# Patient Record
Sex: MALE | Race: WHITE | NOT HISPANIC OR LATINO | Employment: OTHER | URBAN - METROPOLITAN AREA
[De-identification: names, ages, dates, MRNs, and addresses within clinical notes are randomized per-mention and may not be internally consistent; named-entity substitution may affect disease eponyms.]

---

## 2022-03-10 ENCOUNTER — OFFICE VISIT (OUTPATIENT)
Dept: INTERNAL MEDICINE CLINIC | Facility: CLINIC | Age: 72
End: 2022-03-10
Payer: COMMERCIAL

## 2022-03-10 VITALS
DIASTOLIC BLOOD PRESSURE: 87 MMHG | OXYGEN SATURATION: 98 % | SYSTOLIC BLOOD PRESSURE: 170 MMHG | WEIGHT: 176 LBS | BODY MASS INDEX: 25.2 KG/M2 | HEIGHT: 70 IN | HEART RATE: 79 BPM | TEMPERATURE: 98.9 F

## 2022-03-10 DIAGNOSIS — I10 PRIMARY HYPERTENSION: ICD-10-CM

## 2022-03-10 DIAGNOSIS — Z11.59 NEED FOR HEPATITIS C SCREENING TEST: ICD-10-CM

## 2022-03-10 DIAGNOSIS — Z12.5 ENCOUNTER FOR PROSTATE CANCER SCREENING: ICD-10-CM

## 2022-03-10 DIAGNOSIS — Z72.0 TOBACCO ABUSE: ICD-10-CM

## 2022-03-10 DIAGNOSIS — I99.9 VASCULAR DISEASE: ICD-10-CM

## 2022-03-10 DIAGNOSIS — Z76.89 ESTABLISHING CARE WITH NEW DOCTOR, ENCOUNTER FOR: Primary | ICD-10-CM

## 2022-03-10 DIAGNOSIS — K42.9 UMBILICAL HERNIA WITHOUT OBSTRUCTION AND WITHOUT GANGRENE: ICD-10-CM

## 2022-03-10 PROCEDURE — 3008F BODY MASS INDEX DOCD: CPT | Performed by: INTERNAL MEDICINE

## 2022-03-10 PROCEDURE — 1160F RVW MEDS BY RX/DR IN RCRD: CPT | Performed by: INTERNAL MEDICINE

## 2022-03-10 PROCEDURE — 99406 BEHAV CHNG SMOKING 3-10 MIN: CPT | Performed by: INTERNAL MEDICINE

## 2022-03-10 PROCEDURE — 99204 OFFICE O/P NEW MOD 45 MIN: CPT | Performed by: INTERNAL MEDICINE

## 2022-03-10 RX ORDER — AMLODIPINE BESYLATE 10 MG/1
10 TABLET ORAL DAILY
Qty: 90 TABLET | Refills: 0 | Status: SHIPPED | OUTPATIENT
Start: 2022-03-10 | End: 2022-06-30 | Stop reason: SDUPTHER

## 2022-03-10 RX ORDER — AMLODIPINE BESYLATE 10 MG/1
10 TABLET ORAL DAILY
COMMUNITY
Start: 2021-12-29 | End: 2022-03-10 | Stop reason: SDUPTHER

## 2022-03-10 RX ORDER — LISINOPRIL 20 MG/1
20 TABLET ORAL DAILY
Qty: 90 TABLET | Refills: 0 | Status: SHIPPED | OUTPATIENT
Start: 2022-03-10 | End: 2022-06-30

## 2022-03-10 RX ORDER — LISINOPRIL 20 MG/1
20 TABLET ORAL DAILY
COMMUNITY
Start: 2021-12-19 | End: 2022-03-10 | Stop reason: SDUPTHER

## 2022-03-10 NOTE — PATIENT INSTRUCTIONS
Hypertension   WHAT YOU NEED TO KNOW:   Hypertension is high blood pressure  Your blood pressure is the force of your blood moving against the walls of your arteries  Hypertension causes your blood pressure to get so high that your heart has to work much harder than normal  This can damage your heart  The cause of hypertension may not be known  This is called essential or primary hypertension  Hypertension caused by another medical condition, such as kidney disease, is called secondary hypertension  DISCHARGE INSTRUCTIONS:   Call your local emergency number (911 in the [address] [facility]) or have someone call if:   · You have chest pain  · You have any of the following signs of a heart attack:      ? Squeezing, pressure, or pain in your chest    ? You may  also have any of the following:     § Discomfort or pain in your back, neck, jaw, stomach, or arm    § Shortness of breath    § Nausea or vomiting    § Lightheadedness or a sudden cold sweat    · You become confused or have trouble speaking  · You suddenly feel lightheaded or have trouble breathing  Return to the emergency department if:   · You have a severe headache or vision loss  · You have weakness in an arm or leg  Call your doctor or cardiologist if:   · You feel faint, dizzy, confused, or drowsy  · You have been taking your blood pressure medicine but your pressure is higher than your provider says it should be  · You have questions or concerns about your condition or care  Medicines: You may  need any of the following:  · Antihypertensives  may be used to help lower your blood pressure  Several kinds of medicines are available  Your healthcare provider will choose medicines based on the kind of hypertension you have  You may need more than one type of medicine  Take the medicine exactly as directed  · Diuretics  help decrease extra fluid that collects in your body  This will help lower your blood pressure   You may urinate more often while you take this medicine  · Cholesterol medicine  helps lower your cholesterol level  A low cholesterol level helps prevent heart disease and makes it easier to control your blood pressure  · Take your medicine as directed  Contact your healthcare provider if you think your medicine is not helping or if you have side effects  Tell him or her if you are allergic to any medicine  Keep a list of the medicines, vitamins, and herbs you take  Include the amounts, and when and why you take them  Bring the list or the pill bottles to follow-up visits  Carry your medicine list with you in case of an emergency  Follow up with your doctor or cardiologist as directed: You will need to return to have your blood pressure checked and to have other lab tests done  Write down your questions so you remember to ask them during your visits  Stages of hypertension:       · Normal blood pressure is 119/79 or lower   Your healthcare provider may only check your blood pressure each year if it stays at a normal level  · Elevated blood pressure is 120/79 to 129/79   This is sometimes called prehypertension  Your healthcare provider may suggest lifestyle changes to help lower your blood pressure to a normal level  He or she may then check it again in 3 to 6 months  · Stage 1 hypertension is 130/80  to 139/89   Your provider may recommend lifestyle changes, medication, and checks every 3 to 6 months until your blood pressure is controlled  · Stage 2 hypertension is 140/90 or higher   Your provider will recommend lifestyle changes and have you take 2 kinds of hypertension medicines  You will also need to have your blood pressure checked monthly until it is controlled  Manage hypertension:   · Check your blood pressure at home  Avoid smoking, caffeine, and exercise at least 30 minutes before checking your blood pressure  Sit and rest for 5 minutes before you take your blood pressure   Extend your arm and support it on a flat surface  Your arm should be at the same level as your heart  Follow the directions that came with your blood pressure monitor  Check your blood pressure 2 times, 1 minute apart, before you take your medicine in the morning  Also check your blood pressure before your evening meal  Keep a record of your readings and bring it to your follow-up visits  Ask your healthcare provider what your blood pressure should be  · Manage any other health conditions you have  Health conditions such as diabetes can increase your risk for hypertension  Follow your healthcare provider's instructions and take all your medicines as directed  · Ask about all medicines  Certain medicines can increase your blood pressure  Examples include oral birth control pills, decongestants, herbal supplements, and NSAIDs, such as ibuprofen  Your healthcare provider can tell you which medicines are safe for you to take  This includes prescription and over-the-counter medicines  Lifestyle changes you can make to manage hypertension:  Your healthcare provider may recommend you work with a team to manage hypertension  The team may include medical experts such as a dietitian, an exercise or physical therapist, and a behavior therapist  Your family members may be included in helping you create lifestyle changes  · Limit sodium (salt) as directed  Too much sodium can affect your fluid balance  Check labels to find low-sodium or no-salt-added foods  Some low-sodium foods use potassium salts for flavor  Too much potassium can also cause health problems  Your healthcare provider will tell you how much sodium and potassium are safe for you to have in a day  He or she may recommend that you limit sodium to 2,300 mg a day  · Follow the meal plan recommended by your healthcare provider  A dietitian or your provider can give you more information on low-sodium plans or the DASH (Dietary Approaches to Stop Hypertension) eating plan   The DASH plan is low in sodium, processed sugar, unhealthy fats, and total fat  It is high in potassium, calcium, and fiber  These can be found in vegetables, fruit, and whole-grain foods  · Be physically active throughout the day  Physical activity, such as exercise, can help control your blood pressure and your weight  Be physically active for at least 30 minutes per day, on most days of the week  Include aerobic activity, such as walking or riding a bicycle  Also include strength training at least 2 times each week  Your healthcare providers can help you create a physical activity plan  · Decrease stress  This may help lower your blood pressure  Learn ways to relax, such as deep breathing or listening to music  · Limit alcohol as directed  Alcohol can increase your blood pressure  A drink of alcohol is 12 ounces of beer, 5 ounces of wine, or 1½ ounces of liquor  · Do not smoke  Nicotine and other chemicals in cigarettes and cigars can increase your blood pressure and also cause lung damage  Ask your healthcare provider for information if you currently smoke and need help to quit  E-cigarettes or smokeless tobacco still contain nicotine  Talk to your healthcare provider before you use these products  © Copyright [employer] 2022 Information is for End User's use only and may not be sold, redistributed or otherwise used for commercial purposes  All illustrations and images included in CareNotes® are the copyrighted property of [employer]  or [facility] Hermelindo Messer   The above information is an  only  It is not intended as medical advice for individual conditions or treatments  Talk to your doctor, nurse or pharmacist before following any medical regimen to see if it is safe and effective for you  DASH Eating Plan   WHAT YOU NEED TO KNOW:   The DASH (Dietary Approaches to Stop Hypertension) Eating Plan is designed to help prevent or lower high blood pressure   It can also help to lower LDL (bad) cholesterol and decrease your risk for heart disease  The plan is low in sodium, sugar, unhealthy fats, and total fat  It is high in potassium, calcium, magnesium, and fiber  These nutrients are added when you eat more fruits, vegetables, and whole grains  With the DASH eating plan, you need to eat a certain number of servings from each food group  This will help you get enough of certain nutrients and limit others  The amount of servings you should eat depends on how many calories you need  Your dietitian can       DISCHARGE INSTRUCTIONS:   What you need to know about sodium:  Your dietitian will tell you how much sodium is safe for you to have each day  People with high blood pressure should have no more than 1,500 to 2,300 mg of sodium in a day  A teaspoon (tsp) of salt has 2,300 mg of sodium  This may seem like a difficult goal, but small changes to the foods you eat can make a big difference  Your healthcare provider or dietitian can help you create a meal plan that follows your sodium limit  · Read food labels  Food labels can help you choose foods that are low in sodium  The amount of sodium is listed in milligrams (mg)  The % Daily Value (DV) column tells you how much of your daily needs are met by 1 serving of the food for each nutrient listed  Choose foods that have less than 5% of the DV of sodium  These foods are considered low in sodium  Foods that have 20% or more of the DV of sodium are considered high in sodium  Avoid foods that have more than 300 mg of sodium in each serving  Choose foods that say low-sodium, reduced-sodium, or no salt added on the food label  · Limit added salt  Do not salt food at the table if you add salt when you cook  Use herbs and spices, such as onions, garlic, and salt-free seasonings to add flavor  Try lemon or lime juice or vinegar to add a tart flavor  Use hot peppers or a small amount of hot pepper sauce to add a spicy flavor  Limit foods high in added salt, such as the following:    ? Seasonings made with salt, such as garlic salt, celery salt, onion salt, seasoned salt, meat tenderizers, and monosodium glutamate (MSG)    ? Miso soup and canned or dried soup mixes    ? Regular soy sauce, barbecue sauce, teriyaki sauce, steak sauce, Worcestershire sauce, and most flavored vinegars    ? Snack foods, such as salted chips, popcorn, pretzels, pork rinds, salted crackers, and salted nuts    ? Frozen foods, such as dinners, entrees, vegetables with sauces, and breaded meats    · Ask about salt substitutes  Ask your healthcare provider if you may use salt substitutes  Some salt substitutes have ingredients that can be harmful if you have certain health conditions  · Choose foods carefully at restaurants  Meals from restaurants, especially fast food restaurants, are often high in sodium  Some restaurants have nutrition information that tells you the amount of sodium in their foods  Ask to have your food prepared with less, or no salt  What you need to know about fats:  Healthy fats include unsaturated fats and omega-3 fatty acids  Unhealthy fats include saturated fats and trans fats  · Include healthy fats, such as the following:      ? Cooking oils, such as soybean, canola, olive, or sunflower    ? Fatty fish, such as salmon, tuna, mackerel, or sardines    ? Flaxseed oil or ground flaxseed    ? ½ cup of cooked beans, such as black beans, kidney beans, or jackson beans    ? 1½ ounces of low-sodium nuts, such as almonds or walnuts    ? Low-sugar, low-sodium peanut butter    ? Seeds such as mariam seeds or sunflower seeds       · Limit or do not have unhealthy fats, such as the following:      ? Foods that contain fat from animals, such as fatty meats, whole milk, butter, and cream    ? Shortening, stick margarine, palm oil, and coconut oil    ? Full-fat or creamy salad dressing    ? Creamy soup    ?  Crackers, chips, and baked goods made with margarine or shortening    ? Foods that are fried in unhealthy fats    ? Gravy and sauces, such as Alex or cheese sauces    What you need to know about carbohydrates (carbs): All carbs break down into sugar  Complex carbs contain more fiber than simple carbs  This means complex carbs go into the bloodstream more slowly and cause less of a blood sugar spike  Try to include more complex carbs and fewer simple carbs  · Include complex carbs, such as the following:      ? 1 slice of whole-grain bread    ? 1 ounce of dry cereal that does not contain added sugar    ? ½ cup of cooked oatmeal    ? 2 ounces of cooked whole-grain pasta    ? ½ cup of cooked brown rice    · Limit or do not have simple carbs, such as the following:      ? Baked goods, such as doughnuts, pastries, and cookies    ? Mixes for cornbread and biscuits    ? White rice and pasta mixes, such as boxed macaroni and cheese    ? Instant and cold cereals that contain sugar    ? Jelly, jam, and ice cream that contain sugar    ? Condiments such as ketchup    ? Drinks high in sugar, such as soft drinks, lemonade, and fruit juice    What you need to know about vegetables and fruits:  Vegetables and fruits can be fresh, frozen, or canned  If possible, try to choose low-sodium canned options  · Include a variety of vegetables and fruits, such as the following:      ? 1 medium apple, pear, or peach (about ½ cup chopped)    ? ½ small banana    ? ½ cup berries, such as blueberries, strawberries, or blackberries    ? 1 cup of raw leafy greens, such as lettuce, spinach, kale, or art greens    ? ½ cup of frozen or canned (no added salt) vegetables, such as green beans    ? ½ cup of fresh, frozen, or canned fruit (canned in light syrup or fruit juice)    ? ½ cup of vegetable or fruit juice    · Limit or do not have vegetables and fruits made in the following ways:      ? Frozen fruit such as cherries that have added sugar    ?  Fruit in cream or butter sauce    ? Canned vegetables that are high in sodium    ? Sauerkraut, pickled vegetables, and other foods prepared in brine    ? Fried vegetables or vegetables in butter or high-fat sauces    What you need to know about protein foods:   · Include lean or low-fat protein foods, such as the following:      ? Poultry (chicken, turkey) with no skin    ? Fish (especially fatty fish, such as salmon, fresh tuna, or mackerel)    ? Lean beef and pork (loin, round, extra lean hamburger)    ? Egg whites and egg substitutes    ? 1 cup of nonfat (skim) or 1% milk    ? 1½ ounces of fat-free or low-fat cheese    ? 6 ounces of nonfat or low-fat yogurt    · Limit or do not have high-fat protein foods, such as the following:      ? Smoked or cured meat, such as corned beef, lazaro, ham, hot dogs, and sausage    ? Canned beans and canned meats or spreads, such as potted meats, sardines, anchovies, and imitation seafood    ? Deli or lunch meats, such as bologna, ham, turkey, and roast beef    ? High-fat meat (T-bone steak, regular hamburger, and ribs)    ? Whole eggs and egg yolks    ? Whole milk, 2% milk, and cream    ? Regular cheese and processed cheese    Other guidelines to follow:   · Maintain a healthy weight  Your risk for heart disease is higher if you are overweight  Your healthcare provider may suggest that you lose weight if you are overweight  You can lose weight by eating fewer calories and foods that have added sugars and fat  The DASH meal plan can help you do this  Decrease calories by eating smaller portions at each meal and fewer snacks  Ask your healthcare provider for more information about how to lose weight  · Exercise regularly  Regular exercise can help you reach or maintain a healthy weight  Regular exercise can also help decrease your blood pressure and improve your cholesterol levels  Get 30 minutes or more of moderate exercise each day of the week  To lose weight, get at least 60 minutes of exercise  Talk to your healthcare provider about the best exercise program for you  · Limit alcohol  Women should limit alcohol to 1 drink a day  Men should limit alcohol to 2 drinks a day  A drink of alcohol is 12 ounces of beer, 5 ounces of wine, or 1½ ounces of liquor  For more information:   · [facility] [address]  [address] [address]  [place] [address]  Phone: [phone]  Web Address: [facility] no    © [address] [facility] 2022 Information is for End User's use only and may not be sold, redistributed or otherwise used for commercial purposes  All illustrations and images included in CareNotes® are the copyrighted property of [employer]  or [facility] Hermelindo Messer   The above information is an  only  It is not intended as medical advice for individual conditions or treatments  Talk to your doctor, nurse or pharmacist before following any medical regimen to see if it is safe and effective for you

## 2022-03-10 NOTE — PROGRESS NOTES
Assessment/Plan:    Diagnoses and all orders for this visit:    Establishing care with new doctor, encounter for  Comments:  Moved from [facility] in 2019 and has not been able to establish care due to pandemic,reports that he gets refills from his physician son in [place]  Primary hypertension  -     CBC and differential; Future  -     Comprehensive metabolic panel; Future  -     Lipid panel; Future  -     amLODIPine (NORVASC) 10 mg tablet; Take 1 tablet (10 mg total) by mouth daily  -     lisinopril (ZESTRIL) 20 mg tablet; Take 1 tablet (20 mg total) by mouth daily    Need for hepatitis C screening test  -     Hepatitis C Antibody ([facility]); Future    Encounter for prostate cancer screening  -     PSA, Total Screen; Future    Tobacco abuse  Comments:  Current smoker, for 50 years, counseled for smoking cessation     Umbilical hernia without obstruction and without gangrene  Comments:  Ongoing for several years, denies any discomfort, declines surgical referral, advised to seek immediate medical opinion if he has abdominal pain with NVD    Vascular disease  Comments:  H/o R femoro popliteal bypass in 2017, was on plavix and aspirin which he discontinued due to frequent nose bleeds  Will recommend vascular surgery referral    Other orders  -     Discontinue: amLODIPine (NORVASC) 10 mg tablet; Take 10 mg by mouth daily  -     Discontinue: lisinopril (ZESTRIL) 20 mg tablet; Take 20 mg by mouth daily         BMI Counseling: Body mass index is 25 25 kg/m²  The BMI is above normal  Nutrition recommendations include decreasing portion sizes, encouraging healthy choices of fruits and vegetables, decreasing fast food intake, consuming healthier snacks, limiting drinks that contain sugar, moderation in carbohydrate intake, increasing intake of lean protein, reducing intake of saturated and trans fat and reducing intake of cholesterol  Exercise recommendations include moderate physical activity 150 minutes/week   No pharmacotherapy was ordered  Rationale for BMI follow-up plan is due to patient being overweight or obese  Patient Instructions     Hypertension   WHAT YOU NEED TO KNOW:   Hypertension is high blood pressure  Your blood pressure is the force of your blood moving against the walls of your arteries  Hypertension causes your blood pressure to get so high that your heart has to work much harder than normal  This can damage your heart  The cause of hypertension may not be known  This is called essential or primary hypertension  Hypertension caused by another medical condition, such as kidney disease, is called secondary hypertension  DISCHARGE INSTRUCTIONS:   Call your local emergency number (911 in the [address] [facility]) or have someone call if:   · You have chest pain  · You have any of the following signs of a heart attack:      ? Squeezing, pressure, or pain in your chest    ? You may  also have any of the following:     § Discomfort or pain in your back, neck, jaw, stomach, or arm    § Shortness of breath    § Nausea or vomiting    § Lightheadedness or a sudden cold sweat    · You become confused or have trouble speaking  · You suddenly feel lightheaded or have trouble breathing  Return to the emergency department if:   · You have a severe headache or vision loss  · You have weakness in an arm or leg  Call your doctor or cardiologist if:   · You feel faint, dizzy, confused, or drowsy  · You have been taking your blood pressure medicine but your pressure is higher than your provider says it should be  · You have questions or concerns about your condition or care  Medicines: You may  need any of the following:  · Antihypertensives  may be used to help lower your blood pressure  Several kinds of medicines are available  Your healthcare provider will choose medicines based on the kind of hypertension you have  You may need more than one type of medicine   Take the medicine exactly as directed  · Diuretics  help decrease extra fluid that collects in your body  This will help lower your blood pressure  You may urinate more often while you take this medicine  · Cholesterol medicine  helps lower your cholesterol level  A low cholesterol level helps prevent heart disease and makes it easier to control your blood pressure  · Take your medicine as directed  Contact your healthcare provider if you think your medicine is not helping or if you have side effects  Tell him or her if you are allergic to any medicine  Keep a list of the medicines, vitamins, and herbs you take  Include the amounts, and when and why you take them  Bring the list or the pill bottles to follow-up visits  Carry your medicine list with you in case of an emergency  Follow up with your doctor or cardiologist as directed: You will need to return to have your blood pressure checked and to have other lab tests done  Write down your questions so you remember to ask them during your visits  Stages of hypertension:       · Normal blood pressure is 119/79 or lower   Your healthcare provider may only check your blood pressure each year if it stays at a normal level  · Elevated blood pressure is 120/79 to 129/79   This is sometimes called prehypertension  Your healthcare provider may suggest lifestyle changes to help lower your blood pressure to a normal level  He or she may then check it again in 3 to 6 months  · Stage 1 hypertension is 130/80  to 139/89   Your provider may recommend lifestyle changes, medication, and checks every 3 to 6 months until your blood pressure is controlled  · Stage 2 hypertension is 140/90 or higher   Your provider will recommend lifestyle changes and have you take 2 kinds of hypertension medicines  You will also need to have your blood pressure checked monthly until it is controlled  Manage hypertension:   · Check your blood pressure at home    Avoid smoking, caffeine, and exercise at least 30 minutes before checking your blood pressure  Sit and rest for 5 minutes before you take your blood pressure  Extend your arm and support it on a flat surface  Your arm should be at the same level as your heart  Follow the directions that came with your blood pressure monitor  Check your blood pressure 2 times, 1 minute apart, before you take your medicine in the morning  Also check your blood pressure before your evening meal  Keep a record of your readings and bring it to your follow-up visits  Ask your healthcare provider what your blood pressure should be  · Manage any other health conditions you have  Health conditions such as diabetes can increase your risk for hypertension  Follow your healthcare provider's instructions and take all your medicines as directed  · Ask about all medicines  Certain medicines can increase your blood pressure  Examples include oral birth control pills, decongestants, herbal supplements, and NSAIDs, such as ibuprofen  Your healthcare provider can tell you which medicines are safe for you to take  This includes prescription and over-the-counter medicines  Lifestyle changes you can make to manage hypertension:  Your healthcare provider may recommend you work with a team to manage hypertension  The team may include medical experts such as a dietitian, an exercise or physical therapist, and a behavior therapist  Your family members may be included in helping you create lifestyle changes  · Limit sodium (salt) as directed  Too much sodium can affect your fluid balance  Check labels to find low-sodium or no-salt-added foods  Some low-sodium foods use potassium salts for flavor  Too much potassium can also cause health problems  Your healthcare provider will tell you how much sodium and potassium are safe for you to have in a day  He or she may recommend that you limit sodium to 2,300 mg a day           · Follow the meal plan recommended by your healthcare provider  A dietitian or your provider can give you more information on low-sodium plans or the DASH (Dietary Approaches to Stop Hypertension) eating plan  The DASH plan is low in sodium, processed sugar, unhealthy fats, and total fat  It is high in potassium, calcium, and fiber  These can be found in vegetables, fruit, and whole-grain foods  · Be physically active throughout the day  Physical activity, such as exercise, can help control your blood pressure and your weight  Be physically active for at least 30 minutes per day, on most days of the week  Include aerobic activity, such as walking or riding a bicycle  Also include strength training at least 2 times each week  Your healthcare providers can help you create a physical activity plan  · Decrease stress  This may help lower your blood pressure  Learn ways to relax, such as deep breathing or listening to music  · Limit alcohol as directed  Alcohol can increase your blood pressure  A drink of alcohol is 12 ounces of beer, 5 ounces of wine, or 1½ ounces of liquor  · Do not smoke  Nicotine and other chemicals in cigarettes and cigars can increase your blood pressure and also cause lung damage  Ask your healthcare provider for information if you currently smoke and need help to quit  E-cigarettes or smokeless tobacco still contain nicotine  Talk to your healthcare provider before you use these products  © Copyright [employer] 2022 Information is for End User's use only and may not be sold, redistributed or otherwise used for commercial purposes  All illustrations and images included in CareNotes® are the copyrighted property of [employer] M [employer] Inc  or [facility] [employer]   The above information is an  only  It is not intended as medical advice for individual conditions or treatments  Talk to your doctor, nurse or pharmacist before following any medical regimen to see if it is safe and effective for you    DASH Eating Plan   WHAT YOU NEED TO KNOW:   The DASH (Dietary Approaches to Stop Hypertension) Eating Plan is designed to help prevent or lower high blood pressure  It can also help to lower LDL (bad) cholesterol and decrease your risk for heart disease  The plan is low in sodium, sugar, unhealthy fats, and total fat  It is high in potassium, calcium, magnesium, and fiber  These nutrients are added when you eat more fruits, vegetables, and whole grains  With the DASH eating plan, you need to eat a certain number of servings from each food group  This will help you get enough of certain nutrients and limit others  The amount of servings you should eat depends on how many calories you need  Your dietitian can       DISCHARGE INSTRUCTIONS:   What you need to know about sodium:  Your dietitian will tell you how much sodium is safe for you to have each day  People with high blood pressure should have no more than 1,500 to 2,300 mg of sodium in a day  A teaspoon (tsp) of salt has 2,300 mg of sodium  This may seem like a difficult goal, but small changes to the foods you eat can make a big difference  Your healthcare provider or dietitian can help you create a meal plan that follows your sodium limit  · Read food labels  Food labels can help you choose foods that are low in sodium  The amount of sodium is listed in milligrams (mg)  The % Daily Value (DV) column tells you how much of your daily needs are met by 1 serving of the food for each nutrient listed  Choose foods that have less than 5% of the DV of sodium  These foods are considered low in sodium  Foods that have 20% or more of the DV of sodium are considered high in sodium  Avoid foods that have more than 300 mg of sodium in each serving  Choose foods that say low-sodium, reduced-sodium, or no salt added on the food label  · Limit added salt  Do not salt food at the table if you add salt when you cook   Use herbs and spices, such as onions, garlic, and salt-free seasonings to add flavor  Try lemon or lime juice or vinegar to add a tart flavor  Use hot peppers or a small amount of hot pepper sauce to add a spicy flavor  Limit foods high in added salt, such as the following:    ? Seasonings made with salt, such as garlic salt, celery salt, onion salt, seasoned salt, meat tenderizers, and monosodium glutamate (MSG)    ? Miso soup and canned or dried soup mixes    ? Regular soy sauce, barbecue sauce, teriyaki sauce, steak sauce, Worcestershire sauce, and most flavored vinegars    ? Snack foods, such as salted chips, popcorn, pretzels, pork rinds, salted crackers, and salted nuts    ? Frozen foods, such as dinners, entrees, vegetables with sauces, and breaded meats    · Ask about salt substitutes  Ask your healthcare provider if you may use salt substitutes  Some salt substitutes have ingredients that can be harmful if you have certain health conditions  · Choose foods carefully at restaurants  Meals from restaurants, especially fast food restaurants, are often high in sodium  Some restaurants have nutrition information that tells you the amount of sodium in their foods  Ask to have your food prepared with less, or no salt  What you need to know about fats:  Healthy fats include unsaturated fats and omega-3 fatty acids  Unhealthy fats include saturated fats and trans fats  · Include healthy fats, such as the following:      ? Cooking oils, such as soybean, canola, olive, or sunflower    ? Fatty fish, such as salmon, tuna, mackerel, or sardines    ? Flaxseed oil or ground flaxseed    ? ½ cup of cooked beans, such as black beans, kidney beans, or jackson beans    ? 1½ ounces of low-sodium nuts, such as almonds or walnuts    ? Low-sugar, low-sodium peanut butter    ? Seeds such as mariam seeds or sunflower seeds       · Limit or do not have unhealthy fats, such as the following:      ?  Foods that contain fat from animals, such as fatty meats, whole milk, butter, and cream    ? Shortening, stick margarine, palm oil, and coconut oil    ? Full-fat or creamy salad dressing    ? Creamy soup    ? Crackers, chips, and baked goods made with margarine or shortening    ? Foods that are fried in unhealthy fats    ? Gravy and sauces, such as Alex or cheese sauces    What you need to know about carbohydrates (carbs): All carbs break down into sugar  Complex carbs contain more fiber than simple carbs  This means complex carbs go into the bloodstream more slowly and cause less of a blood sugar spike  Try to include more complex carbs and fewer simple carbs  · Include complex carbs, such as the following:      ? 1 slice of whole-grain bread    ? 1 ounce of dry cereal that does not contain added sugar    ? ½ cup of cooked oatmeal    ? 2 ounces of cooked whole-grain pasta    ? ½ cup of cooked brown rice    · Limit or do not have simple carbs, such as the following:      ? Baked goods, such as doughnuts, pastries, and cookies    ? Mixes for cornbread and biscuits    ? White rice and pasta mixes, such as boxed macaroni and cheese    ? Instant and cold cereals that contain sugar    ? Jelly, jam, and ice cream that contain sugar    ? Condiments such as ketchup    ? Drinks high in sugar, such as soft drinks, lemonade, and fruit juice    What you need to know about vegetables and fruits:  Vegetables and fruits can be fresh, frozen, or canned  If possible, try to choose low-sodium canned options  · Include a variety of vegetables and fruits, such as the following:      ? 1 medium apple, pear, or peach (about ½ cup chopped)    ? ½ small banana    ? ½ cup berries, such as blueberries, strawberries, or blackberries    ? 1 cup of raw leafy greens, such as lettuce, spinach, kale, or art greens    ? ½ cup of frozen or canned (no added salt) vegetables, such as green beans    ? ½ cup of fresh, frozen, or canned fruit (canned in light syrup or fruit juice)    ?  ½ cup of vegetable or fruit juice    · Limit or do not have vegetables and fruits made in the following ways:      ? Frozen fruit such as cherries that have added sugar    ? Fruit in cream or butter sauce    ? Canned vegetables that are high in sodium    ? Sauerkraut, pickled vegetables, and other foods prepared in brine    ? Fried vegetables or vegetables in butter or high-fat sauces    What you need to know about protein foods:   · Include lean or low-fat protein foods, such as the following:      ? Poultry (chicken, turkey) with no skin    ? Fish (especially fatty fish, such as salmon, fresh tuna, or mackerel)    ? Lean beef and pork (loin, round, extra lean hamburger)    ? Egg whites and egg substitutes    ? 1 cup of nonfat (skim) or 1% milk    ? 1½ ounces of fat-free or low-fat cheese    ? 6 ounces of nonfat or low-fat yogurt    · Limit or do not have high-fat protein foods, such as the following:      ? Smoked or cured meat, such as corned beef, lazaro, ham, hot dogs, and sausage    ? Canned beans and canned meats or spreads, such as potted meats, sardines, anchovies, and imitation seafood    ? Deli or lunch meats, such as bologna, ham, turkey, and roast beef    ? High-fat meat (T-bone steak, regular hamburger, and ribs)    ? Whole eggs and egg yolks    ? Whole milk, 2% milk, and cream    ? Regular cheese and processed cheese    Other guidelines to follow:   · Maintain a healthy weight  Your risk for heart disease is higher if you are overweight  Your healthcare provider may suggest that you lose weight if you are overweight  You can lose weight by eating fewer calories and foods that have added sugars and fat  The DASH meal plan can help you do this  Decrease calories by eating smaller portions at each meal and fewer snacks  Ask your healthcare provider for more information about how to lose weight  · Exercise regularly  Regular exercise can help you reach or maintain a healthy weight   Regular exercise can also help decrease your blood pressure and improve your cholesterol levels  Get 30 minutes or more of moderate exercise each day of the week  To lose weight, get at least 60 minutes of exercise  Talk to your healthcare provider about the best exercise program for you  · Limit alcohol  Women should limit alcohol to 1 drink a day  Men should limit alcohol to 2 drinks a day  A drink of alcohol is 12 ounces of beer, 5 ounces of wine, or 1½ ounces of liquor  For more information:   · [facility] and [address]  [address] [address]  [place] [address]  Phone: 0- 154 - [phone]  Web Address: [facility] no    © [facility] 2022 Information is for End User's use only and may not be sold, redistributed or otherwise used for commercial purposes  All illustrations and images included in CareNotes® are the copyrighted property of [employer] A M [employer] Inc  or [employer]   The above information is an  only  It is not intended as medical advice for individual conditions or treatments  Talk to your doctor, nurse or pharmacist before following any medical regimen to see if it is safe and effective for you  Subjective:      Patient ID: Elizabeth De La Cruz is a 66 y o  male    Pt comes to establish care after moving from [place] in 2019 and was not able to establish care due to pandemic  Has has h/o hypertension and PVD and had several ER visits last year at [facility] for hypertensive urgency with right sided paraesthesia, blood work and [facility] and CTA neck, renal US  being unremarkable  Started on lisinopril 20 mg in addition to his amlodipine 10 mg  Denies CAD, DM  Denies CP, SOB, palpitations, currently           Current Outpatient Medications:     amLODIPine (NORVASC) 10 mg tablet, Take 1 tablet (10 mg total) by mouth daily, Disp: 90 tablet, Rfl: 0    lisinopril (ZESTRIL) 20 mg tablet, Take 1 tablet (20 mg total) by mouth daily, Disp: 90 tablet, Rfl: 0    No results found for this or any previous visit (from the past 1008 hour(s))  The following portions of the patient's history were reviewed and updated as appropriate: allergies, current medications, past family history, past medical history, past social history, past surgical history and problem list      Review of Systems   Constitutional: Negative for activity change, appetite change, chills, diaphoresis, fatigue, fever and unexpected weight change  HENT: Negative for congestion and sore throat  Respiratory: Negative for apnea, cough, choking, chest tightness, shortness of breath, wheezing and stridor  Cardiovascular: Negative for chest pain, palpitations and leg swelling  Gastrointestinal: Negative for abdominal distention, abdominal pain, blood in stool, constipation, nausea and rectal pain  Genitourinary: Negative for dysuria, flank pain, frequency and urgency  Musculoskeletal: Negative for arthralgias, back pain, gait problem, joint swelling and myalgias  Skin: Negative for color change, pallor and rash  Neurological: Negative for dizziness, tremors, syncope, weakness, light-headedness, numbness and headaches  Objective:      Vitals:    03/10/22 1622   BP: 170/87   Pulse: 79   Temp: 98 9 °F (37 2 °C)   SpO2: 98%          Physical Exam  Vitals reviewed  Constitutional:       General: He is not in acute distress  Appearance: Normal appearance  He is not ill-appearing, toxic-appearing or diaphoretic  HENT:      Mouth/Throat:      Mouth: Mucous membranes are moist    Cardiovascular:      Rate and Rhythm: Normal rate and regular rhythm  Pulses: Normal pulses  Heart sounds: Normal heart sounds  No murmur heard  No friction rub  No gallop  Pulmonary:      Effort: Pulmonary effort is normal  No respiratory distress  Breath sounds: Normal breath sounds  No stridor  No wheezing, rhonchi or rales  Chest:      Chest wall: No tenderness     Abdominal: General: There is no distension  Palpations: Abdomen is soft  There is no mass  Tenderness: There is no abdominal tenderness  There is no rebound  Musculoskeletal:         General: No swelling or tenderness  Right lower leg: Edema present  Left lower leg: No edema  Skin:     General: Skin is warm and dry  Findings: No lesion or rash  Neurological:      Mental Status: He is alert and oriented to person, place, and time

## 2022-03-11 PROBLEM — I16.9 HYPERTENSIVE CRISIS, UNSPECIFIED: Status: ACTIVE | Noted: 2021-03-24

## 2022-03-11 PROBLEM — I99.9 VASCULAR DISEASE: Status: ACTIVE | Noted: 2021-03-24

## 2022-03-11 PROBLEM — Z72.0 TOBACCO ABUSE: Status: ACTIVE | Noted: 2022-03-11

## 2022-03-11 PROBLEM — I10 PRIMARY HYPERTENSION: Status: ACTIVE | Noted: 2022-03-11

## 2022-03-11 PROBLEM — K42.9 UMBILICAL HERNIA WITHOUT OBSTRUCTION AND WITHOUT GANGRENE: Status: ACTIVE | Noted: 2022-03-11

## 2022-03-14 ENCOUNTER — LAB (OUTPATIENT)
Dept: LAB | Age: 72
End: 2022-03-14
Payer: COMMERCIAL

## 2022-03-14 DIAGNOSIS — Z11.59 NEED FOR HEPATITIS C SCREENING TEST: ICD-10-CM

## 2022-03-14 DIAGNOSIS — I10 PRIMARY HYPERTENSION: ICD-10-CM

## 2022-03-14 DIAGNOSIS — Z12.5 ENCOUNTER FOR PROSTATE CANCER SCREENING: ICD-10-CM

## 2022-03-14 LAB
ALBUMIN SERPL BCP-MCNC: 3.8 G/DL (ref 3.5–5)
ALP SERPL-CCNC: 101 U/L (ref 46–116)
ALT SERPL W P-5'-P-CCNC: 23 U/L (ref 12–78)
ANION GAP SERPL CALCULATED.3IONS-SCNC: 7 MMOL/L (ref 4–13)
AST SERPL W P-5'-P-CCNC: 20 U/L (ref 5–45)
BASOPHILS # BLD AUTO: 0.09 THOUSANDS/ΜL (ref 0–0.1)
BASOPHILS NFR BLD AUTO: 1 % (ref 0–1)
BILIRUB SERPL-MCNC: 0.42 MG/DL (ref 0.2–1)
BUN SERPL-MCNC: 22 MG/DL (ref 5–25)
CALCIUM SERPL-MCNC: 9.4 MG/DL (ref 8.3–10.1)
CHLORIDE SERPL-SCNC: 109 MMOL/L (ref 100–108)
CHOLEST SERPL-MCNC: 184 MG/DL
CO2 SERPL-SCNC: 28 MMOL/L (ref 21–32)
CREAT SERPL-MCNC: 0.98 MG/DL (ref 0.6–1.3)
EOSINOPHIL # BLD AUTO: 0.36 THOUSAND/ΜL (ref 0–0.61)
EOSINOPHIL NFR BLD AUTO: 4 % (ref 0–6)
ERYTHROCYTE [DISTWIDTH] IN BLOOD BY AUTOMATED COUNT: 15 % (ref 11.6–15.1)
GFR SERPL CREATININE-BSD FRML MDRD: 76 ML/MIN/1.73SQ M
GLUCOSE P FAST SERPL-MCNC: 101 MG/DL (ref 65–99)
HCT VFR BLD AUTO: 47.7 % (ref 36.5–49.3)
HCV AB SER QL: NORMAL
HDLC SERPL-MCNC: 41 MG/DL
HGB BLD-MCNC: 15.5 G/DL (ref 12–17)
IMM GRANULOCYTES # BLD AUTO: 0.03 THOUSAND/UL (ref 0–0.2)
IMM GRANULOCYTES NFR BLD AUTO: 0 % (ref 0–2)
LDLC SERPL CALC-MCNC: 117 MG/DL (ref 0–100)
LYMPHOCYTES # BLD AUTO: 2.71 THOUSANDS/ΜL (ref 0.6–4.47)
LYMPHOCYTES NFR BLD AUTO: 28 % (ref 14–44)
MCH RBC QN AUTO: 29.1 PG (ref 26.8–34.3)
MCHC RBC AUTO-ENTMCNC: 32.5 G/DL (ref 31.4–37.4)
MCV RBC AUTO: 90 FL (ref 82–98)
MONOCYTES # BLD AUTO: 1.14 THOUSAND/ΜL (ref 0.17–1.22)
MONOCYTES NFR BLD AUTO: 12 % (ref 4–12)
NEUTROPHILS # BLD AUTO: 5.3 THOUSANDS/ΜL (ref 1.85–7.62)
NEUTS SEG NFR BLD AUTO: 55 % (ref 43–75)
NONHDLC SERPL-MCNC: 143 MG/DL
NRBC BLD AUTO-RTO: 0 /100 WBCS
PLATELET # BLD AUTO: 234 THOUSANDS/UL (ref 149–390)
PMV BLD AUTO: 11.2 FL (ref 8.9–12.7)
POTASSIUM SERPL-SCNC: 3.9 MMOL/L (ref 3.5–5.3)
PROT SERPL-MCNC: 7.5 G/DL (ref 6.4–8.2)
PSA SERPL-MCNC: 3.1 NG/ML (ref 0–4)
RBC # BLD AUTO: 5.33 MILLION/UL (ref 3.88–5.62)
SODIUM SERPL-SCNC: 144 MMOL/L (ref 136–145)
TRIGL SERPL-MCNC: 132 MG/DL
WBC # BLD AUTO: 9.63 THOUSAND/UL (ref 4.31–10.16)

## 2022-03-14 PROCEDURE — 36415 COLL VENOUS BLD VENIPUNCTURE: CPT

## 2022-03-14 PROCEDURE — G0103 PSA SCREENING: HCPCS

## 2022-03-14 PROCEDURE — 80053 COMPREHEN METABOLIC PANEL: CPT

## 2022-03-14 PROCEDURE — 86803 HEPATITIS C AB TEST: CPT

## 2022-03-14 PROCEDURE — 80061 LIPID PANEL: CPT

## 2022-03-14 PROCEDURE — 85025 COMPLETE CBC W/AUTO DIFF WBC: CPT

## 2022-03-15 NOTE — RESULT ENCOUNTER NOTE
His cholesterol was elevated but otherwise blood work was good, he has a history of peripheral vascular disease and needs to be on a cholesterol-lowering medications  Will discuss with him in the next office visit about this in a month's time

## 2022-03-16 ENCOUNTER — TELEPHONE (OUTPATIENT)
Dept: INTERNAL MEDICINE CLINIC | Facility: CLINIC | Age: 72
End: 2022-03-16

## 2022-04-02 ENCOUNTER — HOSPITAL ENCOUNTER (EMERGENCY)
Facility: HOSPITAL | Age: 72
Discharge: HOME | End: 2022-04-03
Attending: EMERGENCY MEDICINE
Payer: COMMERCIAL

## 2022-04-02 ENCOUNTER — APPOINTMENT (EMERGENCY)
Dept: CT IMAGING | Facility: HOSPITAL | Age: 72
End: 2022-04-02
Payer: COMMERCIAL

## 2022-04-02 VITALS
HEIGHT: 70 IN | HEART RATE: 78 BPM | SYSTOLIC BLOOD PRESSURE: 188 MMHG | BODY MASS INDEX: 24.77 KG/M2 | DIASTOLIC BLOOD PRESSURE: 91 MMHG | RESPIRATION RATE: 18 BRPM | OXYGEN SATURATION: 96 % | WEIGHT: 173 LBS | TEMPERATURE: 97.8 F

## 2022-04-02 DIAGNOSIS — I16.0 HYPERTENSIVE URGENCY: Primary | ICD-10-CM

## 2022-04-02 LAB
2HR DELTA HS TROPONIN: -2 NG/L
ALBUMIN SERPL BCP-MCNC: 3.9 G/DL (ref 3.5–5)
ALP SERPL-CCNC: 108 U/L (ref 46–116)
ALT SERPL W P-5'-P-CCNC: 26 U/L (ref 12–78)
ANION GAP SERPL CALCULATED.3IONS-SCNC: 8 MMOL/L (ref 4–13)
AST SERPL W P-5'-P-CCNC: 17 U/L (ref 5–45)
BASOPHILS # BLD AUTO: 0.06 THOUSANDS/ΜL (ref 0–0.1)
BASOPHILS NFR BLD AUTO: 1 % (ref 0–1)
BILIRUB SERPL-MCNC: 0.25 MG/DL (ref 0.2–1)
BUN SERPL-MCNC: 21 MG/DL (ref 5–25)
CALCIUM SERPL-MCNC: 9 MG/DL (ref 8.3–10.1)
CARDIAC TROPONIN I PNL SERPL HS: 19 NG/L
CARDIAC TROPONIN I PNL SERPL HS: 21 NG/L
CHLORIDE SERPL-SCNC: 102 MMOL/L (ref 100–108)
CO2 SERPL-SCNC: 30 MMOL/L (ref 21–32)
CREAT SERPL-MCNC: 1.4 MG/DL (ref 0.6–1.3)
EOSINOPHIL # BLD AUTO: 0.29 THOUSAND/ΜL (ref 0–0.61)
EOSINOPHIL NFR BLD AUTO: 3 % (ref 0–6)
ERYTHROCYTE [DISTWIDTH] IN BLOOD BY AUTOMATED COUNT: 14.8 % (ref 11.6–15.1)
GFR SERPL CREATININE-BSD FRML MDRD: 49 ML/MIN/1.73SQ M
GLUCOSE SERPL-MCNC: 125 MG/DL (ref 65–140)
HCT VFR BLD AUTO: 51.1 % (ref 36.5–49.3)
HGB BLD-MCNC: 16.8 G/DL (ref 12–17)
IMM GRANULOCYTES # BLD AUTO: 0.04 THOUSAND/UL (ref 0–0.2)
IMM GRANULOCYTES NFR BLD AUTO: 0 % (ref 0–2)
LYMPHOCYTES # BLD AUTO: 2.21 THOUSANDS/ΜL (ref 0.6–4.47)
LYMPHOCYTES NFR BLD AUTO: 23 % (ref 14–44)
MCH RBC QN AUTO: 29.7 PG (ref 26.8–34.3)
MCHC RBC AUTO-ENTMCNC: 32.9 G/DL (ref 31.4–37.4)
MCV RBC AUTO: 90 FL (ref 82–98)
MONOCYTES # BLD AUTO: 0.93 THOUSAND/ΜL (ref 0.17–1.22)
MONOCYTES NFR BLD AUTO: 10 % (ref 4–12)
NEUTROPHILS # BLD AUTO: 6.27 THOUSANDS/ΜL (ref 1.85–7.62)
NEUTS SEG NFR BLD AUTO: 63 % (ref 43–75)
NRBC BLD AUTO-RTO: 0 /100 WBCS
PLATELET # BLD AUTO: 248 THOUSANDS/UL (ref 149–390)
PMV BLD AUTO: 10.7 FL (ref 8.9–12.7)
POTASSIUM SERPL-SCNC: 4.5 MMOL/L (ref 3.5–5.3)
PROT SERPL-MCNC: 8.2 G/DL (ref 6.4–8.2)
RBC # BLD AUTO: 5.65 MILLION/UL (ref 3.88–5.62)
SODIUM SERPL-SCNC: 140 MMOL/L (ref 136–145)
WBC # BLD AUTO: 9.8 THOUSAND/UL (ref 4.31–10.16)

## 2022-04-02 PROCEDURE — 36415 COLL VENOUS BLD VENIPUNCTURE: CPT | Performed by: EMERGENCY MEDICINE

## 2022-04-02 PROCEDURE — 84484 ASSAY OF TROPONIN QUANT: CPT | Performed by: EMERGENCY MEDICINE

## 2022-04-02 PROCEDURE — 99284 EMERGENCY DEPT VISIT MOD MDM: CPT

## 2022-04-02 PROCEDURE — 70450 CT HEAD/BRAIN W/O DYE: CPT

## 2022-04-02 PROCEDURE — 85025 COMPLETE CBC W/AUTO DIFF WBC: CPT | Performed by: EMERGENCY MEDICINE

## 2022-04-02 PROCEDURE — 93005 ELECTROCARDIOGRAM TRACING: CPT

## 2022-04-02 PROCEDURE — G1004 CDSM NDSC: HCPCS

## 2022-04-02 PROCEDURE — 96374 THER/PROPH/DIAG INJ IV PUSH: CPT

## 2022-04-02 PROCEDURE — 80053 COMPREHEN METABOLIC PANEL: CPT | Performed by: EMERGENCY MEDICINE

## 2022-04-02 PROCEDURE — 99284 EMERGENCY DEPT VISIT MOD MDM: CPT | Performed by: EMERGENCY MEDICINE

## 2022-04-02 RX ORDER — LABETALOL 20 MG/4 ML (5 MG/ML) INTRAVENOUS SYRINGE
10 ONCE
Status: COMPLETED | OUTPATIENT
Start: 2022-04-02 | End: 2022-04-02

## 2022-04-02 RX ADMIN — LABETALOL HYDROCHLORIDE 10 MG: 5 INJECTION, SOLUTION INTRAVENOUS at 21:10

## 2022-04-03 LAB
ATRIAL RATE: 75 BPM
ATRIAL RATE: 75 BPM
P AXIS: 68 DEGREES
P AXIS: 76 DEGREES
PR INTERVAL: 204 MS
PR INTERVAL: 208 MS
QRS AXIS: 52 DEGREES
QRS AXIS: 58 DEGREES
QRSD INTERVAL: 64 MS
QRSD INTERVAL: 76 MS
QT INTERVAL: 360 MS
QT INTERVAL: 362 MS
QTC INTERVAL: 402 MS
QTC INTERVAL: 404 MS
T WAVE AXIS: 92 DEGREES
T WAVE AXIS: 97 DEGREES
VENTRICULAR RATE: 75 BPM
VENTRICULAR RATE: 75 BPM

## 2022-04-03 PROCEDURE — 93010 ELECTROCARDIOGRAM REPORT: CPT | Performed by: INTERNAL MEDICINE

## 2022-04-03 NOTE — ED PROVIDER NOTES
History  Chief Complaint   Patient presents with    Hypertension     Pt c/o HTN x2 days, denies symptoms  Also c/o right leg numbness radiating to buttock, hx of same, hx right artery graft       History provided by:  Patient  Hypertension  Severity:  Severe  Onset quality:  Gradual  Duration:  1 week  Timing:  Constant  Progression:  Worsening  Chronicity:  Recurrent  Context comment:  Long history of high blood pressure  , has been increasing over the past week, now over 200 which concerned the patient so he came here for evaluation  Does say he has intermittent numbness of his left foot but this is been for the past week  Relieved by:  Nothing  Worsened by:  Nothing  Ineffective treatments: Patient is on lisinopril and amlodipine  Associated symptoms: no abdominal pain, no chest pain, no dizziness, no fever, no headaches, no nausea, no neck pain, no palpitations, no shortness of breath and not vomiting    Associated symptoms comment:  Numbness of the left foot, mainly over 3rd 4th and 5th toes      Prior to Admission Medications   Prescriptions Last Dose Informant Patient Reported? Taking? amLODIPine (NORVASC) 10 mg tablet 4/2/2022 at Unknown time  No Yes   Sig: Take 1 tablet (10 mg total) by mouth daily   lisinopril (ZESTRIL) 20 mg tablet 4/2/2022 at Unknown time  No Yes   Sig: Take 1 tablet (20 mg total) by mouth daily      Facility-Administered Medications: None       Past Medical History:   Diagnosis Date    Hypertension        Past Surgical History:   Procedure Laterality Date    ARTERY SURGERY  2018       History reviewed  No pertinent family history  I have reviewed and agree with the history as documented      E-Cigarette/Vaping     E-Cigarette/Vaping Substances     Social History     Tobacco Use    Smoking status: Heavy Tobacco Smoker     Packs/day: 1 00     Types: Cigarettes     Start date: 3/11/1975    Smokeless tobacco: Never Used   Substance Use Topics    Alcohol use: Yes     Comment: ocassionally    Drug use: Never       Review of Systems   Constitutional: Negative for activity change, chills, diaphoresis and fever  HENT: Negative for congestion, sinus pressure and sore throat  Eyes: Negative for pain and visual disturbance  Respiratory: Negative for cough, chest tightness, shortness of breath, wheezing and stridor  Cardiovascular: Negative for chest pain and palpitations  Gastrointestinal: Negative for abdominal distention, abdominal pain, constipation, diarrhea, nausea and vomiting  Genitourinary: Negative for dysuria and frequency  Musculoskeletal: Negative for neck pain and neck stiffness  Skin: Negative for rash  Neurological: Positive for numbness  Negative for dizziness, speech difficulty, light-headedness and headaches  Physical Exam  Physical Exam  Vitals reviewed  Constitutional:       General: He is not in acute distress  Appearance: He is well-developed  He is not diaphoretic  HENT:      Head: Normocephalic and atraumatic  Right Ear: External ear normal       Left Ear: External ear normal       Nose: Nose normal    Eyes:      General: No scleral icterus  Right eye: No discharge  Left eye: No discharge  Conjunctiva/sclera: Conjunctivae normal       Pupils: Pupils are equal, round, and reactive to light  Neck:      Trachea: No tracheal deviation  Cardiovascular:      Rate and Rhythm: Normal rate and regular rhythm  Heart sounds: Normal heart sounds  No murmur heard  Pulmonary:      Effort: Pulmonary effort is normal  No respiratory distress  Breath sounds: Normal breath sounds  No stridor  Abdominal:      General: There is no distension  Palpations: Abdomen is soft  Tenderness: There is no abdominal tenderness  There is no guarding or rebound  Musculoskeletal:         General: No deformity  Normal range of motion  Cervical back: Normal range of motion and neck supple     Skin:     General: Skin is warm and dry  Coloration: Skin is not pale  Findings: No erythema or rash  Neurological:      General: No focal deficit present  Mental Status: He is alert and oriented to person, place, and time  Motor: No abnormal muscle tone  Coordination: Coordination normal       Comments: Nonfocal neurological examination other than patient reports some subjective decreased sensation over 3rd and 4th tips of his toes  , not over proximal phalanx, just over the tip/pad of the foot    Otherwise GCS 15 alert oriented x3, full strength and sensation of all of her extremities         Vital Signs  ED Triage Vitals   Temperature Pulse Respirations Blood Pressure SpO2   04/02/22 2047 04/02/22 2047 04/02/22 2047 04/02/22 2049 04/02/22 2047   97 8 °F (36 6 °C) 76 18 (!) 202/91 98 %      Temp Source Heart Rate Source Patient Position - Orthostatic VS BP Location FiO2 (%)   04/02/22 2047 04/02/22 2047 04/02/22 2047 04/02/22 2047 --   Oral Monitor Sitting Left arm       Pain Score       --                  Vitals:    04/02/22 2230 04/02/22 2245 04/02/22 2315 04/02/22 2345   BP: 154/77 157/85 149/83 (!) 188/91   Pulse: 72 74 72 78   Patient Position - Orthostatic VS:             Visual Acuity  Visual Acuity      Most Recent Value   L Pupil Size (mm) 3   R Pupil Size (mm) 3          ED Medications  Medications   Labetalol HCl (NORMODYNE) injection 10 mg (10 mg Intravenous Given 4/2/22 2110)       Diagnostic Studies  Results Reviewed     Procedure Component Value Units Date/Time    HS Troponin I 2hr [[id number]]  (Normal) Collected: 04/02/22 2318    Lab Status: Final result Specimen: Blood from Arm, Right Updated: 04/02/22 2348     hs TnI 2hr 19 ng/L      Delta 2hr hsTnI -2 ng/L     HS Troponin 0hr (reflex protocol) [[id number]]  (Normal) Collected: 04/02/22 2113    Lab Status: Final result Specimen: Blood from Arm, Right Updated: 04/02/22 2151     hs TnI 0hr 21 ng/L     Comprehensive metabolic panel [[id number]] (Abnormal) Collected: 04/02/22 2113    Lab Status: Final result Specimen: Blood from Arm, Right Updated: 04/02/22 2146     Sodium 140 mmol/L      Potassium 4 5 mmol/L      Chloride 102 mmol/L      CO2 30 mmol/L      ANION GAP 8 mmol/L      BUN 21 mg/dL      Creatinine 1 40 mg/dL      Glucose 125 mg/dL      Calcium 9 0 mg/dL      AST 17 U/L      ALT 26 U/L      Alkaline Phosphatase 108 U/L      Total Protein 8 2 g/dL      Albumin 3 9 g/dL      Total Bilirubin 0 25 mg/dL      eGFR 49 ml/min/1 73sq m     Narrative:      [facility] guidelines for Chronic Kidney Disease (CKD):     Stage 1 with normal or high GFR (GFR > 90 mL/min/1 73 square meters)    Stage 2 Mild CKD (GFR = 60-89 mL/min/1 73 square meters)    Stage 3A Moderate CKD (GFR = 45-59 mL/min/1 73 square meters)    Stage 3B Moderate CKD (GFR = 30-44 mL/min/1 73 square meters)    Stage 4 Severe CKD (GFR = 15-29 mL/min/1 73 square meters)    Stage 5 End Stage CKD (GFR <15 mL/min/1 73 square meters)  Note: GFR calculation is accurate only with a steady state creatinine    CBC and differential [[id number]]  (Abnormal) Collected: 04/02/22 2113    Lab Status: Final result Specimen: Blood from Arm, Right Updated: 04/02/22 2134     WBC 9 80 Thousand/uL      RBC 5 65 Million/uL      Hemoglobin 16 8 g/dL      Hematocrit 51 1 %      MCV 90 fL      MCH 29 7 pg      MCHC 32 9 g/dL      RDW 14 8 %      MPV 10 7 fL      Platelets 754 Thousands/uL      nRBC 0 /100 WBCs      Neutrophils Relative 63 %      Immat GRANS % 0 %      Lymphocytes Relative 23 %      Monocytes Relative 10 %      Eosinophils Relative 3 %      Basophils Relative 1 %      Neutrophils Absolute 6 27 Thousands/µL      Immature Grans Absolute 0 04 Thousand/uL      Lymphocytes Absolute 2 21 Thousands/µL      Monocytes Absolute 0 93 Thousand/µL      Eosinophils Absolute 0 29 Thousand/µL      Basophils Absolute 0 06 Thousands/µL                  CT head without contrast   Final Result by Maren Ortiz MD (04/02 2208)      No acute intracranial abnormality  Workstation performed: [id number]                    Procedures  Procedures         ED Course                               SBIRT 20yo+      Most Recent Value   SBIRT (24 yo +)    In order to provide better care to our patients, we are screening all of our patients for alcohol and drug use  Would it be okay to ask you these screening questions? Yes Filed at: 04/02/2022 2102   Initial Alcohol Screen: US AUDIT-C     1  How often do you have a drink containing alcohol? 2 Filed at: 04/02/2022 2102   2  How many drinks containing alcohol do you have on a typical day you are drinking? 1 Filed at: 04/02/2022 2102   3a  Male UNDER 65: How often do you have five or more drinks on one occasion? 0 Filed at: 04/02/2022 2102   3b  FEMALE Any Age, or MALE 65+: How often do you have 4 or more drinks on one occassion? 0 Filed at: 04/02/2022 2102   Audit-C Score 3 Filed at: 04/02/2022 2102   [place]: How many times in the past year have you    Used an illegal drug or used a prescription medication for non-medical reasons? Never Filed at: 04/02/2022 2102                    MDM  Number of Diagnoses or Management Options  Hypertensive urgency: new and requires workup  Diagnosis management comments: 41-year-old male, hypertensive, will check for signs of end-organ damage will decreased blood pressure with labetalol    Delta troponins negative  , no signs of end-organ damage  , patient to be discharged will follow up PCP       Amount and/or Complexity of Data Reviewed  Clinical lab tests: ordered and reviewed  Review and summarize past medical records: yes  Independent visualization of images, tracings, or specimens: yes        Disposition  Final diagnoses:   Hypertensive urgency     Time reflects when diagnosis was documented in both MDM as applicable and the Disposition within this note     Time User Action Codes Description Comment    4/3/2022 12:22 AM Jennifer Zuñiga Add [I16 0] Hypertensive urgency       ED Disposition     ED Disposition Condition Date/Time Comment    Discharge Stable Sun Apr 3, 2022 12:22 AM Josh Olsen discharge to home/self care  Follow-up Information     Follow up With Specialties Details Why Contact Info    Julieta Ovalles MD Internal Medicine Call in 1 day To arrange for the next available appointment [address]   [address] [address]  [phone]            Patient's Medications   Discharge Prescriptions    No medications on file       No discharge procedures on file      PDMP Review     None          ED Provider  Electronically Signed by           Luis Fontanez DO  04/03/22 6779

## 2022-06-28 ENCOUNTER — APPOINTMENT (EMERGENCY)
Dept: RADIOLOGY | Facility: HOSPITAL | Age: 72
End: 2022-06-28
Payer: COMMERCIAL

## 2022-06-28 ENCOUNTER — APPOINTMENT (OUTPATIENT)
Dept: NON INVASIVE DIAGNOSTICS | Facility: HOSPITAL | Age: 72
End: 2022-06-28
Payer: COMMERCIAL

## 2022-06-28 ENCOUNTER — RA CDI HCC (OUTPATIENT)
Dept: OTHER | Facility: HOSPITAL | Age: 72
End: 2022-06-28

## 2022-06-28 ENCOUNTER — HOSPITAL ENCOUNTER (OUTPATIENT)
Facility: HOSPITAL | Age: 72
Setting detail: OBSERVATION
Discharge: HOME | End: 2022-06-29
Attending: EMERGENCY MEDICINE | Admitting: INTERNAL MEDICINE
Payer: COMMERCIAL

## 2022-06-28 DIAGNOSIS — R07.9 CHEST PAIN, UNSPECIFIED TYPE: Primary | ICD-10-CM

## 2022-06-28 DIAGNOSIS — Z95.5 S/P DRUG ELUTING CORONARY STENT PLACEMENT: ICD-10-CM

## 2022-06-28 DIAGNOSIS — I21.4 NSTEMI (NON-ST ELEVATED MYOCARDIAL INFARCTION) (HCC): ICD-10-CM

## 2022-06-28 DIAGNOSIS — I25.10 CAD (CORONARY ARTERY DISEASE): ICD-10-CM

## 2022-06-28 PROBLEM — I73.9 PAD (PERIPHERAL ARTERY DISEASE) (HCC): Status: ACTIVE | Noted: 2021-03-24

## 2022-06-28 LAB
2HR DELTA HS TROPONIN: 69 NG/L
4HR DELTA HS TROPONIN: 411 NG/L
ALBUMIN SERPL BCP-MCNC: 4 G/DL (ref 3.5–5)
ALP SERPL-CCNC: 87 U/L (ref 34–104)
ALT SERPL W P-5'-P-CCNC: 14 U/L (ref 7–52)
ANION GAP SERPL CALCULATED.3IONS-SCNC: 8 MMOL/L (ref 4–13)
AORTIC ROOT: 3.5 CM
APICAL FOUR CHAMBER EJECTION FRACTION: 57 %
APTT PPP: 32 SECONDS (ref 23–37)
APTT PPP: 95 SECONDS (ref 23–37)
ASCENDING AORTA: 3.9 CM
AST SERPL W P-5'-P-CCNC: 17 U/L (ref 13–39)
ATRIAL RATE: 92 BPM
AV PEAK GRADIENT: 14 MMHG
AV REGURGITATION PRESSURE HALF TIME: 457 MS
BASOPHILS # BLD AUTO: 0.08 THOUSANDS/ΜL (ref 0–0.1)
BASOPHILS NFR BLD AUTO: 1 % (ref 0–1)
BILIRUB SERPL-MCNC: 0.14 MG/DL (ref 0.2–1)
BUN SERPL-MCNC: 25 MG/DL (ref 5–25)
CALCIUM SERPL-MCNC: 9.2 MG/DL (ref 8.4–10.2)
CARDIAC TROPONIN I PNL SERPL HS: 110 NG/L
CARDIAC TROPONIN I PNL SERPL HS: 41 NG/L
CARDIAC TROPONIN I PNL SERPL HS: 452 NG/L
CHLORIDE SERPL-SCNC: 105 MMOL/L (ref 96–108)
CHOLEST SERPL-MCNC: 157 MG/DL
CO2 SERPL-SCNC: 25 MMOL/L (ref 21–32)
CREAT SERPL-MCNC: 1.2 MG/DL (ref 0.6–1.3)
E WAVE DECELERATION TIME: 257 MS
EOSINOPHIL # BLD AUTO: 0.4 THOUSAND/ΜL (ref 0–0.61)
EOSINOPHIL NFR BLD AUTO: 4 % (ref 0–6)
ERYTHROCYTE [DISTWIDTH] IN BLOOD BY AUTOMATED COUNT: 15.9 % (ref 11.6–15.1)
FRACTIONAL SHORTENING: 22 % (ref 28–44)
GFR SERPL CREATININE-BSD FRML MDRD: 59 ML/MIN/1.73SQ M
GLOBAL LONGITUIDAL STRAIN: -15 %
GLUCOSE SERPL-MCNC: 145 MG/DL (ref 65–140)
HCT VFR BLD AUTO: 46.7 % (ref 36.5–49.3)
HDLC SERPL-MCNC: 34 MG/DL
HGB BLD-MCNC: 15.3 G/DL (ref 12–17)
IMM GRANULOCYTES # BLD AUTO: 0.04 THOUSAND/UL (ref 0–0.2)
IMM GRANULOCYTES NFR BLD AUTO: 0 % (ref 0–2)
INR PPP: 0.94 (ref 0.84–1.19)
INTERVENTRICULAR SEPTUM IN DIASTOLE (PARASTERNAL SHORT AXIS VIEW): 1.3 CM
INTERVENTRICULAR SEPTUM: 1.3 CM (ref 0.6–1.1)
KCT BLD-ACNC: 269 SEC (ref 89–137)
KCT BLD-ACNC: 309 SEC (ref 89–137)
LAAS-AP2: 20.6 CM2
LAAS-AP4: 13.3 CM2
LDLC SERPL CALC-MCNC: 104 MG/DL (ref 0–100)
LEFT ATRIUM SIZE: 3.1 CM
LEFT INTERNAL DIMENSION IN SYSTOLE: 3.2 CM (ref 2.1–4)
LEFT VENTRICULAR INTERNAL DIMENSION IN DIASTOLE: 4.1 CM (ref 3.5–6)
LEFT VENTRICULAR POSTERIOR WALL IN END DIASTOLE: 1.3 CM
LEFT VENTRICULAR STROKE VOLUME: 31 ML
LVSV (TEICH): 31 ML
LYMPHOCYTES # BLD AUTO: 2.53 THOUSANDS/ΜL (ref 0.6–4.47)
LYMPHOCYTES NFR BLD AUTO: 24 % (ref 14–44)
MCH RBC QN AUTO: 29.4 PG (ref 26.8–34.3)
MCHC RBC AUTO-ENTMCNC: 32.8 G/DL (ref 31.4–37.4)
MCV RBC AUTO: 90 FL (ref 82–98)
MONOCYTES # BLD AUTO: 1.33 THOUSAND/ΜL (ref 0.17–1.22)
MONOCYTES NFR BLD AUTO: 13 % (ref 4–12)
MV E'TISSUE VEL-SEP: 6 CM/S
MV PEAK A VEL: 0.94 M/S
MV PEAK E VEL: 68 CM/S
MV STENOSIS PRESSURE HALF TIME: 74 MS
MV VALVE AREA P 1/2 METHOD: 2.97 CM2
NEUTROPHILS # BLD AUTO: 6.19 THOUSANDS/ΜL (ref 1.85–7.62)
NEUTS SEG NFR BLD AUTO: 58 % (ref 43–75)
NONHDLC SERPL-MCNC: 123 MG/DL
NRBC BLD AUTO-RTO: 0 /100 WBCS
P AXIS: 85 DEGREES
PA SYSTOLIC PRESSURE: 19 MMHG
PLATELET # BLD AUTO: 258 THOUSANDS/UL (ref 149–390)
PMV BLD AUTO: 11.2 FL (ref 8.9–12.7)
POTASSIUM SERPL-SCNC: 3.9 MMOL/L (ref 3.5–5.3)
PR INTERVAL: 186 MS
PROT SERPL-MCNC: 6.7 G/DL (ref 6.4–8.4)
PROTHROMBIN TIME: 12.6 SECONDS (ref 11.6–14.5)
QRS AXIS: 72 DEGREES
QRSD INTERVAL: 82 MS
QT INTERVAL: 330 MS
QTC INTERVAL: 400 MS
RBC # BLD AUTO: 5.2 MILLION/UL (ref 3.88–5.62)
RIGHT ATRIAL 2D VOLUME: 16 ML
RIGHT ATRIUM AREA SYSTOLE A4C: 9.9 CM2
RIGHT VENTRICLE ID DIMENSION: 2.9 CM
SL CV AV DECELERATION TIME RETROGRADE: 1576 MS
SL CV AV PEAK GRADIENT RETROGRADE: 90 MMHG
SL CV LEFT ATRIUM LENGTH A2C: 6 CM
SL CV LV EF: 60
SL CV PED ECHO LEFT VENTRICLE DIASTOLIC VOLUME (MOD BIPLANE) 2D: 73 ML
SL CV PED ECHO LEFT VENTRICLE SYSTOLIC VOLUME (MOD BIPLANE) 2D: 42 ML
SODIUM SERPL-SCNC: 138 MMOL/L (ref 135–147)
SPECIMEN SOURCE: ABNORMAL
SPECIMEN SOURCE: ABNORMAL
T WAVE AXIS: -20 DEGREES
TR MAX PG: 16 MMHG
TR PEAK VELOCITY: 2 M/S
TRICUSPID VALVE PEAK REGURGITATION VELOCITY: 1.98 M/S
TRIGL SERPL-MCNC: 94 MG/DL
VENTRICULAR RATE: 88 BPM
WBC # BLD AUTO: 10.57 THOUSAND/UL (ref 4.31–10.16)

## 2022-06-28 PROCEDURE — C9601 PERC DRUG-EL COR STENT BRAN: HCPCS | Performed by: INTERNAL MEDICINE

## 2022-06-28 PROCEDURE — 92979 ENDOLUMINL IVUS OCT C EA: CPT | Performed by: INTERNAL MEDICINE

## 2022-06-28 PROCEDURE — 85610 PROTHROMBIN TIME: CPT | Performed by: PHYSICIAN ASSISTANT

## 2022-06-28 PROCEDURE — C1874 STENT, COATED/COV W/DEL SYS: HCPCS | Performed by: INTERNAL MEDICINE

## 2022-06-28 PROCEDURE — 85730 THROMBOPLASTIN TIME PARTIAL: CPT | Performed by: PHYSICIAN ASSISTANT

## 2022-06-28 PROCEDURE — 93454 CORONARY ARTERY ANGIO S&I: CPT | Performed by: INTERNAL MEDICINE

## 2022-06-28 PROCEDURE — C9600 PERC DRUG-EL COR STENT SING: HCPCS | Performed by: INTERNAL MEDICINE

## 2022-06-28 PROCEDURE — 80061 LIPID PANEL: CPT | Performed by: PHYSICIAN ASSISTANT

## 2022-06-28 PROCEDURE — 99152 MOD SED SAME PHYS/QHP 5/>YRS: CPT | Performed by: INTERNAL MEDICINE

## 2022-06-28 PROCEDURE — 99205 OFFICE O/P NEW HI 60 MIN: CPT | Performed by: INTERNAL MEDICINE

## 2022-06-28 PROCEDURE — C1753 CATH, INTRAVAS ULTRASOUND: HCPCS | Performed by: INTERNAL MEDICINE

## 2022-06-28 PROCEDURE — NC001 PR NO CHARGE: Performed by: INTERNAL MEDICINE

## 2022-06-28 PROCEDURE — 85347 COAGULATION TIME ACTIVATED: CPT

## 2022-06-28 PROCEDURE — C1894 INTRO/SHEATH, NON-LASER: HCPCS | Performed by: INTERNAL MEDICINE

## 2022-06-28 PROCEDURE — C1887 CATHETER, GUIDING: HCPCS | Performed by: INTERNAL MEDICINE

## 2022-06-28 PROCEDURE — 71045 X-RAY EXAM CHEST 1 VIEW: CPT

## 2022-06-28 PROCEDURE — 99285 EMERGENCY DEPT VISIT HI MDM: CPT | Performed by: EMERGENCY MEDICINE

## 2022-06-28 PROCEDURE — 92978 ENDOLUMINL IVUS OCT C 1ST: CPT | Performed by: INTERNAL MEDICINE

## 2022-06-28 PROCEDURE — 92928 PRQ TCAT PLMT NTRAC ST 1 LES: CPT | Performed by: INTERNAL MEDICINE

## 2022-06-28 PROCEDURE — C1769 GUIDE WIRE: HCPCS | Performed by: INTERNAL MEDICINE

## 2022-06-28 PROCEDURE — C1725 CATH, TRANSLUMIN NON-LASER: HCPCS | Performed by: INTERNAL MEDICINE

## 2022-06-28 PROCEDURE — 93010 ELECTROCARDIOGRAM REPORT: CPT | Performed by: INTERNAL MEDICINE

## 2022-06-28 PROCEDURE — 93306 TTE W/DOPPLER COMPLETE: CPT

## 2022-06-28 PROCEDURE — 99153 MOD SED SAME PHYS/QHP EA: CPT | Performed by: INTERNAL MEDICINE

## 2022-06-28 PROCEDURE — 84484 ASSAY OF TROPONIN QUANT: CPT | Performed by: PHYSICIAN ASSISTANT

## 2022-06-28 PROCEDURE — 93571 IV DOP VEL&/PRESS C FLO 1ST: CPT | Performed by: INTERNAL MEDICINE

## 2022-06-28 PROCEDURE — 85025 COMPLETE CBC W/AUTO DIFF WBC: CPT

## 2022-06-28 PROCEDURE — 93005 ELECTROCARDIOGRAM TRACING: CPT

## 2022-06-28 PROCEDURE — 93306 TTE W/DOPPLER COMPLETE: CPT | Performed by: INTERNAL MEDICINE

## 2022-06-28 PROCEDURE — 80053 COMPREHEN METABOLIC PANEL: CPT

## 2022-06-28 PROCEDURE — 99285 EMERGENCY DEPT VISIT HI MDM: CPT

## 2022-06-28 PROCEDURE — 36415 COLL VENOUS BLD VENIPUNCTURE: CPT

## 2022-06-28 PROCEDURE — 84484 ASSAY OF TROPONIN QUANT: CPT

## 2022-06-28 PROCEDURE — 99220 PR INITIAL OBSERVATION CARE/DAY 70 MINUTES: CPT | Performed by: PHYSICIAN ASSISTANT

## 2022-06-28 DEVICE — XIENCE SKYPOINT™ EVEROLIMUS ELUTING CORONARY STENT SYSTEM 2.75 MM X 18 MM / RAPID-EXCHANGE
Type: IMPLANTABLE DEVICE | Site: CORONARY | Status: FUNCTIONAL
Brand: XIENCE SKYPOINT™

## 2022-06-28 DEVICE — XIENCE SKYPOINT™ EVEROLIMUS ELUTING CORONARY STENT SYSTEM 3.00 MM X 18 MM / RAPID-EXCHANGE
Type: IMPLANTABLE DEVICE | Site: CORONARY | Status: FUNCTIONAL
Brand: XIENCE SKYPOINT™

## 2022-06-28 DEVICE — XIENCE SKYPOINT™ EVEROLIMUS ELUTING CORONARY STENT SYSTEM 3.50 MM X 23 MM / RAPID-EXCHANGE
Type: IMPLANTABLE DEVICE | Site: CORONARY | Status: FUNCTIONAL
Brand: XIENCE SKYPOINT™

## 2022-06-28 RX ORDER — LISINOPRIL 20 MG/1
20 TABLET ORAL DAILY
Status: DISCONTINUED | OUTPATIENT
Start: 2022-06-28 | End: 2022-06-28

## 2022-06-28 RX ORDER — ENOXAPARIN SODIUM 100 MG/ML
40 INJECTION SUBCUTANEOUS DAILY
Status: DISCONTINUED | OUTPATIENT
Start: 2022-06-28 | End: 2022-06-28

## 2022-06-28 RX ORDER — SODIUM CHLORIDE 9 MG/ML
INJECTION, SOLUTION INTRAVENOUS
Status: COMPLETED | OUTPATIENT
Start: 2022-06-28 | End: 2022-06-28

## 2022-06-28 RX ORDER — ASPIRIN 81 MG/1
81 TABLET, CHEWABLE ORAL DAILY
Status: DISCONTINUED | OUTPATIENT
Start: 2022-06-28 | End: 2022-06-29 | Stop reason: HOSPADM

## 2022-06-28 RX ORDER — ASPIRIN 81 MG/1
81 TABLET, CHEWABLE ORAL DAILY
Status: DISCONTINUED | OUTPATIENT
Start: 2022-06-29 | End: 2022-06-28

## 2022-06-28 RX ORDER — HEPARIN SODIUM 1000 [USP'U]/ML
4000 INJECTION, SOLUTION INTRAVENOUS; SUBCUTANEOUS
Status: DISCONTINUED | OUTPATIENT
Start: 2022-06-28 | End: 2022-06-28

## 2022-06-28 RX ORDER — NITROGLYCERIN 20 MG/100ML
INJECTION INTRAVENOUS AS NEEDED
Status: DISCONTINUED | OUTPATIENT
Start: 2022-06-28 | End: 2022-06-28 | Stop reason: HOSPADM

## 2022-06-28 RX ORDER — AMLODIPINE BESYLATE 10 MG/1
10 TABLET ORAL DAILY
Status: DISCONTINUED | OUTPATIENT
Start: 2022-06-28 | End: 2022-06-29 | Stop reason: HOSPADM

## 2022-06-28 RX ORDER — HEPARIN SODIUM 1000 [USP'U]/ML
INJECTION, SOLUTION INTRAVENOUS; SUBCUTANEOUS AS NEEDED
Status: DISCONTINUED | OUTPATIENT
Start: 2022-06-28 | End: 2022-06-28 | Stop reason: HOSPADM

## 2022-06-28 RX ORDER — HEPARIN SODIUM 10000 [USP'U]/100ML
3-20 INJECTION, SOLUTION INTRAVENOUS
Status: DISCONTINUED | OUTPATIENT
Start: 2022-06-28 | End: 2022-06-28

## 2022-06-28 RX ORDER — ASPIRIN 81 MG/1
243 TABLET, CHEWABLE ORAL ONCE
Status: COMPLETED | OUTPATIENT
Start: 2022-06-28 | End: 2022-06-28

## 2022-06-28 RX ORDER — HEPARIN SODIUM 1000 [USP'U]/ML
2000 INJECTION, SOLUTION INTRAVENOUS; SUBCUTANEOUS
Status: DISCONTINUED | OUTPATIENT
Start: 2022-06-28 | End: 2022-06-28

## 2022-06-28 RX ORDER — LIDOCAINE HYDROCHLORIDE 10 MG/ML
INJECTION, SOLUTION EPIDURAL; INFILTRATION; INTRACAUDAL; PERINEURAL AS NEEDED
Status: DISCONTINUED | OUTPATIENT
Start: 2022-06-28 | End: 2022-06-28 | Stop reason: HOSPADM

## 2022-06-28 RX ORDER — FENTANYL CITRATE 50 UG/ML
INJECTION, SOLUTION INTRAMUSCULAR; INTRAVENOUS AS NEEDED
Status: DISCONTINUED | OUTPATIENT
Start: 2022-06-28 | End: 2022-06-28 | Stop reason: HOSPADM

## 2022-06-28 RX ORDER — ATORVASTATIN CALCIUM 40 MG/1
40 TABLET, FILM COATED ORAL
Status: DISCONTINUED | OUTPATIENT
Start: 2022-06-28 | End: 2022-06-29 | Stop reason: HOSPADM

## 2022-06-28 RX ORDER — ACETAMINOPHEN 325 MG/1
650 TABLET ORAL EVERY 6 HOURS PRN
Status: DISCONTINUED | OUTPATIENT
Start: 2022-06-28 | End: 2022-06-29 | Stop reason: HOSPADM

## 2022-06-28 RX ORDER — MIDAZOLAM HYDROCHLORIDE 2 MG/2ML
INJECTION, SOLUTION INTRAMUSCULAR; INTRAVENOUS AS NEEDED
Status: DISCONTINUED | OUTPATIENT
Start: 2022-06-28 | End: 2022-06-28 | Stop reason: HOSPADM

## 2022-06-28 RX ORDER — SODIUM CHLORIDE 9 MG/ML
100 INJECTION, SOLUTION INTRAVENOUS CONTINUOUS
Status: DISCONTINUED | OUTPATIENT
Start: 2022-06-28 | End: 2022-06-29

## 2022-06-28 RX ORDER — VERAPAMIL HCL 2.5 MG/ML
AMPUL (ML) INTRAVENOUS AS NEEDED
Status: DISCONTINUED | OUTPATIENT
Start: 2022-06-28 | End: 2022-06-28 | Stop reason: HOSPADM

## 2022-06-28 RX ORDER — SODIUM CHLORIDE 9 MG/ML
125 INJECTION, SOLUTION INTRAVENOUS CONTINUOUS
Status: DISCONTINUED | OUTPATIENT
Start: 2022-06-28 | End: 2022-06-28

## 2022-06-28 RX ORDER — ASPIRIN 81 MG/1
243 TABLET, CHEWABLE ORAL ONCE
Status: CANCELLED | OUTPATIENT
Start: 2022-06-28 | End: 2022-06-28

## 2022-06-28 RX ORDER — ASPIRIN 81 MG/1
324 TABLET, CHEWABLE ORAL ONCE
Status: DISCONTINUED | OUTPATIENT
Start: 2022-06-28 | End: 2022-06-28

## 2022-06-28 RX ORDER — HEPARIN SODIUM 1000 [USP'U]/ML
4000 INJECTION, SOLUTION INTRAVENOUS; SUBCUTANEOUS ONCE
Status: COMPLETED | OUTPATIENT
Start: 2022-06-28 | End: 2022-06-28

## 2022-06-28 RX ADMIN — Medication 12.5 MG: at 09:04

## 2022-06-28 RX ADMIN — LISINOPRIL 20 MG: 20 TABLET ORAL at 09:04

## 2022-06-28 RX ADMIN — ASPIRIN 81 MG CHEWABLE TABLET 243 MG: 81 TABLET CHEWABLE at 03:24

## 2022-06-28 RX ADMIN — ATORVASTATIN CALCIUM 40 MG: 40 TABLET, FILM COATED ORAL at 16:28

## 2022-06-28 RX ADMIN — HEPARIN SODIUM 12 UNITS/KG/HR: 10000 INJECTION, SOLUTION INTRAVENOUS at 07:51

## 2022-06-28 RX ADMIN — SODIUM CHLORIDE 100 ML/HR: 0.9 INJECTION, SOLUTION INTRAVENOUS at 16:45

## 2022-06-28 RX ADMIN — ASPIRIN 81 MG CHEWABLE TABLET 81 MG: 81 TABLET CHEWABLE at 10:41

## 2022-06-28 RX ADMIN — AMLODIPINE BESYLATE 10 MG: 10 TABLET ORAL at 09:04

## 2022-06-28 RX ADMIN — HEPARIN SODIUM 4000 UNITS: 1000 INJECTION INTRAVENOUS; SUBCUTANEOUS at 09:09

## 2022-06-28 RX ADMIN — METOPROLOL TARTRATE 25 MG: 25 TABLET, FILM COATED ORAL at 20:55

## 2022-06-28 NOTE — ASSESSMENT & PLAN NOTE
· Patient presented with transient chest pain and diaphoresis which resolved after about 1 hour  · EKG: NSR, T wave inversions III, aVR, aVL  · Serial troponins: 0h - nml (41), 2h - elevated (110), 4h - elevated (452)  · CXR: no acute findings noted   · Echo: Global longitudinal strain is reduced at -15%  Diastolic function is mildly abnormal, consistent with grade I (abnormal) relaxation  Hypokinesis in: basal inferior, basal inferolateral, mid inferior and mid inferolateral segments    · Cardiac catheterization performed yesterday which showed: 50% LAD stenosis, 70% LCx stenosis, 70% mid-RCA stenosis, 90% distal RCA stenosis; angioplasty/stent placement were performed on the LCx, mid-RCA, and distal-RCA which each showed CLAY flow 3 perfusion and 0% residual stenosis post-intervention  · Patient appears well post-procedure with stable vital signs and unremarkable labs    Plan:  · Discharge patient with: ASA 81 mg qd, Atorvastatin 40 mg qd, Brilinta 90 mg q12h, Metoprolol Tartrate 12 5 mg q12h  · Follow-up with cardiology on discharge  · Repeat BMP in 3 days  · Outpatient cardiac rehab

## 2022-06-28 NOTE — ASSESSMENT & PLAN NOTE
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

## 2022-06-28 NOTE — QUICK NOTE
Notified by nursing that patient's 2nd troponin is elevated to 110 from initial troponin of 41  Per nursing, patient without chest pain at this time  Will started IV heparin for ACS  Cardiology consulted  Start metoprolol 12 5 mg BID and atorvastatin 40 mg BID  Obtain echo  Keep NPO pending cardiology evaluation

## 2022-06-28 NOTE — PLAN OF CARE
Problem: Potential for Falls  Goal: Patient will remain free of falls  Description: INTERVENTIONS:  - Educate patient/family on patient safety including physical limitations  - Instruct patient to call for assistance with activity   - Consult OT/PT to assist with strengthening/mobility   - Keep Call bell within reach  - Keep bed low and locked with side rails adjusted as appropriate  - Keep care items and personal belongings within reach  - Initiate and maintain comfort rounds  - Make Fall Risk Sign visible to staff  - Obtain necessary fall risk management equipment  - Apply yellow socks and bracelet for high fall risk patients  - Consider moving patient to room near nurses station  Outcome: Progressing     Problem: PAIN - ADULT  Goal: Verbalizes/displays adequate comfort level or baseline comfort level  Description: Interventions:  - Encourage patient to monitor pain and request assistance  - Assess pain using appropriate pain scale  - Administer analgesics based on type and severity of pain and evaluate response  - Implement non-pharmacological measures as appropriate and evaluate response  - Consider cultural and social influences on pain and pain management  - Notify physician/advanced practitioner if interventions unsuccessful or patient reports new pain  Outcome: Progressing     Problem: INFECTION - ADULT  Goal: Absence or prevention of progression during hospitalization  Description: INTERVENTIONS:  - Assess and monitor for signs and symptoms of infection  - Monitor lab/diagnostic results  - Monitor all insertion sites, i e  indwelling lines, tubes, and drains  - Monitor endotracheal if appropriate and nasal secretions for changes in amount and color  - [place] appropriate cooling/warming therapies per order  - Administer medications as ordered  - Instruct and encourage patient and family to use good hand hygiene technique  - Identify and instruct in appropriate isolation precautions for identified infection/condition  Outcome: Progressing     Problem: SAFETY ADULT  Goal: Patient will remain free of falls  Description: INTERVENTIONS:  - Educate patient/family on patient safety including physical limitations  - Instruct patient to call for assistance with activity   - Consult OT/PT to assist with strengthening/mobility   - Keep Call bell within reach  - Keep bed low and locked with side rails adjusted as appropriate  - Keep care items and personal belongings within reach  - Initiate and maintain comfort rounds  - Make Fall Risk Sign visible to staff  - Obtain necessary fall risk management equipment  - Apply yellow socks and bracelet for high fall risk patients  - Consider moving patient to room near nurses station  Outcome: Progressing     Problem: DISCHARGE PLANNING  Goal: Discharge to home or other facility with appropriate resources  Description: INTERVENTIONS:  - Identify barriers to discharge w/patient and caregiver  - Arrange for needed discharge resources and transportation as appropriate  - Identify discharge learning needs (meds, wound care, etc )  - Arrange for interpretive services to assist at discharge as needed  - Refer to Case Management Department for coordinating discharge planning if the patient needs post-hospital services based on physician/advanced practitioner order or complex needs related to functional status, cognitive ability, or social support system  Outcome: Progressing     Problem: Knowledge Deficit  Goal: Patient/family/caregiver demonstrates understanding of disease process, treatment plan, medications, and discharge instructions  Description: Complete learning assessment and assess knowledge base    Interventions:  - Provide teaching at level of understanding  - Provide teaching via preferred learning methods  Outcome: Progressing     Problem: CARDIOVASCULAR - ADULT  Goal: Maintains optimal cardiac output and hemodynamic stability  Description: INTERVENTIONS:  - Monitor I/O, vital signs and rhythm  - Monitor for S/S and trends of decreased cardiac output  - Administer and titrate ordered vasoactive medications to optimize hemodynamic stability  - Assess quality of pulses, skin color and temperature  - Assess for signs of decreased coronary artery perfusion  - Instruct patient to report change in severity of symptoms  Outcome: Progressing  Goal: Absence of cardiac dysrhythmias or at baseline rhythm  Description: INTERVENTIONS:  - Continuous cardiac monitoring, vital signs, obtain 12 lead EKG if ordered  - Administer antiarrhythmic and heart rate control medications as ordered  - Monitor electrolytes and administer replacement therapy as ordered  Outcome: Progressing

## 2022-06-28 NOTE — PLAN OF CARE
Problem: Potential for Falls  Goal: Patient will remain free of falls  Description: INTERVENTIONS:  - Educate patient/family on patient safety including physical limitations  - Instruct patient to call for assistance with activity   - Consult OT/PT to assist with strengthening/mobility   - Keep Call bell within reach  - Keep bed low and locked with side rails adjusted as appropriate  - Keep care items and personal belongings within reach  - Initiate and maintain comfort rounds  - Make Fall Risk Sign visible to staff  - Offer Toileting every  Hours, in advance of need  - Initiate/Maintain alarm  - Obtain necessary fall risk management equipment:   - Apply yellow socks and bracelet for high fall risk patients  - Consider moving patient to room near nurses station  Outcome: Progressing     Problem: PAIN - ADULT  Goal: Verbalizes/displays adequate comfort level or baseline comfort level  Description: Interventions:  - Encourage patient to monitor pain and request assistance  - Assess pain using appropriate pain scale  - Administer analgesics based on type and severity of pain and evaluate response  - Implement non-pharmacological measures as appropriate and evaluate response  - Consider cultural and social influences on pain and pain management  - Notify physician/advanced practitioner if interventions unsuccessful or patient reports new pain  Outcome: Progressing     Problem: INFECTION - ADULT  Goal: Absence or prevention of progression during hospitalization  Description: INTERVENTIONS:  - Assess and monitor for signs and symptoms of infection  - Monitor lab/diagnostic results  - Monitor all insertion sites, i e  indwelling lines, tubes, and drains  - Monitor endotracheal if appropriate and nasal secretions for changes in amount and color  - [place] appropriate cooling/warming therapies per order  - Administer medications as ordered  - Instruct and encourage patient and family to use good hand hygiene technique  - Identify and instruct in appropriate isolation precautions for identified infection/condition  Outcome: Progressing     Problem: SAFETY ADULT  Goal: Patient will remain free of falls  Description: INTERVENTIONS:  - Educate patient/family on patient safety including physical limitations  - Instruct patient to call for assistance with activity   - Consult OT/PT to assist with strengthening/mobility   - Keep Call bell within reach  - Keep bed low and locked with side rails adjusted as appropriate  - Keep care items and personal belongings within reach  - Initiate and maintain comfort rounds  - Make Fall Risk Sign visible to staff  - Offer Toileting earl Hours, in advance of need  - Initiate/Maintain alarm  - Obtain necessary fall risk management equipment:   - Apply yellow socks and bracelet for high fall risk patients  - Consider moving patient to room near nurses station  Outcome: Progressing

## 2022-06-28 NOTE — CONSULTS
Cardiology   Sharee Jasmine 67 y o  male MRN: [id number]  Unit/Bed#: S -01 Encounter: [id number]      Reason for Consult / Principal Problem:  Chest pain/elevated troponin  Physician Requesting Consult:  Radha Boyer MD    Outpatient Cardiologist:  None    Assessment  1  CP, elevated troponin - probable NSTEMI  -Currently without complaints of CP   -HS troponin levels; 41 -- 110 -- 452  -ECG on admission 6/27 - NSR, ST T-wave abnormalities noted in the inferior/lateral leads   -On aspirin, statin, and BB   -On IV heparin GTT ACS low protocol  2  PAD s/p prior R femoral-popliteal bypass (11/2017)  -On aspirin, and high-intensity statin  3  Essential hypertension  -BP last recorded 149/86, HR 72   -Outpatient BP regimen; amlodipine 10 mg daily, and lisinopril 20 mg daily   -Inpatient BP regimen; amlodipine 10 mg daily, lisinopril 20 mg daily, and metoprolol tartrate 12 5 mg q 12 hours  4  Dyslipidemia  -Lipid profile 6/28/2022 - cholesterol 157, triglycerides 94, HDL 34, and   -Previously on no statin, started on atorvastatin 40 mg daily (this admission)  5  Nicotine dependence  -Smoking cessation  -Nicotine patch ordered  Plan  -Presentation concerning for ACS/probable NSTEMI  He does have risk factors for CAD  -Would recommend proceeding with [facility] procedure for definitive ischemic evaluation   -Maintain NPO status  IV fluids for hydration   -Follow-up TTE imaging results   -Continue IV heparin GTT ACS low protocol   -Continue low-dose aspirin, and high-intensity statin   -Increase metoprolol tartrate to 25 mg q 12 hours   -Hold ACEI  Continue amlodipine   -Monitor renal function and electrolytes closely  -Monitor on telemetry  HPI: Joycelyn Akers Ci 67y o  year old male with a medical history of PAD s/p prior R femoral-popliteal bypass (11/2017), essential hypertension, dyslipidemia, and longstanding nicotine dependence (1 pack per day for 50 years)    He has no significant family history for heart disease  He did not previously follow with a routine Cardiology provider as an outpatient  He is quite active at baseline  He does not report any prior symptoms of exertional chest pain or dyspnea with mild-to-moderate level activities  He states yesterday evening he was sitting down in his living room eating fruit and had a sudden onset of severe midsternal 'burning-type' chest pain with diffuse radiation across his chest and down both of his arms with associated diaphoresis  He states he got up and walked around outside for a while without any significant improvement in his symptoms  After about an hour to an hour and a half of persistent symptoms he took 1 low-dose aspirin and then drove himself into the ED for further evaluation  He states his symptoms were improved upon his arrival to the ED but still had subtle chest pain  Further workup in the ED  Hemodynamics on admission  -Temp 97 8° F, HR 85, RR 18, /81, sat 96% on RA  Laboratory data on admission  -NA +138, K +3 9, chloride 105, CO2 25, anion gap 8, BUN 25, creatinine 1 2, glucose 145, calcium 9 2, ALT 14, AST 17, alk-phos 87, albumin 4 0, and T bili 0 14  WBC 10 6, HGB 15 3, and platelet count 704  -HS troponin levels 41 -- 110 -- 552  Imaging  -Chest x-ray - no acute cardiopulmonary disease  Initial 12 lead ECG demonstrated NSR, with ST T-wave abnormalities noted in the inferior/lateral leads  Cardiology was consulted further treatment recommendations/management  Family History: History reviewed  No pertinent family history    Historical Information   Past Medical History:   Diagnosis Date    Hypertension      Past Surgical History:   Procedure Laterality Date    ARTERY SURGERY  2018     Social History   Social History     Substance and Sexual Activity   Alcohol Use Yes    Comment: ocassionally     Social History     Substance and Sexual Activity   Drug Use Never     Social History     Tobacco Use   Smoking Status Heavy Tobacco Smoker    Packs/day: 0 50    Types: Cigarettes    Start date: 3/11/1975   Smokeless Tobacco Never Used     Family History: History reviewed  No pertinent family history  Review of Systems:  Review of Systems   Constitutional: Negative for chills, fatigue and fever  Eyes: Negative for visual disturbance  Respiratory: Negative for cough, chest tightness and shortness of breath  Cardiovascular: Negative for chest pain, palpitations and leg swelling  Gastrointestinal: Negative for abdominal pain  Neurological: Negative for dizziness, light-headedness and headaches  All other systems reviewed and are negative            Scheduled Meds:  Current Facility-Administered Medications   Medication Dose Route Frequency Provider Last Rate    acetaminophen  650 mg Oral Q6H PRN Debbe Marinas, PA-C      amLODIPine  10 mg Oral Daily Brebe Yanet, PA-C      [START ON 6/29/2022] aspirin  81 mg Oral Daily Debbe Marinas, PA-C      atorvastatin  40 mg Oral Daily With [employer], PA-C      heparin (porcine)  3-20 Units/kg/hr (Order-Specific) Intravenous Titrated Debbe Marinas, PA-C 12 Units/kg/hr (06/28/22 0751)    heparin (porcine)  2,000 Units Intravenous Q1H PRN Debbe Marinas, PA-C      heparin (porcine)  4,000 Units Intravenous Once Debbe Marinas, PA-C      heparin (porcine)  4,000 Units Intravenous Q1H PRN Debbe Marinas, PA-C      lisinopril  20 mg Oral Daily Debbe Marinas, PA-C      metoprolol tartrate  12 5 mg Oral Q12H [facility] Joan June PA-C       Continuous Infusions:heparin (porcine), 3-20 Units/kg/hr (Order-Specific), Last Rate: 12 Units/kg/hr (06/28/22 0751)      PRN Meds:   acetaminophen    heparin (porcine)    heparin (porcine)  all current active meds have been reviewed and current meds:   Current Facility-Administered Medications   Medication Dose Route Frequency    acetaminophen (TYLENOL) tablet 650 mg  650 mg Oral Q6H PRN    amLODIPine (NORVASC) tablet 10 mg  10 mg Oral Daily    [START ON 6/29/2022] aspirin chewable tablet 81 mg  81 mg Oral Daily    atorvastatin (LIPITOR) tablet 40 mg  40 mg Oral Daily With Dinner    heparin (porcine) 25,000 units in 0 45% NaCl 250 mL infusion (premix)  3-20 Units/kg/hr (Order-Specific) Intravenous Titrated    heparin (porcine) injection 2,000 Units  2,000 Units Intravenous Q1H PRN    heparin (porcine) injection 4,000 Units  4,000 Units Intravenous Once    heparin (porcine) injection 4,000 Units  4,000 Units Intravenous Q1H PRN    lisinopril (ZESTRIL) tablet 20 mg  20 mg Oral Daily    metoprolol tartrate (LOPRESSOR) partial tablet 12 5 mg  12 5 mg Oral Q12H [facility]       No Known Allergies    Objective   Vitals: Blood pressure 149/86, pulse 72, temperature 97 7 °F (36 5 °C), temperature source Oral, resp  rate 18, height 5' 10" (1 778 m), weight 79 1 kg (174 lb 6 1 oz), SpO2 94 %  , Body mass index is 25 02 kg/m² ,   Orthostatic Blood Pressures    Flowsheet Row Most Recent Value   Blood Pressure 149/86 filed at 06/28/2022 0703   Patient Position - Orthostatic VS Lying filed at 06/28/2022 0703          No intake or output data in the 24 hours ending 06/28/22 0837    Invasive Devices  Report    Peripheral Intravenous Line  Duration           Peripheral IV 06/28/22 Distal;Left;Upper;Ventral (anterior) Arm <1 day              Physical Exam:  Physical Exam  Vitals and nursing note reviewed  Constitutional:       General: He is not in acute distress  Appearance: He is not diaphoretic  HENT:      Head: Normocephalic and atraumatic  Mouth/Throat:      Mouth: Mucous membranes are moist    Eyes:      General: No scleral icterus  Cardiovascular:      Rate and Rhythm: Normal rate and regular rhythm  Pulses: Normal pulses  Heart sounds: Normal heart sounds  No murmur heard  Pulmonary:      Effort: Pulmonary effort is normal       Breath sounds: Normal breath sounds  No wheezing or rales     Abdominal:      Palpations: Abdomen is soft  Musculoskeletal:      Cervical back: Neck supple  Right lower leg: No edema  Left lower leg: No edema  Skin:     General: Skin is warm and dry  Capillary Refill: Capillary refill takes less than 2 seconds  Neurological:      General: No focal deficit present  Mental Status: He is alert and oriented to person, place, and time     Psychiatric:         Mood and Affect: Mood normal          Lab Results:   Recent Results (from the past 24 hour(s))   CBC and differential    Collection Time: 06/28/22  2:08 AM   Result Value Ref Range    WBC 10 57 (H) 4 31 - 10 16 Thousand/uL    RBC 5 20 3 88 - 5 62 Million/uL    Hemoglobin 15 3 12 0 - 17 0 g/dL    Hematocrit 46 7 36 5 - 49 3 %    MCV 90 82 - 98 fL    MCH 29 4 26 8 - 34 3 pg    MCHC 32 8 31 4 - 37 4 g/dL    RDW 15 9 (H) 11 6 - 15 1 %    MPV 11 2 8 9 - 12 7 fL    Platelets 067 260 - 752 Thousands/uL    nRBC 0 /100 WBCs    Neutrophils Relative 58 43 - 75 %    Immat GRANS % 0 0 - 2 %    Lymphocytes Relative 24 14 - 44 %    Monocytes Relative 13 (H) 4 - 12 %    Eosinophils Relative 4 0 - 6 %    Basophils Relative 1 0 - 1 %    Neutrophils Absolute 6 19 1 85 - 7 62 Thousands/µL    Immature Grans Absolute 0 04 0 00 - 0 20 Thousand/uL    Lymphocytes Absolute 2 53 0 60 - 4 47 Thousands/µL    Monocytes Absolute 1 33 (H) 0 17 - 1 22 Thousand/µL    Eosinophils Absolute 0 40 0 00 - 0 61 Thousand/µL    Basophils Absolute 0 08 0 00 - 0 10 Thousands/µL   Comprehensive metabolic panel    Collection Time: 06/28/22  2:08 AM   Result Value Ref Range    Sodium 138 135 - 147 mmol/L    Potassium 3 9 3 5 - 5 3 mmol/L    Chloride 105 96 - 108 mmol/L    CO2 25 21 - 32 mmol/L    ANION GAP 8 4 - 13 mmol/L    BUN 25 5 - 25 mg/dL    Creatinine 1 20 0 60 - 1 30 mg/dL    Glucose 145 (H) 65 - 140 mg/dL    Calcium 9 2 8 4 - 10 2 mg/dL    AST 17 13 - 39 U/L    ALT 14 7 - 52 U/L    Alkaline Phosphatase 87 34 - 104 U/L    Total Protein 6 7 6 4 - 8 4 g/dL    Albumin 4 0 3 5 - 5 0 g/dL    Total Bilirubin 0 14 (L) 0 20 - 1 00 mg/dL    eGFR 59 ml/min/1 73sq m   HS Troponin 0hr (reflex protocol)    Collection Time: 06/28/22  2:08 AM   Result Value Ref Range    hs TnI 0hr 41 "Refer to ACS Flowchart"- see link ng/L   HS Troponin I 2hr    Collection Time: 06/28/22  4:04 AM   Result Value Ref Range    hs TnI 2hr 110 (H) "Refer to ACS Flowchart"- see link ng/L    Delta 2hr hsTnI 69 (H) <20 ng/L   Lipid panel    Collection Time: 06/28/22  6:25 AM   Result Value Ref Range    Cholesterol 157 See Comment mg/dL    Triglycerides 94 See Comment mg/dL    HDL, Direct 34 (L) >=40 mg/dL    LDL Calculated 104 (H) 0 - 100 mg/dL    Non-HDL-Chol (CHOL-HDL) 123 mg/dl   HS Troponin I 4hr    Collection Time: 06/28/22  6:55 AM   Result Value Ref Range    hs TnI 4hr 452 (H) "Refer to ACS Flowchart"- see link ng/L    Delta 4hr hsTnI 411 (H) <20 ng/L   APTT    Collection Time: 06/28/22  7:46 AM   Result Value Ref Range    PTT 32 23 - 37 seconds   Protime-INR    Collection Time: 06/28/22  7:46 AM   Result Value Ref Range    Protime 12 6 11 6 - 14 5 seconds    INR 0 94 0 84 - 1 19     Imaging: I have personally reviewed pertinent reports  and I have personally reviewed pertinent films in PACS  Code Status:  Level 1 full code  [facility] Everywhere [facility] reviewed:  Yes    * Please Note: Fluency DirectDictation voice to text software may have been used in the creation of this document   **

## 2022-06-28 NOTE — ED ATTENDING ATTESTATION
6/28/2022  I, Verba Goltz, MD, saw and evaluated the patient  I have discussed the patient with the resident/non-physician practitioner and agree with the resident's/non-physician practitioner's findings, Plan of Care, and MDM as documented in the resident's/non-physician practitioner's note, except where noted  All available labs and Radiology studies were reviewed  I was present for key portions of any procedure(s) performed by the resident/non-physician practitioner and I was immediately available to provide assistance  At this point I agree with the current assessment done in the Emergency Department  I have conducted an independent evaluation of this patient a history and physical is as follows:    ED Course         Critical Care Time  Procedures    Patient presents with severe chest pain and sweats  Started spontaneously  Former smoker with history of PAD  No fevers or chills  Doubt PE  Does not typically visit a doctor  No focal neuro deficits  Symptoms have since resolved  MDM concern for cardiac, given story will admit for obs

## 2022-06-28 NOTE — ASSESSMENT & PLAN NOTE
· Presented with chest pain, diaphoresis  Pain resolved after about 1 hour  · R/o ACS  · CLAY score: 2  · EKG: NSR, T wave inversions III, aVR, aVL  · Troponin: 41  · CXR with no acute findings noted  · Continue to trend troponin  · Monitor on telemetry  · Obtain lipid panel  · Start aspirin 81 mg daily     · Consider outpatient vs inpatient stress test

## 2022-06-28 NOTE — ED PROVIDER NOTES
History  Chief Complaint   Patient presents with    Chest Pain     Pt to ED with c/o 10/10 chest pain described as tightness, pt states unable to "get comfortable to sleep" pt denies SOB, n/v     Mr Lamonte Silvestre is a 67 yom presenting after episode of chest pain  States that he had sudden onset severe, burning pressure in the left chest with radiation down the left arm and also into the right chest, onset just over an hour prior to arrival, accompanied by diaphoresis  Took 81 mg ASA at onset of pain, which continued for an hour before resolving, not currently present  Has history of HTN, peripheral artery disease, is current pack/day smoker for decades, also notes remote history of silent MI, does not follow with doctor regularly  Denies fever, chills, weakness, lightheadedness, dizziness, stroke-like symptoms, shortness of breath, abdominal pain, nausea, vomiting, peripheral edema, or unilateral calf pain  Has chronic right leg swelling arterial bypass, at baseline  Denies history of DVT or PE  No recent travel, no recent sick contacts  Prior to Admission Medications   Prescriptions Last Dose Informant Patient Reported? Taking? amLODIPine (NORVASC) 10 mg tablet   No No   Sig: Take 1 tablet (10 mg total) by mouth daily   lisinopril (ZESTRIL) 20 mg tablet   No No   Sig: Take 1 tablet (20 mg total) by mouth daily      Facility-Administered Medications: None       Past Medical History:   Diagnosis Date    Hypertension        Past Surgical History:   Procedure Laterality Date    ARTERY SURGERY  2018       History reviewed  No pertinent family history  I have reviewed and agree with the history as documented      E-Cigarette/Vaping     E-Cigarette/Vaping Substances     Social History     Tobacco Use    Smoking status: Heavy Tobacco Smoker     Packs/day: 0 50     Types: Cigarettes     Start date: 3/11/1975    Smokeless tobacco: Never Used   Substance Use Topics    Alcohol use: Yes     Comment: ocassionally    Drug use: Never        Review of Systems   Constitutional: Positive for diaphoresis  Negative for activity change, appetite change, chills, fatigue, fever and unexpected weight change  HENT: Negative  Negative for congestion, postnasal drip, rhinorrhea, sore throat, trouble swallowing and voice change  Eyes: Negative  Negative for visual disturbance  Respiratory: Negative  Negative for cough and shortness of breath  Cardiovascular: Positive for chest pain  Negative for palpitations and leg swelling  Chronic RLE swelling, at baseline  Gastrointestinal: Negative  Negative for abdominal distention, abdominal pain, constipation, diarrhea, nausea and vomiting  Endocrine: Negative  Genitourinary: Negative  Negative for decreased urine volume, difficulty urinating, dysuria and hematuria  Musculoskeletal: Negative  Negative for arthralgias, back pain, gait problem, myalgias and neck pain  Skin: Negative  Negative for color change, pallor, rash and wound  Allergic/Immunologic: Negative  Neurological: Negative  Negative for dizziness, tremors, syncope, facial asymmetry, speech difficulty, weakness, light-headedness, numbness and headaches  Hematological: Negative  Does not bruise/bleed easily  Psychiatric/Behavioral: Negative  Negative for confusion  All other systems reviewed and are negative        Physical Exam  ED Triage Vitals   Temperature Pulse Respirations Blood Pressure SpO2   06/28/22 0144 06/28/22 0141 06/28/22 0141 06/28/22 0141 06/28/22 0141   97 8 °F (36 6 °C) 85 18 161/81 96 %      Temp Source Heart Rate Source Patient Position - Orthostatic VS BP Location FiO2 (%)   06/28/22 0455 06/28/22 0455 06/28/22 0455 06/28/22 0455 --   Oral Monitor Lying Right arm       Pain Score       06/28/22 0141       10 - Worst Possible Pain             Orthostatic Vital Signs  Vitals:    06/28/22 0141 06/28/22 0200 06/28/22 0300 06/28/22 0455   BP: 161/81 147/71 154/72 168/80   Pulse: 85 78 74 78   Patient Position - Orthostatic VS:    Lying       Physical Exam  Vitals and nursing note reviewed  Constitutional:       General: He is not in acute distress  Appearance: Normal appearance  He is not ill-appearing or diaphoretic  HENT:      Head: Normocephalic and atraumatic  Right Ear: External ear normal       Left Ear: External ear normal       Nose: Nose normal       Mouth/Throat:      Mouth: Mucous membranes are moist       Pharynx: Oropharynx is clear  No oropharyngeal exudate or posterior oropharyngeal erythema  Eyes:      General: No scleral icterus  Extraocular Movements: Extraocular movements intact  Conjunctiva/sclera: Conjunctivae normal       Pupils: Pupils are equal, round, and reactive to light  Cardiovascular:      Rate and Rhythm: Normal rate and regular rhythm  Pulses:           Radial pulses are 2+ on the right side and 1+ on the left side  Dorsalis pedis pulses are 1+ on the right side and 1+ on the left side  Heart sounds: Normal heart sounds  No murmur heard  No friction rub  No gallop  Pulmonary:      Effort: Pulmonary effort is normal  No respiratory distress  Breath sounds: Normal breath sounds  No decreased breath sounds, wheezing, rhonchi or rales  Abdominal:      General: Abdomen is flat  Bowel sounds are normal  There is no distension  Palpations: Abdomen is soft  Tenderness: There is no abdominal tenderness  There is no right CVA tenderness, left CVA tenderness, guarding or rebound  Musculoskeletal:         General: Swelling present  Normal range of motion  Cervical back: Normal range of motion and neck supple  No rigidity or tenderness  Right lower leg: No edema  Left lower leg: No edema  Comments: Right lower extremity swelling, however no erythema, warmth, or tenderness to palpation  Lymphadenopathy:      Cervical: No cervical adenopathy     Skin:     General: Skin is warm and dry  Capillary Refill: Capillary refill takes less than 2 seconds  Coloration: Skin is not jaundiced or pale  Findings: No bruising or rash  Neurological:      General: No focal deficit present  Mental Status: He is alert and oriented to person, place, and time  Motor: No weakness  Psychiatric:         Mood and Affect: Mood normal          Behavior: Behavior normal          ED Medications  Medications   amLODIPine (NORVASC) tablet 10 mg (has no administration in time range)   lisinopril (ZESTRIL) tablet 20 mg (has no administration in time range)   enoxaparin (LOVENOX) subcutaneous injection 40 mg (has no administration in time range)   acetaminophen (TYLENOL) tablet 650 mg (has no administration in time range)   aspirin chewable tablet 81 mg (has no administration in time range)   aspirin chewable tablet 243 mg (243 mg Oral Given 6/28/22 0324)       Diagnostic Studies  Results Reviewed     Procedure Component Value Units Date/Time    HS Troponin I 2hr [[id number]] Collected: 06/28/22 0404    Lab Status:  In process Specimen: Blood from Arm, Left Updated: 06/28/22 0413    HS Troponin I 4hr [[id number]]     Lab Status: No result Specimen: Blood     HS Troponin 0hr (reflex protocol) [[id number]]  (Normal) Collected: 06/28/22 0208    Lab Status: Final result Specimen: Blood from Arm, Left Updated: 06/28/22 0252     hs TnI 0hr 41 ng/L     Comprehensive metabolic panel [[id number]]  (Abnormal) Collected: 06/28/22 0208    Lab Status: Final result Specimen: Blood from Arm, Left Updated: 06/28/22 0239     Sodium 138 mmol/L      Potassium 3 9 mmol/L      Chloride 105 mmol/L      CO2 25 mmol/L      ANION GAP 8 mmol/L      BUN 25 mg/dL      Creatinine 1 20 mg/dL      Glucose 145 mg/dL      Calcium 9 2 mg/dL      AST 17 U/L      ALT 14 U/L      Alkaline Phosphatase 87 U/L      Total Protein 6 7 g/dL      Albumin 4 0 g/dL      Total Bilirubin 0 14 mg/dL      eGFR 59 ml/min/1 73sq m Narrative:      National Kidney Disease Foundation guidelines for Chronic Kidney Disease (CKD):     Stage 1 with normal or high GFR (GFR > 90 mL/min/1 73 square meters)    Stage 2 Mild CKD (GFR = 60-89 mL/min/1 73 square meters)    Stage 3A Moderate CKD (GFR = 45-59 mL/min/1 73 square meters)    Stage 3B Moderate CKD (GFR = 30-44 mL/min/1 73 square meters)    Stage 4 Severe CKD (GFR = 15-29 mL/min/1 73 square meters)    Stage 5 End Stage CKD (GFR <15 mL/min/1 73 square meters)  Note: GFR calculation is accurate only with a steady state creatinine    CBC and differential [[id number]]  (Abnormal) Collected: 06/28/22 0208    Lab Status: Final result Specimen: Blood from Arm, Left Updated: 06/28/22 0216     WBC 10 57 Thousand/uL      RBC 5 20 Million/uL      Hemoglobin 15 3 g/dL      Hematocrit 46 7 %      MCV 90 fL      MCH 29 4 pg      MCHC 32 8 g/dL      RDW 15 9 %      MPV 11 2 fL      Platelets 667 Thousands/uL      nRBC 0 /100 WBCs      Neutrophils Relative 58 %      Immat GRANS % 0 %      Lymphocytes Relative 24 %      Monocytes Relative 13 %      Eosinophils Relative 4 %      Basophils Relative 1 %      Neutrophils Absolute 6 19 Thousands/µL      Immature Grans Absolute 0 04 Thousand/uL      Lymphocytes Absolute 2 53 Thousands/µL      Monocytes Absolute 1 33 Thousand/µL      Eosinophils Absolute 0 40 Thousand/µL      Basophils Absolute 0 08 Thousands/µL                  XR chest 1 view portable    (Results Pending)         Procedures  ECG 12 Lead Documentation Only    Date/Time: 6/28/2022 1:42 AM  Performed by: Cari Boone DO  Authorized by: Cari Boone DO     Indications / Diagnosis:  Chest pain  ECG reviewed by me, the ED Provider: yes    Patient location:  ED  Previous ECG:     Previous ECG:  Compared to current    Comparison ECG info:  4/02/2022    Similarity:  Changes noted    Comparison to cardiac monitor: Yes    Interpretation:     Interpretation: abnormal    Rate:     ECG rate:  88    ECG rate assessment: normal    Rhythm:     Rhythm: sinus rhythm    Ectopy:     Ectopy: none    QRS:     QRS axis:  Normal    QRS intervals:  Normal  Conduction:     Conduction: normal    ST segments:     ST segments:  Normal  T waves:     T waves: inverted      Inverted:  III, aVL and aVR          ED Course             HEART Risk Score    Flowsheet Row Most Recent Value   Heart Score Risk Calculator    History 2 Filed at: 06/28/2022 0257   ECG 1 Filed at: 06/28/2022 0257   Age 2 Filed at: 06/28/2022 0257   Risk Factors 2 Filed at: 06/28/2022 0257   Troponin 2 Filed at: 06/28/2022 0257   HEART Score 9 Filed at: 06/28/2022 0257                        CLAY Risk Score    Flowsheet Row Most Recent Value   Age >= 72 1 Filed at: 06/28/2022 0450   Known CAD (stenosis >= 50%) 0 Filed at: 06/28/2022 0450   Recent (<=24 hrs) Service Angina 0 Filed at: 06/28/2022 0450   ST Deviation >= 0 5 mm 0 Filed at: 06/28/2022 0450   3+ CAD Risk Factors (FHx, HTN, HLP, DM, Smoker) 0 Filed at: 06/28/2022 0450   Aspirin Use Past 7 Days 1 Filed at: 06/28/2022 0450   Elevated Cardiac Markers 0 Filed at: 06/28/2022 0450   CLAY Risk Score (Calculated) 2 Filed at: 06/28/2022 0450              MDM  Number of Diagnoses or Management Options  Chest pain, unspecified type  Diagnosis management comments: Patient is a 60-year-old male presenting after 1 hour episode of chest pain accompanied by diaphoresis  Completely resolved at this time  Patient is well-appearing, conversational, in no apparent distress, vital signs stable with mild hypertension, 160s over 80s  Physical exam as above  No evidence of DVT on exam   CBC and CMP relatively unremarkable  Initial troponin 41, pending repeat  EKG sinus rhythm, rate 88, new T-wave inversions in III  No ST elevations or depressions noted  Heart score 9  Given additional 3 baby aspirin  Will admit to medicine for further cardiac workup  Stable, asymptomatic on admission         Amount and/or Complexity of Data Reviewed  Clinical lab tests: ordered and reviewed  Tests in the radiology section of CPT®: reviewed and ordered  Review and summarize past medical records: yes  Discuss the patient with other providers: yes  Independent visualization of images, tracings, or specimens: yes        Disposition  Final diagnoses:   Chest pain, unspecified type     Time reflects when diagnosis was documented in both MDM as applicable and the Disposition within this note     Time User Action Codes Description Comment    6/28/2022  3:50 AM Franklyn Roberts Add [R07 9] Chest pain, unspecified type       ED Disposition     ED Disposition   Admit    Condition   Stable    Date/Time   Tue Jun 28, 2022  3:42 AM    Comment   Case was discussed with ANGELINE and the patient's admission status was agreed to be Admission Status: observation status to the service of Dr David Morrison   Follow-up Information    None         Current Discharge Medication List      CONTINUE these medications which have NOT CHANGED    Details   amLODIPine (NORVASC) 10 mg tablet Take 1 tablet (10 mg total) by mouth daily  Qty: 90 tablet, Refills: 0    Associated Diagnoses: Primary hypertension      lisinopril (ZESTRIL) 20 mg tablet Take 1 tablet (20 mg total) by mouth daily  Qty: 90 tablet, Refills: 0    Associated Diagnoses: Primary hypertension           No discharge procedures on file  PDMP Review     None           ED Provider  Attending physically available and evaluated Elias Israel I managed the patient along with the ED Attending      Electronically Signed by         Krys Aguilar DO  06/28/22 7579

## 2022-06-28 NOTE — H&P
Tamera 13 1950, 67 y o  male MRN: [id number]  Unit/Bed#: S -01 Encounter: [id number]  Primary Care Provider: Ervin Sanders MD   Date and time admitted to hospital: 6/28/2022  1:37 AM    * Chest pain  Assessment & Plan  · Presented with chest pain, diaphoresis  Pain resolved after about 1 hour  · R/o ACS  · CLAY score: 2  · EKG: NSR, T wave inversions III, aVR, aVL  · Troponin: 41  · CXR with no acute findings noted  · Continue to trend troponin  · Monitor on telemetry  · Obtain lipid panel  · Start aspirin 81 mg daily  · Consider outpatient vs inpatient stress test        Tobacco abuse  Assessment & Plan  · Smoking cessation  · Offered nicotine patch, patient declined  Primary hypertension  Assessment & Plan  · BP elevated on presentation with most recent /80  · Continue amlodipine and lisinopril  PAD (peripheral artery disease) ([facility] [address] )  Assessment & Plan  · S/p R femoral-popliteal bypass 11/2017  · Not on daily aspirin or statin as an outpatient  VTE Pharmacologic Prophylaxis: VTE Score: 3 Moderate Risk (Score 3-4) - Pharmacological DVT Prophylaxis Ordered: enoxaparin (Lovenox)  Code Status: Full Code  Discussion with family: not at this time  Anticipated Length of Stay: Patient will be admitted on an observation basis with an anticipated length of stay of less than 2 midnights secondary to chest pain  Total Time for Visit, including Counseling / Coordination of Care: 70 minutes Greater than 50% of this total time spent on direct patient counseling and coordination of care  Chief Complaint: chest pain     History of Present Illness:  Sirisha Parr is a 67 y o  male with a PMH of PAD, HTN and tobacco abuse who presents with chest pain  Patient reports that around 11:30 pm last night he began having pain in his chest which radiated across both sides of his chest and into both his arms   He admits to diaphoresis with onset of the pain but denies SOB, n/v, lightheadedness or dizziness  He notes that prior to onset of his pain he had a snack which was melon that he notes was cold  He denies ever having had similar pain in the past  He reports taking 1 aspirin at home after onset of the pain and reports that his pain resolved after about 1 hour  Review of Systems:  Review of Systems   Constitutional: Positive for diaphoresis  Negative for chills and fever  Respiratory: Negative for shortness of breath  Cardiovascular: Positive for chest pain  Gastrointestinal: Negative for nausea and vomiting  Neurological: Negative for dizziness and light-headedness  All other systems reviewed and are negative  Past Medical and Surgical History:   Past Medical History:   Diagnosis Date    Hypertension        Past Surgical History:   Procedure Laterality Date    ARTERY SURGERY  2018       Meds/Allergies:  Prior to Admission medications    Medication Sig Start Date End Date Taking? Authorizing Provider   amLODIPine (NORVASC) 10 mg tablet Take 1 tablet (10 mg total) by mouth daily 3/10/22   Mitchell Agarwal MD   lisinopril (ZESTRIL) 20 mg tablet Take 1 tablet (20 mg total) by mouth daily 3/10/22   Joseluis Clark MD     I have reviewed home medications with patient personally  Allergies: No Known Allergies    Social History:  Marital Status: Single   Occupation:   Patient Pre-hospital Living Situation: Home  Patient Pre-hospital Level of Mobility: walks  Patient Pre-hospital Diet Restrictions:   Substance Use History:   Social History     Substance and Sexual Activity   Alcohol Use Yes    Comment: ocassionally     Social History     Tobacco Use   Smoking Status Heavy Tobacco Smoker    Packs/day: 0 50    Types: Cigarettes    Start date: 3/11/1975   Smokeless Tobacco Never Used     Social History     Substance and Sexual Activity   Drug Use Never       Family History:  History reviewed   No pertinent family history  Physical Exam:     Vitals:   Blood Pressure: 168/80 (06/28/22 0455)  Pulse: 78 (06/28/22 0455)  Temperature: 98 1 °F (36 7 °C) (06/28/22 0455)  Temp Source: Oral (06/28/22 0455)  Respirations: 18 (06/28/22 0455)  Height: 5' 10" (177 8 cm) (06/28/22 0455)  Weight - Scale: 79 1 kg (174 lb 6 1 oz) (06/28/22 0455)  SpO2: 96 % (06/28/22 0455)    Physical Exam  Vitals and nursing note reviewed  Constitutional:       General: He is not in acute distress  Appearance: He is not diaphoretic  HENT:      Head: Normocephalic and atraumatic  Eyes:      Conjunctiva/sclera: Conjunctivae normal    Cardiovascular:      Rate and Rhythm: Normal rate and regular rhythm  Pulmonary:      Effort: Pulmonary effort is normal  No respiratory distress  Breath sounds: Normal breath sounds  Abdominal:      General: Bowel sounds are normal       Palpations: Abdomen is soft  Tenderness: There is no abdominal tenderness  Musculoskeletal:      Right lower leg: Edema (mild; reports chronic since bypass) present  Left lower leg: No edema  Skin:     General: Skin is warm and dry  Coloration: Skin is not pale  Neurological:      Mental Status: He is alert and oriented to person, place, and time     Psychiatric:         Mood and Affect: Mood normal        Additional Data:     Lab Results:  Results from last 7 days   Lab Units 06/28/22  0208   WBC Thousand/uL 10 57*   HEMOGLOBIN g/dL 15 3   HEMATOCRIT % 46 7   PLATELETS Thousands/uL 258   NEUTROS PCT % 58   LYMPHS PCT % 24   MONOS PCT % 13*   EOS PCT % 4     Results from last 7 days   Lab Units 06/28/22  0208   SODIUM mmol/L 138   POTASSIUM mmol/L 3 9   CHLORIDE mmol/L 105   CO2 mmol/L 25   BUN mg/dL 25   CREATININE mg/dL 1 20   ANION GAP mmol/L 8   CALCIUM mg/dL 9 2   ALBUMIN g/dL 4 0   TOTAL BILIRUBIN mg/dL 0 14*   ALK PHOS U/L 87   ALT U/L 14   AST U/L 17   GLUCOSE RANDOM mg/dL 145*                       Imaging: Personally reviewed the following imaging: chest xray  XR chest 1 view portable    (Results Pending)       EKG and Other Studies Reviewed on Admission:   · EKG: NSR  HR 88  Inverted T waves  ** Please Note: This note has been constructed using a voice recognition system   **

## 2022-06-28 NOTE — ASSESSMENT & PLAN NOTE
· BP elevated on arrival in ED (161/81), now 133/78  · Discharge patient on: amlodipine 10 mg qd, lisinopril 20 mg qd, metoprolol tartrate 12 5 mg q12h

## 2022-06-28 NOTE — PROGRESS NOTES
[facility]  coding opportunities       Chart reviewed, no opportunity found:   [address]        Patients Insurance     Medicare Insurance: [employer] Ad[employer]age

## 2022-06-29 VITALS
BODY MASS INDEX: 24.91 KG/M2 | OXYGEN SATURATION: 94 % | HEART RATE: 69 BPM | RESPIRATION RATE: 16 BRPM | SYSTOLIC BLOOD PRESSURE: 133 MMHG | DIASTOLIC BLOOD PRESSURE: 78 MMHG | TEMPERATURE: 98.5 F | HEIGHT: 70 IN | WEIGHT: 174 LBS

## 2022-06-29 PROBLEM — I21.4 NSTEMI (NON-ST ELEVATED MYOCARDIAL INFARCTION) (HCC): Status: ACTIVE | Noted: 2022-06-28

## 2022-06-29 LAB
ANION GAP SERPL CALCULATED.3IONS-SCNC: 6 MMOL/L (ref 4–13)
BASOPHILS # BLD AUTO: 0.08 THOUSANDS/ΜL (ref 0–0.1)
BASOPHILS NFR BLD AUTO: 1 % (ref 0–1)
BUN SERPL-MCNC: 17 MG/DL (ref 5–25)
CALCIUM SERPL-MCNC: 8.5 MG/DL (ref 8.4–10.2)
CHLORIDE SERPL-SCNC: 107 MMOL/L (ref 96–108)
CO2 SERPL-SCNC: 25 MMOL/L (ref 21–32)
CREAT SERPL-MCNC: 0.96 MG/DL (ref 0.6–1.3)
EOSINOPHIL # BLD AUTO: 0.34 THOUSAND/ΜL (ref 0–0.61)
EOSINOPHIL NFR BLD AUTO: 3 % (ref 0–6)
ERYTHROCYTE [DISTWIDTH] IN BLOOD BY AUTOMATED COUNT: 15.7 % (ref 11.6–15.1)
GFR SERPL CREATININE-BSD FRML MDRD: 78 ML/MIN/1.73SQ M
GLUCOSE SERPL-MCNC: 96 MG/DL (ref 65–140)
HCT VFR BLD AUTO: 43.9 % (ref 36.5–49.3)
HGB BLD-MCNC: 14.3 G/DL (ref 12–17)
IMM GRANULOCYTES # BLD AUTO: 0.05 THOUSAND/UL (ref 0–0.2)
IMM GRANULOCYTES NFR BLD AUTO: 0 % (ref 0–2)
LYMPHOCYTES # BLD AUTO: 2.39 THOUSANDS/ΜL (ref 0.6–4.47)
LYMPHOCYTES NFR BLD AUTO: 21 % (ref 14–44)
MAGNESIUM SERPL-MCNC: 1.8 MG/DL (ref 1.9–2.7)
MCH RBC QN AUTO: 28.8 PG (ref 26.8–34.3)
MCHC RBC AUTO-ENTMCNC: 32.6 G/DL (ref 31.4–37.4)
MCV RBC AUTO: 89 FL (ref 82–98)
MONOCYTES # BLD AUTO: 1.42 THOUSAND/ΜL (ref 0.17–1.22)
MONOCYTES NFR BLD AUTO: 13 % (ref 4–12)
NEUTROPHILS # BLD AUTO: 6.99 THOUSANDS/ΜL (ref 1.85–7.62)
NEUTS SEG NFR BLD AUTO: 62 % (ref 43–75)
NRBC BLD AUTO-RTO: 0 /100 WBCS
PLATELET # BLD AUTO: 232 THOUSANDS/UL (ref 149–390)
PMV BLD AUTO: 11 FL (ref 8.9–12.7)
POTASSIUM SERPL-SCNC: 3.9 MMOL/L (ref 3.5–5.3)
RBC # BLD AUTO: 4.96 MILLION/UL (ref 3.88–5.62)
SODIUM SERPL-SCNC: 138 MMOL/L (ref 135–147)
WBC # BLD AUTO: 11.27 THOUSAND/UL (ref 4.31–10.16)

## 2022-06-29 PROCEDURE — 85025 COMPLETE CBC W/AUTO DIFF WBC: CPT | Performed by: INTERNAL MEDICINE

## 2022-06-29 PROCEDURE — 99214 OFFICE O/P EST MOD 30 MIN: CPT | Performed by: INTERNAL MEDICINE

## 2022-06-29 PROCEDURE — 80048 BASIC METABOLIC PNL TOTAL CA: CPT | Performed by: INTERNAL MEDICINE

## 2022-06-29 PROCEDURE — 99217 PR OBSERVATION CARE DISCHARGE MANAGEMENT: CPT | Performed by: HOSPITALIST

## 2022-06-29 PROCEDURE — 83735 ASSAY OF MAGNESIUM: CPT | Performed by: NURSE PRACTITIONER

## 2022-06-29 RX ORDER — ASPIRIN 81 MG/1
81 TABLET, CHEWABLE ORAL DAILY
Qty: 30 TABLET | Refills: 0 | Status: SHIPPED | OUTPATIENT
Start: 2022-06-30 | End: 2022-07-28

## 2022-06-29 RX ORDER — LISINOPRIL 20 MG/1
20 TABLET ORAL DAILY
Status: DISCONTINUED | OUTPATIENT
Start: 2022-06-29 | End: 2022-06-29 | Stop reason: HOSPADM

## 2022-06-29 RX ORDER — ATORVASTATIN CALCIUM 40 MG/1
40 TABLET, FILM COATED ORAL
Qty: 30 TABLET | Refills: 0 | Status: SHIPPED | OUTPATIENT
Start: 2022-06-29 | End: 2022-07-18 | Stop reason: SDUPTHER

## 2022-06-29 RX ADMIN — AMLODIPINE BESYLATE 10 MG: 10 TABLET ORAL at 08:14

## 2022-06-29 RX ADMIN — ASPIRIN 81 MG CHEWABLE TABLET 81 MG: 81 TABLET CHEWABLE at 08:14

## 2022-06-29 RX ADMIN — TICAGRELOR 90 MG: 90 TABLET ORAL at 04:54

## 2022-06-29 RX ADMIN — LISINOPRIL 20 MG: 20 TABLET ORAL at 09:51

## 2022-06-29 RX ADMIN — METOPROLOL TARTRATE 25 MG: 25 TABLET, FILM COATED ORAL at 08:14

## 2022-06-29 NOTE — DISCHARGE SUMMARY
[facility]  Discharge- Martin Hodges 1950, 67 y o  male MRN: [id number]  Unit/Bed#: S -01 Encounter: [id number]  Primary Care Provider: Julieta Ovalles MD   Date and time admitted to hospital: 6/28/2022  1:37 AM    * NSTEMI (non-ST elevated myocardial infarction) [facility])  Assessment & Plan  · Patient presented with transient chest pain and diaphoresis which resolved after about 1 hour  · EKG: NSR, T wave inversions III, aVR, aVL  · Serial troponins: 0h - nml (41), 2h - elevated (110), 4h - elevated (452)  · CXR: no acute findings noted   · Echo: Global longitudinal strain is reduced at -15%  Diastolic function is mildly abnormal, consistent with grade I (abnormal) relaxation  Hypokinesis in: basal inferior, basal inferolateral, mid inferior and mid inferolateral segments    · Cardiac catheterization performed yesterday which showed: 50% LAD stenosis, 70% LCx stenosis, 70% mid-RCA stenosis, 90% distal RCA stenosis; angioplasty/stent placement were performed on the LCx, mid-RCA, and distal-RCA which each showed CLAY flow 3 perfusion and 0% residual stenosis post-intervention  · Patient appears well post-procedure with stable vital signs and unremarkable labs    Plan:  · Discharge patient with: ASA 81 mg qd, Atorvastatin 40 mg qd, Brilinta 90 mg q12h, Metoprolol Tartrate 12 5 mg q12h  · Follow-up with cardiology on discharge  · Repeat BMP in 3 days  · Outpatient cardiac rehab    Primary hypertension  Assessment & Plan  · BP elevated on arrival in ED (161/81), now 133/78  · Discharge patient on: amlodipine 10 mg qd, lisinopril 20 mg qd, metoprolol tartrate 12 5 mg q12h    PAD (peripheral artery disease) ([facility])  Assessment & Plan  · S/p R femoral-popliteal bypass 11/2017  · Lipid profile 6/28/2022 - cholesterol 157, triglycerides 94, HDL 34, and   · Discharge on Atorvastatin 40 mg qd (patient was not previously on a statin)    Tobacco abuse  Assessment & Plan  · Patient has 25 pack year smoking history  · Nicotine patch ordered      Medical Problems             Resolved Problems  Date Reviewed: 6/28/2022   None               Discharging Resident: Joan Murry  Discharging Attending: Clay Zaidi MD  PCP: Umair Andrew MD  Admission Date:   Admission Orders (From admission, onward)     Ordered        06/28/22 0343  Place in Observation  Once                      Discharge Date: 06/29/22    Consultations During Hospital Stay:  · Cardiology    Procedures Performed:   · Cardiac catheterization    Significant Findings / Test Results:   · Elevated troponins (41, 110, 452)  · EKG: NSR with T-wave inversions on III, aVL, aVR  · Cardiac catheterization: 50% stenosis LAD, 70% stenosis LCx, 70% stenosis mid-RCA, 90% stenosis distal-RCA    Incidental Findings:   · N/A    Test Results Pending at Discharge (will require follow up):  · N/A     Outpatient Tests Requested:  · BMP in 3 days    Complications:  N/A    Reason for Admission: Chest pain    Hospital Course:   Prakash Steinberg is a 67 y o  male patient who originally presented to the hospital on 6/28/2022 due to transient chest pain and diaphoresis  In the ED, patient was found to have elevated troponins and EKG showed T wave inversions in III, aVL, and aVR  Cardiac catheterization was performed which showed 50% stenosis in the LAD, 70% stenosis in the LCx, 70% stenosis in the mid-RCA, and 90% stenosis in the distal-RCA  Stenting/angioplasty was performed in the LCx, mid-RCA, and distal-RCA  Post-intervention, each vessel showed CLAY flow 3 and 0% residual stenosis  Patient felt well post-operatively with no returned chest pain, so it was determined that he was safe for discharge  Please see above list of diagnoses and related plan for additional information  Condition at Discharge: good    Discharge Day Visit / Exam:   Subjective:  Patient states that he is feeling well today   He denies any returned chest pain since the one transient episode two nights ago  He endorsed some mild intermittent shortness of breath post-operatively, but he denies any SOB during my encounter with him  He additionally denies any nausea, vomiting, diarrhea, abdominal pain, or fevers  Vitals: Blood Pressure: 133/78 (06/29/22 0732)  Pulse: 69 (06/29/22 0732)  Temperature: 98 5 °F (36 9 °C) (06/29/22 0732)  Temp Source: Oral (06/28/22 2330)  Respirations: 16 (06/29/22 0732)  Height: 5' 10" (177 8 cm) (06/28/22 0900)  Weight - Scale: 78 9 kg (174 lb) (06/28/22 0900)  SpO2: 94 % (06/29/22 0732)  Exam:   Physical Exam  Vitals and nursing note reviewed  Constitutional:       Appearance: He is well-developed  HENT:      Head: Normocephalic and atraumatic  Cardiovascular:      Rate and Rhythm: Normal rate and regular rhythm  Heart sounds: No murmur heard  Pulmonary:      Effort: Pulmonary effort is normal  No respiratory distress  Breath sounds: Normal breath sounds  No wheezing or rales  Abdominal:      General: Abdomen is flat  Bowel sounds are normal       Palpations: Abdomen is soft  Tenderness: There is no abdominal tenderness  Skin:     General: Skin is warm and dry  Neurological:      Mental Status: He is alert  Discussion with Family: Patient declined call to   Discharge instructions/Information to patient and family:   See after visit summary for information provided to patient and family  Provisions for Follow-Up Care:  See after visit summary for information related to follow-up care and any pertinent home health orders  Disposition:   Home    Planned Readmission: N/A    Discharge Medications:  See after visit summary for reconciled discharge medications provided to patient and/or family        **Please Note: This note may have been constructed using a voice recognition system    Nutrition Assessment and Intervention:     Reviewed food recall journal      Physical Activity Assessment and Intervention:    Activity journal reviewed      Emotional and Mental Well-being, Sleep, Connectedness Assessment and Intervention:    Sleep/stress assessment performed      Tobacco and Toxic Substance Assessment and Intervention:     Tobacco use screening performed    Alcohol and drug use screening performed

## 2022-06-29 NOTE — PROGRESS NOTES
General Cardiology   Progress Note -  Team One   Martin Hodges 67 y o  male MRN: [id number]    Unit/Bed#: S -01 Encounter: [id number]    Assessment  1  NSTEMI   -Currently w/o c/o CP  -HS troponin levels; 41 -- 110 -- 452   -ECG on admission 6/27 - NSR, ST T-wave abnormalities noted in the inferior/lateral leads   -On aspirin, statin, and BB   2  CAD s/p PCI/ALLEN x 3 (mid LCX, mid RCA, and distal RCA)  -[facility] 6/28/2022 - pLAD 50% stenosis, not flow-limiting by iFR (iFR = 0 92), mid LCX 70% stenosis, mid RCA 70% stenosis, and distal RCA 90% stenosis  -On DAPT w/aspirin 81 mg daily, and Brilinta 90 mg q 12 hours   -On high-intensity statin, and BB  3  Preserved LV systolic function  -TTE 1/28/7563 - LVEF 60%, grade 1 DD, mild LVH, hypokinetic basal inferior, basal inferior lateral, mid inferior, and mid inferior lateral wall, LA mildly dilated, mild-to-moderate AI, mild MR   4  PAD s/p prior R femoral-popliteal bypass (11/2017)  -On aspirin, and high-intensity statin  5  Essential hypertension  -Average /77, last recorded 133/78, HR 69    -Outpatient BP regimen; amlodipine 10 mg daily, and lisinopril 20 mg daily  -Inpatient BP regimen; amlodipine 10 mg daily, and metoprolol tartrate 12 5 mg q 12 hours  6  Dyslipidemia  -Lipid profile 6/28/2022 - cholesterol 157, triglycerides 94, HDL 34, and   -Previously on no statin, started on atorvastatin 40 mg daily this admission  7  Nicotine dependence  -Smoking cessation  -Nicotine patch ordered      Plan  -Doing well post [facility] procedure  No specific cardiac complaints   -Continue DAPT w/ aspirin 81 mg daily and Brilinta 90 mg q 12 hours (uninterrupted x1 year) - price checked w/[facility] pharmacy after providing  coupon, 1st month will be free and going forward will be $100/month - pt agreeable to cost    -Continue high-intensity statin and BB at current doses    -BP mildly elevated this a m , continue amlodipine 10 mg daily, restart lisinopril 20 mg daily, and continue metoprolol tartrate 25 mg q 12 hours   -BMP reviewed from this a m , electrolytes and renal function are stable  -Will arrange follow-up with cardiology on discharge  BMP in 3 days, and a referral for cardiac rehab will be ordered  Subjective  Review of Systems   Constitutional: Negative for chills, fever and malaise/fatigue  Eyes: Negative for visual disturbance  Cardiovascular: Negative for chest pain, dyspnea on exertion, leg swelling, orthopnea and palpitations  Respiratory: Negative for cough and shortness of breath  Gastrointestinal: Negative for abdominal pain  Neurological: Negative for dizziness, headaches and light-headedness  Objective:   Physical Exam  Vitals and nursing note reviewed  Constitutional:       General: He is not in acute distress  Appearance: He is not diaphoretic  HENT:      Head: Normocephalic and atraumatic  Mouth/Throat:      Mouth: Mucous membranes are moist    Eyes:      General: No scleral icterus  Cardiovascular:      Rate and Rhythm: Normal rate and regular rhythm  Pulses: Normal pulses  Heart sounds: Normal heart sounds  No murmur heard  Pulmonary:      Effort: Pulmonary effort is normal       Breath sounds: Normal breath sounds  No wheezing or rales  Abdominal:      Palpations: Abdomen is soft  Musculoskeletal:      Cervical back: Neck supple  Right lower leg: No edema  Left lower leg: No edema  Skin:     General: Skin is warm and dry  Capillary Refill: Capillary refill takes less than 2 seconds  Comments: Right radial cardiac catheterization site, surrounding area with ecchymosis but soft, neurovascular exam of the right hand/wrist is within normal limits  Neurological:      General: No focal deficit present  Mental Status: He is alert and oriented to person, place, and time     Psychiatric:         Mood and Affect: Mood normal          Vitals: Blood pressure 133/78, pulse 69, temperature 98 5 °F (36 9 °C), resp  rate 16, height 5' 10" (1 778 m), weight 78 9 kg (174 lb), SpO2 94 %  ,     Body mass index is 24 97 kg/m²  ,   Systolic (63RMU), OGW:661 , Min:133 , RXK:633     Diastolic (38ANJ), CSX:27, Min:66, Max:87      Intake/Output Summary (Last 24 hours) at 6/29/2022 0905  Last data filed at 6/28/2022 2134  Gross per 24 hour   Intake 1000 ml   Output 0 ml   Net 1000 ml     Weight (last 2 days)     Date/Time Weight    06/28/22 0900 78 9 (174)    06/28/22 04:55:04 79 1 (174 38)    06/28/22 0141 78 5 (173)          LABORATORY RESULTS      CBC with diff:   Results from last 7 days   Lab Units 06/29/22  0455 06/28/22  0208   WBC Thousand/uL 11 27* 10 57*   HEMOGLOBIN g/dL 14 3 15 3   HEMATOCRIT % 43 9 46 7   MCV fL 89 90   PLATELETS Thousands/uL 232 258   MCH pg 28 8 29 4   MCHC g/dL 32 6 32 8   RDW % 15 7* 15 9*   MPV fL 11 0 11 2   NRBC AUTO /100 WBCs 0 0       CMP:  Results from last 7 days   Lab Units 06/29/22  0455 06/28/22  0208   POTASSIUM mmol/L 3 9 3 9   CHLORIDE mmol/L 107 105   CO2 mmol/L 25 25   BUN mg/dL 17 25   CREATININE mg/dL 0 96 1 20   CALCIUM mg/dL 8 5 9 2   AST U/L  --  17   ALT U/L  --  14   ALK PHOS U/L  --  87   EGFR ml/min/1 73sq m 78 59       BMP:  Results from last 7 days   Lab Units 06/29/22  0455 06/28/22  0208   POTASSIUM mmol/L 3 9 3 9   CHLORIDE mmol/L 107 105   CO2 mmol/L 25 25   BUN mg/dL 17 25   CREATININE mg/dL 0 96 1 20   CALCIUM mg/dL 8 5 9 2       No results found for: NTBNP     Results from last 7 days   Lab Units 06/29/22  0455   MAGNESIUM mg/dL 1 8*                   Results from last 7 days   Lab Units 06/28/22  0746   INR  0 94       Lipid Profile:   No results found for: CHOL  Lab Results   Component Value Date    HDL 34 (L) 06/28/2022    HDL 41 03/14/2022     Lab Results   Component Value Date    LDLCALC 104 (H) 06/28/2022    LDLCALC 117 (H) 03/14/2022     Lab Results   Component Value Date    TRIG 94 06/28/2022    TRIG 132 03/14/2022       Cardiac testing:   No results found for this or any previous visit  No results found for this or any previous visit  No results found for this or any previous visit  No valid procedures specified  No results found for this or any previous visit  Meds/Allergies   all current active meds have been reviewed and current meds:   Current Facility-Administered Medications   Medication Dose Route Frequency    acetaminophen (TYLENOL) tablet 650 mg  650 mg Oral Q6H PRN    amLODIPine (NORVASC) tablet 10 mg  10 mg Oral Daily    aspirin chewable tablet 81 mg  81 mg Oral Daily    atorvastatin (LIPITOR) tablet 40 mg  40 mg Oral Daily With Dinner    metoprolol tartrate (LOPRESSOR) tablet 25 mg  25 mg Oral Q12H [address]    ticagrelor (BRILINTA) tablet 90 mg  90 mg Oral Q12H [address]        EKG personally reviewed by KORTNEY Garcia    Assessment:  Principal Problem:    Chest pain  Active Problems:    PAD (peripheral artery disease) ([address] )    Primary hypertension    Tobacco abuse    Counseling / Coordination of Care  Total floor / unit time spent today 20 minutes  Greater than 50% of total time was spent with the patient and / or family counseling and / or coordination of care  ** Please Note: Dragon 360 Dictation voice to text software may have been used in the creation of this document   **

## 2022-06-29 NOTE — DISCHARGE INSTR - AVS FIRST PAGE
Dear Mark Gomez,     It was our pleasure to care for you here at [facility]  It is our hope that we were always able to exceed the expected standards for your care during your stay  You were hospitalized due to ***  You were cared for on the *** floor by Ian Cade MD under the service of Vasu Matamoros MD with the [address] [place] [address] [facility] who covers for your primary care physician (PCP), Tiny Drummond MD, while you were hospitalized  If you have any questions or concerns related to this hospitalization, you may contact us at [phone]  For follow up as well as any medication refills, we recommend that you follow up with your primary care physician  A registered nurse will reach out to you by phone within a few days after your discharge to answer any additional questions that you may have after going home  However, at this time we provide for you here, the most important instructions / recommendations at discharge:     Notable Medication Adjustments -   Brillinta tablets of 90 mg , 1 tablet ashleigh 12 hrs added to your home meds  Metoprolol 25 mg , 1 tablet every 12 hrs added to your home meds   Testing Required after Discharge -   Bmp ( basic metabolic panel) blood work , please make it effective in 3 days  Important follow up information -   Please follow as instructed below  Other Instructions -   Continue brillinta and metoprolol as stated above  Continue atorvastatin 40 mg daily and aspirin 81 mg daily  Continue the rest of your home meds  Order for cardiac rehab provided please make it effective  Continue follow up with cardiology  Continue follow up with pcp   Please review this entire after visit summary as additional general instructions including medication list, appointments, activity, diet, any pertinent wound care, and other additional recommendations from your care team that may be provided for you        Sincerely,     Ian Cade MD

## 2022-06-29 NOTE — UTILIZATION REVIEW
Initial Clinical Review    Admission: Date/Time/Statement:   Admission Orders (From admission, onward)     Ordered        06/28/22 0343  Place in Observation  Once                      Orders Placed This Encounter   Procedures    Place in Observation     Standing Status:   Standing     Number of Occurrences:   1     Order Specific Question:   Level of Care     Answer:   Med Surg [16]     ED Arrival Information     Expected   6/28/2022 01:28    Arrival   6/28/2022 01:29    Acuity   Emergent            Means of arrival   Walk-In    Escorted by   Self    Service   Hospitalist    Admission type   Emergency            Arrival complaint   chest pains           Chief Complaint   Patient presents with    Chest Pain     Pt to ED with c/o 10/10 chest pain described as tightness, pt states unable to "get comfortable to sleep" pt denies SOB, n/v       Initial Presentation: 67 y o  male with pmh of PAD, HTN and tobacco abuse presented to the ED from home with chest pain  Pt reports onset of CP at 11:30 last night  Chest pain radiated into both sides of the chest into both arms associated w/ diaphoresis  Took 1 ASA at home after the onset of cp and reports pain resolved after 1 hr  Reports he had a cold melon for midnight prior to cp  In the ED, CLAY score: 2  EKG: NSR, T wave inversions III, aVR, aVL  Troponin: 41  CXR with no acute findings noted  /80  Admit as observation level of care for  · Continue to trend troponin  · Monitor on telemetry  · Obtain lipid panel  · Start aspirin 81 mg daily     · Consider outpatient vs inpatient stress test    ·  Continue amlodipine and lisinopril          ED Triage Vitals   Temperature Pulse Respirations Blood Pressure SpO2   06/28/22 0144 06/28/22 0141 06/28/22 0141 06/28/22 0141 06/28/22 0141   97 8 °F (36 6 °C) 85 18 161/81 96 %      Temp Source Heart Rate Source Patient Position - Orthostatic VS BP Location FiO2 (%)   06/28/22 0455 06/28/22 0455 06/28/22 0455 06/28/22 9157 --   Oral Monitor Lying Right arm       Pain Score       06/28/22 0141       10 - Worst Possible Pain          Wt Readings from Last 1 Encounters:   06/28/22 78 9 kg (174 lb)     Additional Vital Signs:   Date/Time Temp Pulse Resp BP MAP (mmHg) SpO2 Calculated FIO2 (%) - Nasal Cannula Nasal Cannula O2 Flow Rate (L/min) O2 Device Patient Position - Orthostatic VS   06/29/22 07:32:52 98 5 °F (36 9 °C) 69 16 133/78 96 94 % -- -- -- --   06/28/22 2330 97 8 °F (36 6 °C) -- 16 150/80 -- -- -- -- -- Lying   06/28/22 21:34:18 98 4 °F (36 9 °C) 64 16 146/69 -- 96 % -- -- None (Room air) Lying   06/28/22 20:51:23 -- 60 -- 177/86 Abnormal  116 97 % -- -- -- --   06/28/22 2031 -- 60 -- 177/66 Abnormal  116 -- -- -- -- Lying   06/28/22 1931 97 6 °F (36 4 °C) 62 16 144/78 -- -- -- -- None (Room air) Lying   06/28/22 1831 97 8 °F (36 6 °C) -- 18 155/87 111 -- -- -- None (Room air) Lying   06/28/22 1731 97 8 °F (36 6 °C) -- 16 -- -- -- -- -- -- Lying   06/28/22 1715 97 8 °F (36 6 °C) -- 18 -- -- -- -- -- None (Room air) Lying   06/28/22 1645 97 5 °F (36 4 °C) -- 18 -- -- -- -- -- None (Room air) Lying   06/28/22 1615 -- -- -- -- -- -- -- -- None (Room air) --   06/28/22 16:07:31 -- 64 -- 148/80 103 97 % -- -- -- --   06/28/22 16:02:37 -- -- -- 138/75 96 -- -- -- -- --   06/28/22 1600 -- -- -- -- -- 99 % -- -- None (Room air) Lying   06/28/22 1545 -- -- -- 138/75 96 98 % -- -- None (Room air) Lying   06/28/22 1530 97 7 °F (36 5 °C) -- -- 145/73 98 98 % -- -- None (Room air) Lying   06/28/22 13:39:21 -- -- -- -- -- 99 % 28 2 L/min Nasal cannula --   06/28/22 0900 -- 72 -- 149/86 -- -- -- -- -- --   06/28/22 07:03:31 97 7 °F (36 5 °C) 72 18 149/86 107 94 % -- -- None (Room air) Lying   06/28/22 04:55:04 98 1 °F (36 7 °C) 78 18 168/80 109 96 % -- -- None (Room air) Lying   06/28/22 0439 -- -- -- -- -- -- -- -- None (Room air) --   06/28/22 0300 -- 74 18 154/72 104 92 % -- -- -- --   06/28/22 0200 -- 78 18 147/71 102 94 % -- -- -- --   06/28/22 0144 97 8 °F (36 6 °C) -- -- -- -- -- -- -- -- --       Pertinent Labs/Diagnostic Test Results:   XR chest 1 view portable   Final Result by Brad Hanson MD (06/28 0804)      No acute cardiopulmonary disease                    Workstation performed: [id number]               Results from last 7 days   Lab Units 06/29/22  0455 06/28/22  0208   WBC Thousand/uL 11 27* 10 57*   HEMOGLOBIN g/dL 14 3 15 3   HEMATOCRIT % 43 9 46 7   PLATELETS Thousands/uL 232 258   NEUTROS ABS Thousands/µL 6 99 6 19         Results from last 7 days   Lab Units 06/29/22  0455 06/28/22  0208   SODIUM mmol/L 138 138   POTASSIUM mmol/L 3 9 3 9   CHLORIDE mmol/L 107 105   CO2 mmol/L 25 25   ANION GAP mmol/L 6 8   BUN mg/dL 17 25   CREATININE mg/dL 0 96 1 20   EGFR ml/min/1 73sq m 78 59   CALCIUM mg/dL 8 5 9 2   MAGNESIUM mg/dL 1 8*  --      Results from last 7 days   Lab Units 06/28/22  0208   AST U/L 17   ALT U/L 14   ALK PHOS U/L 87   TOTAL PROTEIN g/dL 6 7   ALBUMIN g/dL 4 0   TOTAL BILIRUBIN mg/dL 0 14*         Results from last 7 days   Lab Units 06/29/22  0455 06/28/22  0208   GLUCOSE RANDOM mg/dL 96 145*             No results found for: BETA-HYDROXYBUTYRATE                   Results from last 7 days   Lab Units 06/28/22  0655 06/28/22  0404 06/28/22  0208   HS TNI 0HR ng/L  --   --  41   HS TNI 2HR ng/L  --  110*  --    HSTNI D2 ng/L  --  69*  --    HS TNI 4HR ng/L 452*  --   --    HSTNI D4 ng/L 411*  --   --          Results from last 7 days   Lab Units 06/28/22  1149 06/28/22  0746   PROTIME seconds  --  12 6   INR   --  0 94   PTT seconds 95* 32                                                                                                   ED Treatment:   Medication Administration from 06/28/2022 0128 to 06/28/2022 0423       Date/Time Order Dose Route Action Action by Comments     06/28/2022 0324 aspirin chewable tablet 324 mg 324 mg Oral Not Given Lizbeth [facility], [facility]      06/28/2022 6451 aspirin chewable tablet 243 mg 243 mg Oral Given Lizbeth Pena RN         Past Medical History:   Diagnosis Date    Hypertension      Present on Admission:   Primary hypertension   Tobacco abuse   [facility]peripheral artery disease) ([facility])      Admitting Diagnosis: Chest pain [R07 9]  Chest pain, unspecified type [R07 9]  Age/Sex: 67 y o  male  Admission Orders:  Scheduled Medications:  amLODIPine, 10 mg, Oral, Daily  aspirin, 81 mg, Oral, Daily  atorvastatin, 40 mg, Oral, Daily With Dinner  lisinopril, 20 mg, Oral, Daily  metoprolol tartrate, 25 mg, Oral, Q12H [facility]  ticagrelor, 90 mg, Oral, Q12H [facility]      Continuous IV Infusions:     PRN Meds:  acetaminophen, 650 mg, Oral, Q6H PRN        IP CONSULT TO CARDIOLOGY    Network Utilization Review Department  ATTENTION: Please call with any questions or concerns to [phone] and carefully listen to the prompts so that you are directed to the right person  All voicemails are confidential   Rosemarie Vergara all requests for admission clinical reviews, approved or denied determinations and any other requests to dedicated fax number below belonging to the campus where the patient is receiving treatment   List of dedicated fax numbers for the Facilities:  [address] DENIALS (Administrative/Medical Necessity) [phone]   [address] (Maternity/NICU/Pediatrics) [phone]   [address]  [phone]   [address] [facility] [place] [place] [address] [address] [place] [address] [address] [facility]  [address] [facility] [phone]

## 2022-06-29 NOTE — DISCHARGE INSTRUCTIONS
1  Please see the post angioplasty discharge instructions  No heavy lifting, greater than 10 lbs  or strenuous activity for 1 week  Follow angioplasty discharge instructions  2 Remove band aid tomorrow  Shower and wash area- wrist gently with soap and water- beginning tomorrow  Rinse and pat dry  Apply new water seal band aid  Repeat this process for 5 days  No powders, creams lotions or antibiotic ointments  for 5 days  No tub baths, hot tubs or swimming for 5 days  3  Call [facility] Office ([phone]) if you develop a fever, redness or drainage at your wrist access site  4  No driving for 2 days    5  Do not stop aspirin or Brilinta (Ticagrelor) any reason without a cardiologists consent, or the stent could block up and cause a heart attack  6  Stent card and book

## 2022-06-29 NOTE — NURSING NOTE
Pt cleared for discharge at this time  D/C instructions reviewed with and given to pt  Packet on stent information also reviewed and given to pt  All questions and concerns addressed at this time  R radial dressing changed, D/C/I  IV site and Masimo removed  Belongings gathered  Ly[employer] called by case management  Pt escorted to vehicle by RN

## 2022-06-30 ENCOUNTER — OFFICE VISIT (OUTPATIENT)
Dept: INTERNAL MEDICINE CLINIC | Facility: CLINIC | Age: 72
End: 2022-06-30
Payer: COMMERCIAL

## 2022-06-30 ENCOUNTER — TRANSITIONAL CARE MANAGEMENT (OUTPATIENT)
Dept: INTERNAL MEDICINE CLINIC | Facility: OTHER | Age: 72
End: 2022-06-30

## 2022-06-30 VITALS
HEIGHT: 70 IN | DIASTOLIC BLOOD PRESSURE: 78 MMHG | WEIGHT: 170 LBS | HEART RATE: 75 BPM | SYSTOLIC BLOOD PRESSURE: 140 MMHG | OXYGEN SATURATION: 98 % | BODY MASS INDEX: 24.34 KG/M2 | TEMPERATURE: 98.9 F

## 2022-06-30 DIAGNOSIS — Z76.89 ENCOUNTER FOR SUPPORT AND COORDINATION OF TRANSITION OF CARE: Primary | ICD-10-CM

## 2022-06-30 DIAGNOSIS — I10 PRIMARY HYPERTENSION: ICD-10-CM

## 2022-06-30 DIAGNOSIS — I21.4 NSTEMI (NON-ST ELEVATED MYOCARDIAL INFARCTION) (HCC): ICD-10-CM

## 2022-06-30 DIAGNOSIS — Z72.0 TOBACCO ABUSE: ICD-10-CM

## 2022-06-30 DIAGNOSIS — I73.9 PAD (PERIPHERAL ARTERY DISEASE) (HCC): ICD-10-CM

## 2022-06-30 DIAGNOSIS — Z12.11 SCREENING FOR COLON CANCER: ICD-10-CM

## 2022-06-30 PROCEDURE — 99496 TRANSJ CARE MGMT HIGH F2F 7D: CPT | Performed by: INTERNAL MEDICINE

## 2022-06-30 RX ORDER — LISINOPRIL 20 MG/1
TABLET ORAL
Qty: 90 TABLET | Refills: 0 | Status: SHIPPED | OUTPATIENT
Start: 2022-06-30 | End: 2022-07-28

## 2022-06-30 RX ORDER — AMLODIPINE BESYLATE 10 MG/1
10 TABLET ORAL DAILY
Qty: 90 TABLET | Refills: 0 | Status: SHIPPED | OUTPATIENT
Start: 2022-06-30

## 2022-06-30 NOTE — PROGRESS NOTES
Assessment/Plan:     No problem-specific Assessment & Plan notes found for this encounter  Diagnoses and all orders for this visit:    Encounter for support and coordination of transition of care    Screening for colon cancer  -     Ambulatory referral for colonoscopy; Future    Primary hypertension  Comments:  Stable continue current medications  Orders:  -     amLODIPine (NORVASC) 10 mg tablet; Take 1 tablet (10 mg total) by mouth daily    NSTEMI (non-ST elevated myocardial infarction) ([facility] [address] )  Comments:  Stated post PCI, with ALLEN to left circumflex, and RCA  Started on statin, aspirin and Brilinta, metoprolol, follow-up with the Cardiology in the next few weeks    PAD (peripheral artery disease) ([facility] [address] )    Tobacco abuse       Recent hospital stay for NSTEMI, status post a PCI with the LALEN x1 to left circumflex, ALLEN x2 to RCA  New medications-aspirin, Brilinta, metoprolol, atorvastatin  Patient has not filled his prescriptions for Brilinta and aspirin as yet  Called [facility] pharmacy and medications are ready for pickup  Patient is counseled for medication compliance  Discussed lifestyle modifications, dash diet and encouraged smoking cessation  Subjective:     Patient ID: Ahmet Swain is a 67 y o  male  HPI    Review of Systems   Constitutional: Negative for activity change, appetite change, chills, diaphoresis, fatigue, fever and unexpected weight change  HENT: Negative for congestion and sore throat  Respiratory: Negative for apnea, cough, choking, chest tightness, shortness of breath, wheezing and stridor  Cardiovascular: Negative for chest pain, palpitations and leg swelling  Gastrointestinal: Negative for abdominal distention, abdominal pain, blood in stool, constipation, nausea and rectal pain  Genitourinary: Negative for dysuria, flank pain, frequency and urgency  Musculoskeletal: Negative for arthralgias, back pain, gait problem, joint swelling and myalgias     Skin: Negative for color change, pallor and rash  Neurological: Negative for headaches  Objective:     Physical Exam  Vitals reviewed  Constitutional:       General: He is not in acute distress  Appearance: Normal appearance  He is not ill-appearing, toxic-appearing or diaphoretic  HENT:      Mouth/Throat:      Mouth: Mucous membranes are moist    Cardiovascular:      Rate and Rhythm: Normal rate and regular rhythm  Pulses: Normal pulses  Heart sounds: Normal heart sounds  No murmur heard  No friction rub  No gallop  Pulmonary:      Effort: Pulmonary effort is normal  No respiratory distress  Breath sounds: Normal breath sounds  No stridor  No wheezing, rhonchi or rales  Chest:      Chest wall: No tenderness  Musculoskeletal:         General: No swelling or tenderness  Right lower leg: No edema  Left lower leg: No edema  Skin:     General: Skin is warm and dry  Findings: No lesion or rash  Neurological:      Mental Status: He is alert and oriented to person, place, and time  Cranial Nerves: Cranial nerves are intact  Sensory: Sensation is intact  Motor: Motor function is intact  No weakness, tremor or atrophy  Deep Tendon Reflexes: Reflexes are normal and symmetric  Babinski sign absent on the right side  Babinski sign absent on the left side  Reflex Scores:       Patellar reflexes are 2+ on the right side and 2+ on the left side  Vitals:    06/30/22 1001   BP: 140/78   BP Location: Right arm   Patient Position: Sitting   Cuff Size: Standard   Pulse: 75   Temp: 98 9 °F (37 2 °C)   TempSrc: Temporal   SpO2: 98%   Weight: 77 1 kg (170 lb)   Height: 5' 10" (1 778 m)       Transitional Care Management Review:  Ludmila Hernandez is a 67 y o  male here for TCM follow up       During the TCM phone call patient stated:    TCM Call (since 5/30/2022)     Date and time call was made  6/30/2022  8:48 AM    Hospital care reviewed  Records reviewed    Patient [facility] hospitial[facility] at  [facility]    Date of Admission  06/28/22    Date of discharge  06/29/22    Diagnosis  NSTEMI    Disposition  Home    Current Symptoms  None      TCM Call (since 5/30/2022)     Post hospital issues  None    Scheduled for follow up?   Yes    Did you obtain your prescribed medications  Yes  will get today    Do you need help managing your prescriptions or medications  No    Is transportation to your appointment needed  No    I have advised the patient to call PCP with any new or worsening symptoms  Gamal Hightower MD

## 2022-06-30 NOTE — PATIENT INSTRUCTIONS
DASH Eating Plan   WHAT YOU NEED TO KNOW:   The DASH (Dietary Approaches to Stop Hypertension) Eating Plan is designed to help prevent or lower high blood pressure  It can also help to lower LDL (bad) cholesterol and decrease your risk for heart disease  The plan is low in sodium, sugar, unhealthy fats, and total fat  It is high in potassium, calcium, magnesium, and fiber  These nutrients are added when you eat more fruits, vegetables, and whole grains  With the DASH eating plan, you need to eat a certain number of servings from each food group  This will help you get enough of certain nutrients and limit others  The amount of servings you should eat depends on how many calories you need  Your dietitian can help you create meal plans with the right number of servings for each food group  DISCHARGE INSTRUCTIONS:   What you need to know about sodium:  Your dietitian will tell you how much sodium is safe for you to have each day  People with high blood pressure should have no more than 1,500 to 2,300 mg of sodium in a day  A teaspoon (tsp) of salt has 2,300 mg of sodium  This may seem like a difficult goal, but small changes to the foods you eat can make a big difference  Your healthcare provider or dietitian can help you create a meal plan that follows your sodium limit  Read food labels  Food labels can help you choose foods that are low in sodium  The amount of sodium is listed in milligrams (mg)  The % Daily Value (DV) column tells you how much of your daily needs are met by 1 serving of the food for each nutrient listed  Choose foods that have less than 5% of the DV of sodium  These foods are considered low in sodium  Foods that have 20% or more of the DV of sodium are considered high in sodium  Avoid foods that have more than 300 mg of sodium in each serving  Choose foods that say low-sodium, reduced-sodium, or no salt added on the food label  Limit added salt    Do not salt food at the table if you add salt when you cook  Use herbs and spices, such as onions, garlic, and salt-free seasonings to add flavor  Try lemon or lime juice or vinegar to add a tart flavor  Use hot peppers or a small amount of hot pepper sauce to add a spicy flavor  Limit foods high in added salt, such as the following:    Seasonings made with salt, such as garlic salt, celery salt, onion salt, seasoned salt, meat tenderizers, and monosodium glutamate (MSG)    Miso soup and canned or dried soup mixes    Regular soy sauce, barbecue sauce, teriyaki sauce, steak sauce, Worcestershire sauce, and most flavored vinegars    Snack foods, such as salted chips, popcorn, pretzels, pork rinds, salted crackers, and salted nuts    Frozen foods, such as dinners, entrees, vegetables with sauces, and breaded meats    Ask about salt substitutes  Ask your healthcare provider if you may use salt substitutes  Some salt substitutes have ingredients that can be harmful if you have certain health conditions  Choose foods carefully at restaurants  Meals from restaurants, especially fast food restaurants, are often high in sodium  Some restaurants have nutrition information that tells you the amount of sodium in their foods  Ask to have your food prepared with less, or no salt  What you need to know about fats:  Healthy fats include unsaturated fats and omega-3 fatty acids  Unhealthy fats include saturated fats and trans fats    Include healthy fats, such as the following:      Cooking oils, such as soybean, canola, olive, or sunflower    Fatty fish, such as salmon, tuna, mackerel, or sardines    Flaxseed oil or ground flaxseed    ½ cup of cooked beans, such as black beans, kidney beans, or jackson beans    1½ ounces of low-sodium nuts, such as almonds or walnuts    Low-sugar, low-sodium peanut butter    Seeds such as mariam seeds or sunflower seeds       Limit or do not have unhealthy fats, such as the following:      Foods that contain fat from animals, such as fatty meats, whole milk, butter, and cream    Shortening, stick margarine, palm oil, and coconut oil    Full-fat or creamy salad dressing    Creamy soup    Crackers, chips, and baked goods made with margarine or shortening    Foods that are fried in unhealthy fats    Gravy and sauces, such as Alex or cheese sauces    What you need to know about carbohydrates (carbs): All carbs break down into sugar  Complex carbs contain more fiber than simple carbs  This means complex carbs go into the bloodstream more slowly and cause less of a blood sugar spike  Try to include more complex carbs and fewer simple carbs  Include complex carbs, such as the followin slice of whole-grain bread    1 ounce of dry cereal that does not contain added sugar    ½ cup of cooked oatmeal    2 ounces of cooked whole-grain pasta    ½ cup of cooked brown rice    Limit or do not have simple carbs, such as the following:      [employer], such as doughnuts, pastries, and cookies    Mixes for cornbread and biscuits    White rice and pasta mixes, such as boxed macaroni and cheese    Instant and cold cereals that contain sugar    Jelly, jam, and ice cream that contain sugar    Condiments such as ketchup    Drinks high in sugar, such as soft drinks, lemonade, and fruit juice    What you need to know about vegetables and fruits:  Vegetables and fruits can be fresh, frozen, or canned  If possible, try to choose low-sodium canned options    Include a variety of vegetables and fruits, such as the followin medium apple, pear, or peach (about ½ cup chopped)    ½ small banana    ½ cup berries, such as blueberries, strawberries, or blackberries    1 cup of raw leafy greens, such as lettuce, spinach, kale, or art greens    ½ cup of frozen or canned (no added salt) vegetables, such as green beans    ½ cup of fresh, frozen, or canned fruit (canned in light syrup or fruit juice)    ½ cup of vegetable or fruit juice    Limit or do not have vegetables and fruits made in the following ways:      Frozen fruit such as cherries that have added sugar    Fruit in cream or butter sauce    Canned vegetables that are high in sodium    Sauerkraut, pickled vegetables, and other foods prepared in brine    Fried vegetables or vegetables in butter or high-fat sauces    What you need to know about protein foods: Include lean or low-fat protein foods, such as the following:      Poultry (chicken, turkey) with no skin    Fish (especially fatty fish, such as salmon, fresh tuna, or mackerel)    Lean beef and pork (loin, round, extra lean hamburger)    Egg whites and egg substitutes    1 cup of nonfat (skim) or 1% milk    1½ ounces of fat-free or low-fat cheese    6 ounces of nonfat or low-fat yogurt    Limit or do not have high-fat protein foods, such as the following:      Smoked or cured meat, such as corned beef, lazaro, ham, hot dogs, and sausage    Canned beans and canned meats or spreads, such as potted meats, sardines, anchovies, and imitation seafood    Deli or lunch meats, such as bologna, ham, turkey, and roast beef    High-fat meat (T-bone steak, regular hamburger, and ribs)    Whole eggs and egg yolks    Whole milk, 2% milk, and cream    Regular cheese and processed cheese    Other guidelines to follow:   Maintain a healthy weight  Your risk for heart disease is higher if you are overweight  Your healthcare provider may suggest that you lose weight if you are overweight  You can lose weight by eating fewer calories and foods that have added sugars and fat  The DASH meal plan can help you do this  Decrease calories by eating smaller portions at each meal and fewer snacks  Ask your healthcare provider for more information about how to lose weight  Exercise regularly  Regular exercise can help you reach or maintain a healthy weight  Regular exercise can also help decrease your blood pressure and improve your cholesterol levels   Get 30 minutes or more of moderate exercise each day of the week  To lose weight, get at least 60 minutes of exercise  Talk to your healthcare provider about the best exercise program for you  Limit alcohol  Women should limit alcohol to 1 drink a day  Men should limit alcohol to 2 drinks a day  A drink of alcohol is 12 ounces of beer, 5 ounces of wine, or 1½ ounces of liquor  For more information:   National [facility], [facility] and [address]  [address] [address]  [place] [address]  Phone: 1- 160 - 480-3020  Web Address: [facility] no    © [facility] 2022 Information is for End User's use only and may not be sold, redistributed or otherwise used for commercial purposes  All illustrations and images included in CareNotes® are the copyrighted property of A D A M , Inc  or [employer]   The above information is an  only  It is not intended as medical advice for individual conditions or treatments  Talk to your doctor, nurse or pharmacist before following any medical regimen to see if it is safe and effective for you  Coronary Artery Disease   Vena Prabhjot SHERIDAN, Zander Castellano, et al: 2021 AHA/ACC/ASE/CHEST/SAEM/SCCT/SCMR Guideline for the Evaluation and Diagnosis of Chest Pain: A Report of the [facility] on Clinical Practice Guidelines  Circulation 2021; 144(22):i279-q214  Marcela Chun, Myron, et al: 2021 ACC/AHA/SCAI guideline for coronary artery revascularization: executive summary: a report of the [facility] on Clinical Practice Guidelines  Circulation 2021; Epub:Epub  Jo Ann CAVANAUGH, Ronal Aldana, et al: Obesity and cardiovascular disease: a scientific statement from the American Heart Association  Circulation 2021; 143(21):c128-b7192    Sade HANLEY and Donaldo Hollingsworth BS: Coronary Artery Disease  [facility], [address] [place] [address]  Available from URL: [employer] no  As accessed 2021-06-15  Morgan M and Benjamín ZULUAGA: Coronary Artery Disease Prevention  [facility], [address]  Available from URL: Kulwinder huff  As accessed 2021-06-15  Ozzie MONTANO, Libby Montero, et al: 2019 ACC/AHA Guideline on the primary prevention of cardiovascular disease: executive summary: a report of the [employer] of Cardiology/American Heart Association Task Force on Clinical Practice Guidelines  J Am Allison Cardiol 2019; 74(10):6682-3106  Indy GOLDMAN 379 M: Coronary Artery Disease and Stable Angina  In: John LOZOYA, ed  The 5-Minute Clinical Consult 2019, 27th ed  [address], 2019  [facility] [place] for Chronic Disease Prevention and Health Promotion , Division for Heart Disease and Stroke Prevention: Coronary artery disease (CAD)  Centers for Disease Control and Prevention ([facility])  [place], [address]  Available from URL: http://kinza org/  As accessed 2021-06-15  Tessa SM : Cardiac Disorders  In: Taye Manual of Nursing Practice, 11th ed  [facility],[address] [place] [address], [place], 2019  Ferguson MM, SHADIA, & Luis CH: Stable coronary artery disease: treatment  Am [facility] Physician 1347; 76(1):561-734  Rosa Maria ALCALA Jr & Zechariah N: Coronary artery disease: diagnosis and management  Prim Care 2018; 45(1):45-61  Dior DM, Kolton PB, Spenser CM, et al: 2016 [facility] Expert Consensus Decision Pathway on the Role of Non-Statin Therapies for LDL-Cholesterol Lowering in the Management of Atherosclerotic Cardiovascular Disease Risk: A Report of the [employer] of Cardiology Task Force on Clinical Expert Consensus Documents  J Am Allison Cardiol 2016; 68(1):    Karan Santa ER, Theresa Ceja, et al: 2016 ACC/AHA guideline focused update on duration of dual antiplatelet therapy in patients with coronary artery disease: a report of the [employer] of Cardiology/American heart Association Task Force on Clinical Practice Guidelines  Circulation 2016; 134(10):j617-n866  US Preventive Services Task Force: Final recommendation statement: Aspirin use to prevent cardiovascular disease and colorectal cancer: Preventive medication  US Preventive Services Task Force  Elba Cuevas MD  2016  Available from URL: Jaycob veliz  As accessed 2016-05-12  © Copyright [employer] 2022 Information is for End User's use only and may not be sold, redistributed or otherwise used for commercial purposes  All illustrations and images included in CareNotes® are the copyrighted property of A D A M , Inc  or [address] [place], [place] [address]jerica   The above information is an  only  It is not intended as medical advice for individual conditions or treatments  Talk to your doctor, nurse or pharmacist before following any medical regimen to see if it is safe and effective for you

## 2022-07-13 PROBLEM — Z09 HOSPITAL DISCHARGE FOLLOW-UP: Status: ACTIVE | Noted: 2022-07-13

## 2022-07-13 PROBLEM — I25.10 CORONARY ARTERY DISEASE INVOLVING NATIVE CORONARY ARTERY OF NATIVE HEART WITHOUT ANGINA PECTORIS: Status: ACTIVE | Noted: 2022-07-13

## 2022-07-13 NOTE — PROGRESS NOTES
Cardiology  MI Follow Up   Office Visit Note  Jerome Rubio   67 y o    male   MRN: [id number]  [address]  [address]  [address] [place] [address]  EMILY Smith 54 [id number] [phone]    PCP: Princess Santana MD  Cardiologist:  Will be Dr Billie Thayer            Summary of recommendations  Heart healthy diet  Educational information provided  Smoking cessation imperative counseling provided x5 minutes  He has cut down but finds this quite hard  CBC, BMP, hemoglobin A1c, TSH-risk stratification  Fasting lipid profile 8 weeks  Cardiac rehab has been prescribed recommended scheduled for 8/2/22  Medical adherence to dual antiplatelet therapy reinforced  SL ntg with instruction for use  Follow up will be scheduled with Dr Billie Thayer 6-8 weeks        Assessment/plan  CAD,s/p NSTEMI; PCI circumflex and RCA, S/P  ALLEN -mCx,mid ,and 2 to his RCA in the mid and distal regions  Adm 6/28-6/290/22    On aspirin, Brilinta, statin, beta-blocker   Cardiac rehab has been prescribed and recommended   Adherence to dual antiplatelet therapy reinforced  Preserved LV function hypokinetic: basal inferior, basal inferolateral, mid inferior and mid inferolateral walls  Mild to moderate AI  Hypertension, essential   BP  162/91 by  Me  The pt obtained a similar reading at home this am    · on lisinopril 20 mg daily, metoprolol tartrate 25 mg q 12, amlodipine 10 mg daily  Today, increase metoprolol tartrate to 50 mg q12h  Hyperlipidemia, on atorvastatin 40 mg daily  6/20/22:    Non   Goal LDL less than 70, closer to 55 given he is very high risk:  recurrent CAD/PVD, tobacco abuse ( ESC Guidelines, FOURIER trial, ODYSSEY trial)  Peripheral arterial disease s/p prior R femoral-popliteal bypass (11/2017)  Tobacco abuse 1 PPD x 50 yrs= 50 pk yrs  Cessation imperative  He finds this difficult to quit  Cardiac testing   TTE  EF 60%  Wall thickness mildly increased  Mild LVH  Global longitudinal strain is reduced at -15%  Diastolic function is mildly abnormal, consistent with grade I (abnormal) relaxation  The following segments are hypokinetic: basal inferior, basal inferolateral, mid inferior and mid inferolateral All other segments are normal   Mild LAE  Mild-to-moderate AI  Mild MR  Ascending aorta mildly dilated 3 9 cm   Left heart catheterization 6/28/22  · Prox LAD lesion is 50% stenosed  · Mid Cx lesion is 70% stenosed  · Dist RCA lesion is 90% stenosed  · Mid RCA lesion is 70% stenosed  Significant CAD involving the mid circumflex, mid RCA and distal RCA  A 50% proximal LAD stenosis was not flow-limiting by iFR (iFR=0 92)  Successful IVUS-guided PCI of the mid circumflex (3 0x18mm, distal RCA (2 30y36na) and mid RCA (3 5x23) (non-overlapping)  Plan: DAPT, statin, smoking cessation, cardiac rehabilitation  HPI  Halie Jaimes is a 68 yo male with essential hypertension, mixed dyslipidemia, tobacco use, paroxysmal atrial tachycardia  He does not have any known history of CAD, heart failure, nor arrhythmia  Adm 6/28-6/290/22   Chief complaint:  Midsternal chest burning, while sitting in the living room  Discomfort radiated down both his arms  Became diaphoretic  He drove himself to the emergency room   His EKG demonstrated NSR, with ST T-wave abnormalities noted in the inferior/lateral leads  HsTn HS levels 41 -- 110 -- 552  He was followed by Cardiology  He was diagnosed with a non-STEMI   Left heart catheterization: significant lesions of his circumflex and RCA, receiving a drug-eluting stent to his mid left circumflex, and 2 to his RCA in the mid and distal regions  Echocardiogram: Preserved EF, hypokinesis of basal inferior, basal inferolateral, mid inferior and mid inferolateral   Placed on GDMT with aspirin 81, Brilinta  Metoprolol tartrate was added  Smoking cessation imperative   Encourage cardiac rehab    7/18/22  Hospital follow-up   Overall, he is doing okay    He is adjusting to the many medications which are new to him  He denies chest pain, shortness of breath, lightheadedness or dizziness  Unfortunately, he is finding it difficult to quit smoking  He is compliant with his medications   He has scheduled cardiac rehab  Together, we reviewed his cardiac angiogram diagram   He is aware of the findings   We reviewed his echocardiogram   He is aware of the wall motion abnormalities, and his valvular aortic insufficiency  We discussed the importance of adherence to dual antiplatelet therapy, adherence to a heart healthy diet including smoking cessation  His son-in-law is an oncologist who works in [place] is was also encouraging him to quit smoking  The patient finds this quite difficult as this is a habit he has had for most of his life    Medications were refilled; sublingual nitroglycerin with instructions for use  He will return to see his cardiologist- 6-8 weeks            I have spent 40 minutes with Patient today in which greater than 50% of this time was spent in counseling/coordination of care regarding Intructions for management, Patient and family education, Importance of tx compliance and Risk factor reductions  Assessment  Diagnoses and all orders for this visit:    Hospital discharge follow-up    Primary hypertension  -     metoprolol tartrate (LOPRESSOR) 50 mg tablet; Take 1 tablet (50 mg total) by mouth every 12 (twelve) hours    NSTEMI (non-ST elevated myocardial infarction) (HCC)  -     atorvastatin (LIPITOR) 40 mg tablet; Take 1 tablet (40 mg total) by mouth daily with dinner  -     ticagrelor (BRILINTA) 90 MG; Take 1 tablet (90 mg total) by mouth every 12 (twelve) hours    CAD (coronary artery disease)  -     ticagrelor (BRILINTA) 90 MG; Take 1 tablet (90 mg total) by mouth every 12 (twelve) hours  -     Basic metabolic panel;  Future    Hx of right coronary artery stent placement    Presence of drug-eluting stent in left circumflex coronary artery    S/P drug eluting coronary stent placement  -     ticagrelor (BRILINTA) 90 MG; Take 1 tablet (90 mg total) by mouth every 12 (twelve) hours  -     nitroglycerin (NITROSTAT) 0 4 mg SL tablet; Place 1 tablet (0 4 mg total) under the tongue every 5 (five) minutes as needed for chest pain  -     CBC and Platelet; Future    Tobacco abuse    PAD (peripheral artery disease) (HCC)    Pure hypercholesterolemia  -     Lipid panel; Future  -     TSH, 3rd generation with Free T4 reflex; Future  -     Hemoglobin A1C; Future    Other orders  -     Cancel: TSH, 3rd generation with Free T4 reflex; Future  -     Cancel: HEMOGLOBIN A1C W/ EAG ESTIMATION; Future          Past Medical History:   Diagnosis Date    Hypertension        Review of Systems   Constitutional: Negative for chills  Cardiovascular: Negative for chest pain, claudication, cyanosis, dyspnea on exertion, irregular heartbeat, leg swelling, near-syncope, orthopnea, palpitations, paroxysmal nocturnal dyspnea and syncope  Respiratory: Negative for cough and shortness of breath  Gastrointestinal: Negative for heartburn and nausea  Neurological: Negative for dizziness, focal weakness, headaches, light-headedness and weakness  All other systems reviewed and are negative  No Known Allergies        Current Outpatient Medications:     amLODIPine (NORVASC) 10 mg tablet, Take 1 tablet (10 mg total) by mouth daily, Disp: 90 tablet, Rfl: 0    aspirin 81 mg chewable tablet, Chew 1 tablet (81 mg total) daily, Disp: 30 tablet, Rfl: 0    atorvastatin (LIPITOR) 40 mg tablet, Take 1 tablet (40 mg total) by mouth daily with dinner, Disp: 90 tablet, Rfl: 3    lisinopril (ZESTRIL) 20 mg tablet, TAKE 1 TABLET BY MOUTH EVERY DAY, Disp: 90 tablet, Rfl: 0    metoprolol tartrate (LOPRESSOR) 50 mg tablet, Take 1 tablet (50 mg total) by mouth every 12 (twelve) hours, Disp: 180 tablet, Rfl: 3    nitroglycerin (NITROSTAT) 0 4 mg SL tablet, Place 1 tablet (0 4 mg total) under the tongue every 5 (five) minutes as needed for chest pain, Disp: 24 tablet, Rfl: 1    ticagrelor (BRILINTA) 90 MG, Take 1 tablet (90 mg total) by mouth every 12 (twelve) hours, Disp: 180 tablet, Rfl: 3        Social History     Socioeconomic History    Marital status: Single     Spouse name: Not on file    Number of children: Not on file    Years of education: Not on file    Highest education level: Not on file   Occupational History    Not on file   Tobacco Use    Smoking status: Heavy Tobacco Smoker     Packs/day: 0 50     Types: Cigarettes     Start date: 3/11/1975    Smokeless tobacco: Never Used   Vaping Use    Vaping Use: Never used   Substance and Sexual Activity    Alcohol use: Yes     Comment: ocassionally    Drug use: Never    Sexual activity: Not on file   Other Topics Concern    Not on file   Social History Narrative    Not on file     Social Determinants of Health     Financial Resource Strain: Not on file   Food Insecurity: Not on file   Transportation Needs: Not on file   Physical Activity: Not on file   Stress: Not on file   Social Connections: Not on file   Intimate Partner Violence: Not on file   Housing Stability: Not on file       Family History   Problem Relation Age of Onset    Stomach cancer Mother     No Known Problems Father        Physical Exam  Vitals and nursing note reviewed  Constitutional:       General: He is not in acute distress  HENT:      Head: Normocephalic and atraumatic  Eyes:      Conjunctiva/sclera: Conjunctivae normal    Cardiovascular:      Rate and Rhythm: Normal rate and regular rhythm  Pulses: Intact distal pulses  Heart sounds: Normal heart sounds  Pulmonary:      Effort: Pulmonary effort is normal       Breath sounds: Normal breath sounds  Abdominal:      General: Bowel sounds are normal       Palpations: Abdomen is soft  Musculoskeletal:         General: Normal range of motion        Cervical back: Normal range of motion and neck supple  Skin:     General: Skin is warm and dry  Neurological:      Mental Status: He is alert and oriented to person, place, and time  Vitals: Blood pressure 162/91, pulse 73, height 5' 10" (1 778 m), weight 77 3 kg (170 lb 6 4 oz), SpO2 96 %  Wt Readings from Last 3 Encounters:   07/18/22 77 3 kg (170 lb 6 4 oz)   06/30/22 77 1 kg (170 lb)   06/28/22 78 9 kg (174 lb)         Labs & Results:  Lab Results   Component Value Date    WBC 11 27 (H) 06/29/2022    HGB 14 3 06/29/2022    HCT 43 9 06/29/2022    MCV 89 06/29/2022     06/29/2022     No results found for: BNP  No components found for: CHEM  No results found for: CKTOTAL, TROPONINI, TROPONINT, CKMBINDEX  No results found for this or any previous visit  No results found for this or any previous visit  This note was completed in part utilizing [employer] fluency direct voice recognition software  Grammatical errors, random word insertion, spelling mistakes, and incomplete sentences may be an occasional consequence of the system secondary to software limitations, ambient noise and hardware issues  At the time of dictation, efforts were made to edit, clarify and /or correct errors  Please read the chart carefully and recognize, using context, where substitutions have occurred    If you have any questions or concerns about the context, text or information contained within the body of this dictation, please contact myself, the provider, for further clarification

## 2022-07-18 ENCOUNTER — OFFICE VISIT (OUTPATIENT)
Dept: CARDIOLOGY CLINIC | Facility: CLINIC | Age: 72
End: 2022-07-18
Payer: COMMERCIAL

## 2022-07-18 VITALS
WEIGHT: 170.4 LBS | HEIGHT: 70 IN | HEART RATE: 73 BPM | SYSTOLIC BLOOD PRESSURE: 162 MMHG | DIASTOLIC BLOOD PRESSURE: 91 MMHG | OXYGEN SATURATION: 96 % | BODY MASS INDEX: 24.39 KG/M2

## 2022-07-18 DIAGNOSIS — Z95.5 HX OF RIGHT CORONARY ARTERY STENT PLACEMENT: ICD-10-CM

## 2022-07-18 DIAGNOSIS — E78.00 PURE HYPERCHOLESTEROLEMIA: ICD-10-CM

## 2022-07-18 DIAGNOSIS — Z72.0 TOBACCO ABUSE: ICD-10-CM

## 2022-07-18 DIAGNOSIS — I25.10 CAD (CORONARY ARTERY DISEASE): ICD-10-CM

## 2022-07-18 DIAGNOSIS — Z95.5 S/P DRUG ELUTING CORONARY STENT PLACEMENT: ICD-10-CM

## 2022-07-18 DIAGNOSIS — Z95.5 PRESENCE OF DRUG-ELUTING STENT IN LEFT CIRCUMFLEX CORONARY ARTERY: ICD-10-CM

## 2022-07-18 DIAGNOSIS — I10 PRIMARY HYPERTENSION: ICD-10-CM

## 2022-07-18 DIAGNOSIS — I21.4 NSTEMI (NON-ST ELEVATED MYOCARDIAL INFARCTION) (HCC): ICD-10-CM

## 2022-07-18 DIAGNOSIS — Z09 HOSPITAL DISCHARGE FOLLOW-UP: Primary | ICD-10-CM

## 2022-07-18 DIAGNOSIS — I73.9 PAD (PERIPHERAL ARTERY DISEASE) (HCC): ICD-10-CM

## 2022-07-18 PROCEDURE — 1160F RVW MEDS BY RX/DR IN RCRD: CPT | Performed by: NURSE PRACTITIONER

## 2022-07-18 PROCEDURE — 1111F DSCHRG MED/CURRENT MED MERGE: CPT | Performed by: NURSE PRACTITIONER

## 2022-07-18 PROCEDURE — 3077F SYST BP >= 140 MM HG: CPT | Performed by: NURSE PRACTITIONER

## 2022-07-18 PROCEDURE — 99215 OFFICE O/P EST HI 40 MIN: CPT | Performed by: NURSE PRACTITIONER

## 2022-07-18 PROCEDURE — 3080F DIAST BP >= 90 MM HG: CPT | Performed by: NURSE PRACTITIONER

## 2022-07-18 RX ORDER — ATORVASTATIN CALCIUM 40 MG/1
40 TABLET, FILM COATED ORAL
Qty: 90 TABLET | Refills: 3 | Status: SHIPPED | OUTPATIENT
Start: 2022-07-18 | End: 2023-07-13

## 2022-07-18 RX ORDER — NITROGLYCERIN 0.4 MG/1
0.4 TABLET SUBLINGUAL
Qty: 24 TABLET | Refills: 1 | Status: SHIPPED | OUTPATIENT
Start: 2022-07-18

## 2022-07-18 RX ORDER — METOPROLOL TARTRATE 50 MG/1
50 TABLET, FILM COATED ORAL EVERY 12 HOURS SCHEDULED
Qty: 180 TABLET | Refills: 3 | Status: SHIPPED | OUTPATIENT
Start: 2022-07-18 | End: 2022-07-28

## 2022-07-18 NOTE — PATIENT INSTRUCTIONS
Cigarette Smoking and Your Health   AMBULATORY CARE:   Risks to your health if you smoke:  Nicotine and other chemicals found in tobacco and e-cigarettes can damage every cell in your body  Even if you are a light smoker, you have an increased risk for cancer, heart disease, and lung disease  If you are pregnant or have diabetes, smoking increases your risk for complications  Nicotine can affect an adolescent's developing brain  This can lead to trouble thinking, learning, or paying attention  Benefits to your health if you stop smoking: You decrease respiratory symptoms such as coughing, wheezing, and shortness of breath  You reduce your risk for cancers of the lung, mouth, throat, kidney, bladder, pancreas, stomach, and cervix  If you already have cancer, you increase the benefits of chemotherapy  You also reduce your risk for cancer returning or a second cancer from developing  You reduce your risk for heart disease, blood clots, heart attack, and stroke  You reduce your risk for lung infections, and diseases such as pneumonia, asthma, chronic bronchitis, and emphysema  Your circulation improves  More oxygen can be delivered to your body  If you have diabetes, you lower your risk for complications, such as kidney, artery, and eye diseases  You also lower your risk for nerve damage  Nerve damage can lead to amputations, poor vision, and blindness  You improve your body's ability to heal and to fight infections  An adolescent can help his or her brain and body develop in a healthy way  Talk to your adolescent about all the health risks of nicotine  If you can, start talking about nicotine when your child is younger than 12 years  This may make it easier for him or her not to start using nicotine as a teenager or adult  Explain to him or her that it is best never to start  It can be hard to try to quit later      Benefits to the health of others if you stop smoking:  Tobacco is harmful to nonsmokers who breathe in your secondhand smoke  The following are ways the health of others around you may improve when you stop smoking: You lower the risks for lung cancer and heart disease in nonsmoking adults  If you are pregnant, you lower the risk for miscarriage, early delivery, low birth weight, and stillbirth  You also lower your baby's risk for SIDS, obesity, developmental delay, and neurobehavioral problems, such as ADHD  If you have children, you lower their risk for ear infections, colds, pneumonia, bronchitis, and asthma  Follow up with your doctor as directed:  Write down your questions so you remember to ask them during your visits  For support and more information:   [employer]  [address] [place] [address],[address]  [address]  Phone: [facility] [address]  Phone: 2- [phone] - [phone]  Web Address: [employer]    Smokefree  gov  Phone: 0- 158 - 962-3512  Web Address: [employer] smokefree  [facility] 21 2022 Information is for End User's use only and may not be sold, redistributed or otherwise used for commercial purposes  All illustrations and images included in CareNotes® are the copyrighted property of A D A M , Inc  or [address] [place], [place] [address]   The above information is an  only  It is not intended as medical advice for individual conditions or treatments  Talk to your doctor, nurse or pharmacist before following any medical regimen to see if it is safe and effective for you

## 2022-07-18 NOTE — LETTER
July 18, 2022     Gabe Mi MD  [address]   [address]  [address] [facility] [address]    Patient: Cong Pete   YOB: 1950   Date of Visit: 7/18/2022       Dear Dr Rogerio Martinez: Thank you for referring Cong Pete to me for evaluation  Below are my notes for this consultation  If you have questions, please do not hesitate to call me  I look forward to following your patient along with you  Sincerely,        KORTNEY Crump        CC: MD Renata Sousa, [address]  7/18/2022  3:12 PM  Sign when Signing Visit  Cardiology  MI Follow Up   Office Visit Note  Cong Pete   67 y o    male   MRN: [id number]  [address]  [address] [address] [phone]    PCP: Gabe Mi MD  Cardiologist:  Will be Dr Nga Burnett            Summary of recommendations  Heart healthy diet  Educational information provided  Smoking cessation imperative counseling provided x5 minutes  He has cut down but finds this quite hard  CBC, BMP, hemoglobin A1c, TSH-risk stratification  Fasting lipid profile 8 weeks  Cardiac rehab has been prescribed recommended scheduled for 8/2/22  Medical adherence to dual antiplatelet therapy reinforced  SL ntg with instruction for use  Follow up will be scheduled with Dr Nga Burnett 6-8 weeks        Assessment/plan  CAD,s/p NSTEMI; PCI circumflex and RCA, S/P  ALLEN -mCx,mid ,and 2 to his RCA in the mid and distal regions  Adm 6/28-6/290/22    On aspirin, Brilinta, statin, beta-blocker   Cardiac rehab has been prescribed and recommended   Adherence to dual antiplatelet therapy reinforced  Preserved LV function hypokinetic: basal inferior, basal inferolateral, mid inferior and mid inferolateral walls  Mild to moderate AI  Hypertension, essential   BP  162/91 by  Me    The pt obtained a similar reading at home this am    · on lisinopril 20 mg daily, metoprolol tartrate 25 mg q 12, amlodipine 10 mg daily  Today, increase metoprolol tartrate to 50 mg q12h  Hyperlipidemia, on atorvastatin 40 mg daily  6/20/22:    Non   Goal LDL less than 70, closer to 55 given he is very high risk:  recurrent CAD/PVD, tobacco abuse ( ESC Guidelines, FOURIER trial, ODYSSEY trial)  Peripheral arterial disease s/p prior R femoral-popliteal bypass (11/2017)  Tobacco abuse 1 PPD x 50 yrs= 50 pk yrs  Cessation imperative  He finds this difficult to quit  Cardiac testing   TTE  EF 60%  Wall thickness mildly increased  Mild LVH  Global longitudinal strain is reduced at -15%  Diastolic function is mildly abnormal, consistent with grade I (abnormal) relaxation  The following segments are hypokinetic: basal inferior, basal inferolateral, mid inferior and mid inferolateral All other segments are normal   Mild LAE  Mild-to-moderate AI  Mild MR  Ascending aorta mildly dilated 3 9 cm   Left heart catheterization 6/28/22  · Prox LAD lesion is 50% stenosed  · Mid Cx lesion is 70% stenosed  · Dist RCA lesion is 90% stenosed  · Mid RCA lesion is 70% stenosed  Significant CAD involving the mid circumflex, mid RCA and distal RCA  A 50% proximal LAD stenosis was not flow-limiting by iFR (iFR=0 92)  Successful IVUS-guided PCI of the mid circumflex (3 0x18mm, distal RCA (2 61b78io) and mid RCA (3 5x23) (non-overlapping)  Plan: DAPT, statin, smoking cessation, cardiac rehabilitation  HPI  Debi Laurent is a 66 yo male with essential hypertension, mixed dyslipidemia, tobacco use, paroxysmal atrial tachycardia  He does not have any known history of CAD, heart failure, nor arrhythmia  Adm 6/28-6/290/22   Chief complaint:  Midsternal chest burning, while sitting in the living room  Discomfort radiated down both his arms  Became diaphoretic  He drove himself to the emergency room   His EKG demonstrated NSR, with ST T-wave abnormalities noted in the inferior/lateral leads   HsTn HS levels 41 -- 110 -- 552  He was followed by Cardiology  He was diagnosed with a non-STEMI   Left heart catheterization: significant lesions of his circumflex and RCA, receiving a drug-eluting stent to his mid left circumflex, and 2 to his RCA in the mid and distal regions  Echocardiogram: Preserved EF, hypokinesis of basal inferior, basal inferolateral, mid inferior and mid inferolateral   Placed on GDMT with aspirin 81, Brilinta  Metoprolol tartrate was added  Smoking cessation imperative   Encourage cardiac rehab    7/18/22  Hospital follow-up   Overall, he is doing okay  He is adjusting to the many medications which are new to him  He denies chest pain, shortness of breath, lightheadedness or dizziness  Unfortunately, he is finding it difficult to quit smoking  He is compliant with his medications   He has scheduled cardiac rehab  Together, we reviewed his cardiac angiogram diagram   He is aware of the findings   We reviewed his echocardiogram   He is aware of the wall motion abnormalities, and his valvular aortic insufficiency  We discussed the importance of adherence to dual antiplatelet therapy, adherence to a heart healthy diet including smoking cessation  His son-in-law is an oncologist who works in [place] is was also encouraging him to quit smoking  The patient finds this quite difficult as this is a habit he has had for most of his life    Medications were refilled; sublingual nitroglycerin with instructions for use  He will return to see his cardiologist- 6-8 weeks            I have spent 40 minutes with Patient today in which greater than 50% of this time was spent in counseling/coordination of care regarding Intructions for management, Patient and family education, Importance of tx compliance and Risk factor reductions  Assessment  Diagnoses and all orders for this visit:    Hospital discharge follow-up    Primary hypertension  -     metoprolol tartrate (LOPRESSOR) 50 mg tablet;  Take 1 tablet (50 mg total) by mouth every 12 (twelve) hours    NSTEMI (non-ST elevated myocardial infarction) ([facility])  -     atorvastatin (LIPITOR) 40 mg tablet; Take 1 tablet (40 mg total) by mouth daily with dinner  -     ticagrelor (BRILINTA) 90 MG; Take 1 tablet (90 mg total) by mouth every 12 (twelve) hours    CAD (coronary artery disease)  -     ticagrelor (BRILINTA) 90 MG; Take 1 tablet (90 mg total) by mouth every 12 (twelve) hours  -     Basic metabolic panel; Future    Hx of right coronary artery stent placement    Presence of drug-eluting stent in left circumflex coronary artery    S/P drug eluting coronary stent placement  -     ticagrelor (BRILINTA) 90 MG; Take 1 tablet (90 mg total) by mouth every 12 (twelve) hours  -     nitroglycerin (NITROSTAT) 0 4 mg SL tablet; Place 1 tablet (0 4 mg total) under the tongue every 5 (five) minutes as needed for chest pain  -     CBC and Platelet; Future    Tobacco abuse    PAD (peripheral artery disease) ([facility])    Pure hypercholesterolemia  -     Lipid panel; Future  -     TSH, 3rd generation with Free T4 reflex; Future  -     Hemoglobin A1C; Future    Other orders  -     Cancel: TSH, 3rd generation with Free T4 reflex; Future  -     Cancel: HEMOGLOBIN A1C W/ EAG ESTIMATION; Future          Past Medical History:   Diagnosis Date    Hypertension        Review of Systems   Constitutional: Negative for chills  Cardiovascular: Negative for chest pain, claudication, cyanosis, dyspnea on exertion, irregular heartbeat, leg swelling, near-syncope, orthopnea, palpitations, paroxysmal nocturnal dyspnea and syncope  Respiratory: Negative for cough and shortness of breath  Gastrointestinal: Negative for heartburn and nausea  Neurological: Negative for dizziness, focal weakness, headaches, light-headedness and weakness  All other systems reviewed and are negative  No Known Allergies        Current Outpatient Medications:     amLODIPine (NORVASC) 10 mg tablet, Take 1 tablet (10 mg total) by mouth daily, Disp: 90 tablet, Rfl: 0    aspirin 81 mg chewable tablet, Chew 1 tablet (81 mg total) daily, Disp: 30 tablet, Rfl: 0    atorvastatin (LIPITOR) 40 mg tablet, Take 1 tablet (40 mg total) by mouth daily with dinner, Disp: 90 tablet, Rfl: 3    lisinopril (ZESTRIL) 20 mg tablet, TAKE 1 TABLET BY MOUTH EVERY DAY, Disp: 90 tablet, Rfl: 0    metoprolol tartrate (LOPRESSOR) 50 mg tablet, Take 1 tablet (50 mg total) by mouth every 12 (twelve) hours, Disp: 180 tablet, Rfl: 3    nitroglycerin (NITROSTAT) 0 4 mg SL tablet, Place 1 tablet (0 4 mg total) under the tongue every 5 (five) minutes as needed for chest pain, Disp: 24 tablet, Rfl: 1    ticagrelor (BRILINTA) 90 MG, Take 1 tablet (90 mg total) by mouth every 12 (twelve) hours, Disp: 180 tablet, Rfl: 3        Social History     Socioeconomic History    Marital status: Single     Spouse name: Not on file    Number of children: Not on file    Years of education: Not on file    Highest education level: Not on file   Occupational History    Not on file   Tobacco Use    Smoking status: Heavy Tobacco Smoker     Packs/day: 0 50     Types: Cigarettes     Start date: 3/11/1975    Smokeless tobacco: Never Used   Vaping Use    Vaping Use: Never used   Substance and Sexual Activity    Alcohol use: Yes     Comment: ocassionally    Drug use: Never    Sexual activity: Not on file   Other Topics Concern    Not on file   Social History Narrative    Not on file     Social Determinants of Health     Financial Resource Strain: Not on file   Food Insecurity: Not on file   Transportation Needs: Not on file   Physical Activity: Not on file   Stress: Not on file   Social Connections: Not on file   Intimate Partner Violence: Not on file   Housing Stability: Not on file       Family History   Problem Relation Age of Onset    Stomach cancer Mother     No Known Problems Father        Physical Exam  Vitals and nursing note reviewed     Constitutional: General: He is not in acute distress  HENT:      Head: Normocephalic and atraumatic  Eyes:      Conjunctiva/sclera: Conjunctivae normal    Cardiovascular:      Rate and Rhythm: Normal rate and regular rhythm  Pulses: Intact distal pulses  Heart sounds: Normal heart sounds  Pulmonary:      Effort: Pulmonary effort is normal       Breath sounds: Normal breath sounds  Abdominal:      General: Bowel sounds are normal       Palpations: Abdomen is soft  Musculoskeletal:         General: Normal range of motion  Cervical back: Normal range of motion and neck supple  Skin:     General: Skin is warm and dry  Neurological:      Mental Status: He is alert and oriented to person, place, and time  Vitals: Blood pressure 162/91, pulse 73, height 5' 10" (1 778 m), weight 77 3 kg (170 lb 6 4 oz), SpO2 96 %  Wt Readings from Last 3 Encounters:   07/18/22 77 3 kg (170 lb 6 4 oz)   06/30/22 77 1 kg (170 lb)   06/28/22 78 9 kg (174 lb)         Labs & Results:  Lab Results   Component Value Date    WBC 11 27 (H) 06/29/2022    HGB 14 3 06/29/2022    HCT 43 9 06/29/2022    MCV 89 06/29/2022     06/29/2022     No results found for: BNP  No components found for: CHEM  No results found for: CKTOTAL, TROPONINI, TROPONINT, CKMBINDEX  No results found for this or any previous visit  No results found for this or any previous visit  This note was completed in part utilizing [employer] direct voice recognition software  Grammatical errors, random word insertion, spelling mistakes, and incomplete sentences may be an occasional consequence of the system secondary to software limitations, ambient noise and hardware issues  At the time of dictation, efforts were made to edit, clarify and /or correct errors  Please read the chart carefully and recognize, using context, where substitutions have occurred    If you have any questions or concerns about the context, text or information contained within the body of this dictation, please contact myself, the provider, for further clarification

## 2022-07-26 ENCOUNTER — HOSPITAL ENCOUNTER (INPATIENT)
Facility: HOSPITAL | Age: 72
LOS: 2 days | Discharge: HOME | DRG: 280 | End: 2022-07-28
Attending: EMERGENCY MEDICINE | Admitting: INTERNAL MEDICINE
Payer: COMMERCIAL

## 2022-07-26 ENCOUNTER — APPOINTMENT (EMERGENCY)
Dept: RADIOLOGY | Facility: HOSPITAL | Age: 72
DRG: 280 | End: 2022-07-26
Payer: COMMERCIAL

## 2022-07-26 DIAGNOSIS — I43 CARDIOMYOPATHY DUE TO COVID-19 VIRUS (HCC): Primary | ICD-10-CM

## 2022-07-26 DIAGNOSIS — R42 DIZZINESS: ICD-10-CM

## 2022-07-26 DIAGNOSIS — U07.1 CARDIOMYOPATHY DUE TO COVID-19 VIRUS (HCC): Primary | ICD-10-CM

## 2022-07-26 DIAGNOSIS — R77.8 ELEVATED TROPONIN LEVEL NOT DUE MYOCARDIAL INFARCTION: ICD-10-CM

## 2022-07-26 LAB
2HR DELTA HS TROPONIN: 12 NG/L
ALBUMIN SERPL BCP-MCNC: 3.8 G/DL (ref 3.5–5)
ALP SERPL-CCNC: 81 U/L (ref 34–104)
ALT SERPL W P-5'-P-CCNC: 21 U/L (ref 7–52)
ANION GAP SERPL CALCULATED.3IONS-SCNC: 6 MMOL/L (ref 4–13)
AST SERPL W P-5'-P-CCNC: 21 U/L (ref 13–39)
BACTERIA UR QL AUTO: NORMAL /HPF
BASOPHILS # BLD AUTO: 0.04 THOUSANDS/ΜL (ref 0–0.1)
BASOPHILS NFR BLD AUTO: 1 % (ref 0–1)
BILIRUB SERPL-MCNC: 0.48 MG/DL (ref 0.2–1)
BILIRUB UR QL STRIP: NEGATIVE
BNP SERPL-MCNC: 139 PG/ML (ref 0–100)
BUN SERPL-MCNC: 22 MG/DL (ref 5–25)
CALCIUM SERPL-MCNC: 8.6 MG/DL (ref 8.4–10.2)
CARDIAC TROPONIN I PNL SERPL HS: 77 NG/L
CARDIAC TROPONIN I PNL SERPL HS: 89 NG/L
CHLORIDE SERPL-SCNC: 112 MMOL/L (ref 96–108)
CLARITY UR: CLEAR
CO2 SERPL-SCNC: 24 MMOL/L (ref 21–32)
COLOR UR: ABNORMAL
CREAT SERPL-MCNC: 1.09 MG/DL (ref 0.6–1.3)
CRP SERPL QL: 3.6 MG/L
EOSINOPHIL # BLD AUTO: 0.24 THOUSAND/ΜL (ref 0–0.61)
EOSINOPHIL NFR BLD AUTO: 3 % (ref 0–6)
ERYTHROCYTE [DISTWIDTH] IN BLOOD BY AUTOMATED COUNT: 15.8 % (ref 11.6–15.1)
FLUAV RNA RESP QL NAA+PROBE: NEGATIVE
FLUBV RNA RESP QL NAA+PROBE: NEGATIVE
GFR SERPL CREATININE-BSD FRML MDRD: 66 ML/MIN/1.73SQ M
GLUCOSE SERPL-MCNC: 108 MG/DL (ref 65–140)
GLUCOSE UR STRIP-MCNC: NEGATIVE MG/DL
HCT VFR BLD AUTO: 45.4 % (ref 36.5–49.3)
HGB BLD-MCNC: 14.8 G/DL (ref 12–17)
HGB UR QL STRIP.AUTO: NEGATIVE
IMM GRANULOCYTES # BLD AUTO: 0.02 THOUSAND/UL (ref 0–0.2)
IMM GRANULOCYTES NFR BLD AUTO: 0 % (ref 0–2)
KETONES UR STRIP-MCNC: NEGATIVE MG/DL
LEUKOCYTE ESTERASE UR QL STRIP: NEGATIVE
LYMPHOCYTES # BLD AUTO: 2.45 THOUSANDS/ΜL (ref 0.6–4.47)
LYMPHOCYTES NFR BLD AUTO: 33 % (ref 14–44)
MCH RBC QN AUTO: 29.2 PG (ref 26.8–34.3)
MCHC RBC AUTO-ENTMCNC: 32.6 G/DL (ref 31.4–37.4)
MCV RBC AUTO: 90 FL (ref 82–98)
MONOCYTES # BLD AUTO: 1.02 THOUSAND/ΜL (ref 0.17–1.22)
MONOCYTES NFR BLD AUTO: 14 % (ref 4–12)
NEUTROPHILS # BLD AUTO: 3.77 THOUSANDS/ΜL (ref 1.85–7.62)
NEUTS SEG NFR BLD AUTO: 49 % (ref 43–75)
NITRITE UR QL STRIP: NEGATIVE
NON-SQ EPI CELLS URNS QL MICRO: NORMAL /HPF
NRBC BLD AUTO-RTO: 0 /100 WBCS
PH UR STRIP.AUTO: 6 [PH]
PLATELET # BLD AUTO: 146 THOUSANDS/UL (ref 149–390)
PMV BLD AUTO: 11.7 FL (ref 8.9–12.7)
POTASSIUM SERPL-SCNC: 3.8 MMOL/L (ref 3.5–5.3)
PROT SERPL-MCNC: 6.6 G/DL (ref 6.4–8.4)
PROT UR STRIP-MCNC: ABNORMAL MG/DL
RBC # BLD AUTO: 5.07 MILLION/UL (ref 3.88–5.62)
RBC #/AREA URNS AUTO: NORMAL /HPF
RSV RNA RESP QL NAA+PROBE: NEGATIVE
SARS-COV-2 RNA RESP QL NAA+PROBE: POSITIVE
SODIUM SERPL-SCNC: 142 MMOL/L (ref 135–147)
SP GR UR STRIP.AUTO: 1.02 (ref 1–1.03)
UROBILINOGEN UR STRIP-ACNC: <2 MG/DL
WBC # BLD AUTO: 7.54 THOUSAND/UL (ref 4.31–10.16)
WBC #/AREA URNS AUTO: NORMAL /HPF

## 2022-07-26 PROCEDURE — 80053 COMPREHEN METABOLIC PANEL: CPT

## 2022-07-26 PROCEDURE — 84443 ASSAY THYROID STIM HORMONE: CPT | Performed by: CHIROPRACTOR

## 2022-07-26 PROCEDURE — 83735 ASSAY OF MAGNESIUM: CPT | Performed by: CHIROPRACTOR

## 2022-07-26 PROCEDURE — 85025 COMPLETE CBC W/AUTO DIFF WBC: CPT

## 2022-07-26 PROCEDURE — 85379 FIBRIN DEGRADATION QUANT: CPT | Performed by: CHIROPRACTOR

## 2022-07-26 PROCEDURE — 82728 ASSAY OF FERRITIN: CPT | Performed by: CHIROPRACTOR

## 2022-07-26 PROCEDURE — 99285 EMERGENCY DEPT VISIT HI MDM: CPT | Performed by: EMERGENCY MEDICINE

## 2022-07-26 PROCEDURE — 93005 ELECTROCARDIOGRAM TRACING: CPT

## 2022-07-26 PROCEDURE — 84484 ASSAY OF TROPONIN QUANT: CPT

## 2022-07-26 PROCEDURE — 36415 COLL VENOUS BLD VENIPUNCTURE: CPT

## 2022-07-26 PROCEDURE — 81001 URINALYSIS AUTO W/SCOPE: CPT

## 2022-07-26 PROCEDURE — 71045 X-RAY EXAM CHEST 1 VIEW: CPT

## 2022-07-26 PROCEDURE — 84100 ASSAY OF PHOSPHORUS: CPT | Performed by: CHIROPRACTOR

## 2022-07-26 PROCEDURE — 0241U HB NFCT DS VIR RESP RNA 4 TRGT: CPT

## 2022-07-26 PROCEDURE — 83880 ASSAY OF NATRIURETIC PEPTIDE: CPT

## 2022-07-26 PROCEDURE — 82550 ASSAY OF CK (CPK): CPT | Performed by: CHIROPRACTOR

## 2022-07-26 PROCEDURE — 99223 1ST HOSP IP/OBS HIGH 75: CPT | Performed by: INTERNAL MEDICINE

## 2022-07-26 PROCEDURE — 86140 C-REACTIVE PROTEIN: CPT | Performed by: CHIROPRACTOR

## 2022-07-26 PROCEDURE — 99285 EMERGENCY DEPT VISIT HI MDM: CPT

## 2022-07-26 RX ORDER — AMLODIPINE BESYLATE 10 MG/1
10 TABLET ORAL DAILY
Status: DISCONTINUED | OUTPATIENT
Start: 2022-07-27 | End: 2022-07-28 | Stop reason: HOSPADM

## 2022-07-26 RX ORDER — LISINOPRIL 20 MG/1
20 TABLET ORAL DAILY
Status: DISCONTINUED | OUTPATIENT
Start: 2022-07-27 | End: 2022-07-27

## 2022-07-26 RX ORDER — ASPIRIN 81 MG/1
81 TABLET, CHEWABLE ORAL DAILY
Status: DISCONTINUED | OUTPATIENT
Start: 2022-07-27 | End: 2022-07-28 | Stop reason: HOSPADM

## 2022-07-26 RX ORDER — ASPIRIN 81 MG/1
324 TABLET, CHEWABLE ORAL ONCE
Status: COMPLETED | OUTPATIENT
Start: 2022-07-26 | End: 2022-07-26

## 2022-07-26 RX ORDER — ATORVASTATIN CALCIUM 40 MG/1
40 TABLET, FILM COATED ORAL
Status: DISCONTINUED | OUTPATIENT
Start: 2022-07-27 | End: 2022-07-28 | Stop reason: HOSPADM

## 2022-07-26 RX ORDER — METOPROLOL TARTRATE 50 MG/1
50 TABLET, FILM COATED ORAL EVERY 12 HOURS SCHEDULED
Status: DISCONTINUED | OUTPATIENT
Start: 2022-07-26 | End: 2022-07-27

## 2022-07-26 RX ADMIN — TICAGRELOR 90 MG: 90 TABLET ORAL at 22:56

## 2022-07-26 RX ADMIN — ASPIRIN 81 MG CHEWABLE TABLET 324 MG: 81 TABLET CHEWABLE at 20:37

## 2022-07-26 NOTE — ED PROVIDER NOTES
History  Chief Complaint   Patient presents with    Dizziness     Pt s/p stent placement 2 weeks ago  States today he has having dizziness and abdominal discomfort / nausea  Denies CP or SOB   66 yo male with PMH of recent heart catheterization last month, HT, dyslipidemia, and current everyday smoker presents with 5 days of dizziness  He states the symptoms started when his cardiologist changed his metoprolol from 25mg BID to 50mg BID  He states his symptoms start about 30-60 minutes after taking these medications and last for several hours  His dizziness is also associated with SOB, nausea, headaches, weakness, abdominal discomfort, and diarrhea  He denies CP, fever/chills, numbness/tingling  History provided by:  Patient   used: No    Dizziness  Quality:  Lightheadedness  Severity:  Moderate  Onset quality:  Sudden  Duration:  5 days  Timing:  Intermittent  Progression:  Unchanged  Chronicity:  New  Context: medication    Relieved by:  Nothing  Worsened by: Movement  Ineffective treatments:  None tried  Associated symptoms: diarrhea, headaches, nausea, shortness of breath and weakness    Associated symptoms: no chest pain, no palpitations, no syncope, no tinnitus, no vision changes and no vomiting    Diarrhea:     Severity:  Mild    Timing:  Sporadic  Headaches:     Severity:  Mild    Timing:  Intermittent    Progression:  Unchanged  Nausea:     Severity:  Mild    Timing:  Intermittent  Shortness of breath:     Severity:  Moderate    Progression:  Worsening  Weakness:     Severity:  Mild    Timing:  Intermittent  Risk factors: new medications    Risk factors comment:  Chnaged dose of metoprolol from 25mg BID to 50mg BID      Prior to Admission Medications   Prescriptions Last Dose Informant Patient Reported? Taking?    amLODIPine (NORVASC) 10 mg tablet  Self No No   Sig: Take 1 tablet (10 mg total) by mouth daily   aspirin 81 mg chewable tablet  Self No No   Sig: Chew 1 tablet (81 mg total) daily   atorvastatin (LIPITOR) 40 mg tablet   No No   Sig: Take 1 tablet (40 mg total) by mouth daily with dinner   lisinopril (ZESTRIL) 20 mg tablet  Self No No   Sig: TAKE 1 TABLET BY MOUTH EVERY DAY   metoprolol tartrate (LOPRESSOR) 50 mg tablet   No No   Sig: Take 1 tablet (50 mg total) by mouth every 12 (twelve) hours   nitroglycerin (NITROSTAT) 0 4 mg SL tablet   No No   Sig: Place 1 tablet (0 4 mg total) under the tongue every 5 (five) minutes as needed for chest pain   ticagrelor (BRILINTA) 90 MG   No No   Sig: Take 1 tablet (90 mg total) by mouth every 12 (twelve) hours      Facility-Administered Medications: None       Past Medical History:   Diagnosis Date    Hypertension        Past Surgical History:   Procedure Laterality Date    ARTERY SURGERY  2018    CARDIAC CATHETERIZATION N/A 6/28/2022    Procedure: CARDIAC CATHETERIZATION;  Surgeon: Loretta Guzman MD;  [facility]; Service: Cardiology    CARDIAC CATHETERIZATION N/A 6/28/2022    Procedure: Cardiac pci;  Surgeon: Loretta Guzman MD;  [facility]; Service: Cardiology       Family History   Problem Relation Age of Onset    Stomach cancer Mother     No Known Problems Father      I have reviewed and agree with the history as documented  E-Cigarette/Vaping    E-Cigarette Use Never User      E-Cigarette/Vaping Substances    Nicotine No     THC No     CBD No     Flavoring No     Other No     Unknown No      Social History     Tobacco Use    Smoking status: Heavy Tobacco Smoker     Packs/day: 0 50     Types: Cigarettes     Start date: 3/11/1975    Smokeless tobacco: Never Used   Vaping Use    Vaping Use: Never used   Substance Use Topics    Alcohol use: Yes     Comment: ocassionally    Drug use: Never        Review of Systems   Constitutional: Negative for chills and fever  HENT: Negative for congestion, rhinorrhea and tinnitus  Eyes: Negative for visual disturbance     Respiratory: Positive for shortness of breath  Negative for cough  Cardiovascular: Negative for chest pain, palpitations and syncope  Gastrointestinal: Positive for diarrhea and nausea  Negative for abdominal pain, constipation and vomiting  Genitourinary: Negative for dysuria and hematuria  Musculoskeletal: Negative for arthralgias  Skin: Negative for color change and rash  Neurological: Positive for dizziness, weakness and headaches  Negative for numbness  All other systems reviewed and are negative  Physical Exam  ED Triage Vitals   Temperature Pulse Respirations Blood Pressure SpO2   07/26/22 1750 07/26/22 1750 07/26/22 1750 07/26/22 1751 07/26/22 1750   98 2 °F (36 8 °C) 60 18 (!) 197/93 98 %      Temp Source Heart Rate Source Patient Position - Orthostatic VS BP Location FiO2 (%)   07/26/22 1750 07/26/22 1750 07/26/22 1750 07/26/22 1750 --   Oral Monitor Lying Right arm       Pain Score       07/26/22 1905       No Pain             Orthostatic Vital Signs  Vitals:    07/26/22 1930 07/26/22 2100 07/26/22 2200 07/26/22 2251   BP: (!) 188/84 (!) 184/87 (!) 191/80 169/80   Pulse: 57 71  (!) 51   Patient Position - Orthostatic VS: Sitting Sitting  Sitting       Physical Exam  Vitals and nursing note reviewed  Constitutional:       Appearance: He is well-developed  HENT:      Head: Normocephalic and atraumatic  Right Ear: External ear normal       Left Ear: External ear normal       Nose: Nose normal       Mouth/Throat:      Mouth: Mucous membranes are moist    Eyes:      Conjunctiva/sclera: Conjunctivae normal    Cardiovascular:      Rate and Rhythm: Normal rate and regular rhythm  Pulses: Normal pulses  Heart sounds: Normal heart sounds  No murmur heard  Pulmonary:      Effort: Pulmonary effort is normal  No respiratory distress  Breath sounds: Normal breath sounds  Abdominal:      Palpations: Abdomen is soft  Tenderness: There is no abdominal tenderness     Musculoskeletal: Cervical back: Neck supple  Skin:     General: Skin is warm and dry  Capillary Refill: Capillary refill takes less than 2 seconds  Neurological:      Mental Status: He is alert and oriented to person, place, and time  Psychiatric:         Mood and Affect: Mood normal          ED Medications  Medications   amLODIPine (NORVASC) tablet 10 mg (has no administration in time range)   aspirin chewable tablet 81 mg (has no administration in time range)   atorvastatin (LIPITOR) tablet 40 mg (has no administration in time range)   lisinopril (ZESTRIL) tablet 20 mg (has no administration in time range)   metoprolol tartrate (LOPRESSOR) tablet 50 mg (50 mg Oral Not Given 7/26/22 2251)   ticagrelor (BRILINTA) tablet 90 mg (has no administration in time range)   aspirin chewable tablet 324 mg (324 mg Oral Given 7/26/22 2037)       Diagnostic Studies  Results Reviewed     Procedure Component Value Units Date/Time    Ferritin [[id number]] Collected: 07/26/22 1911    Lab Status: In process Specimen: Blood from Arm, Right Updated: 07/26/22 2254    C-reactive protein [[id number]] Collected: 07/26/22 1911    Lab Status: In process Specimen: Blood from Arm, Right Updated: 07/26/22 2254    HS Troponin I 2hr [[id number]]  (Abnormal) Collected: 07/26/22 2108    Lab Status: Final result Specimen: Blood from Arm, Right Updated: 07/26/22 2140     hs TnI 2hr 89 ng/L      Delta 2hr hsTnI 12 ng/L     FLU/RSV/COVID - if FLU/RSV clinically relevant [[id number]]  (Abnormal) Collected: 07/26/22 1913    Lab Status: Final result Specimen: Nares from Nose Updated: 07/26/22 2125     SARS-CoV-2 Positive     INFLUENZA A PCR Negative     INFLUENZA B PCR Negative     RSV PCR Negative    Narrative:      FOR PEDIATRIC PATIENTS - copy/paste COVID Guidelines URL to browser: [email or web address]    SARS-CoV-2 assay is a Nucleic Acid Amplification assay intended for the  qualitative detection of nucleic acid from SARS-CoV-2 in nasopharyngeal  swabs  Results are for the presumptive identification of SARS-CoV-2 RNA  Positive results are indicative of infection with SARS-CoV-2, the virus  causing COVID-19, but do not rule out bacterial infection or co-infection  with other viruses  Laboratories within the [place] and its  territories are required to report all positive results to the appropriate  public health authorities  Negative results do not preclude SARS-CoV-2  infection and should not be used as the sole basis for treatment or other  patient management decisions  Negative results must be combined with  clinical observations, patient history, and epidemiological information  This test has not been FDA cleared or approved  This test has been authorized by FDA under an Emergency Use Authorization  (EUA)  This test is only authorized for the duration of time the  declaration that circumstances exist justifying the authorization of the  emergency use of an in vitro diagnostic tests for detection of SARS-CoV-2  virus and/or diagnosis of COVID-19 infection under section 564(b)(1) of  the Act, 21 U  S C  754MJG-8(X)(1), unless the authorization is terminated  or revoked sooner  The test has been validated but independent review by FDA  and CLIA is pending  Test performed using [employer] Gene[employer]pert: This RT-PCR assay targets N2,  a region unique to SARS-CoV-2  A conserved region in the E-gene was chosen  for pan-Sarbecovirus detection which includes SARS-CoV-2      HS Troponin I 4hr [[id number]]     Lab Status: No result Specimen: Blood     B-Type Natriuretic Peptide(BNP) [facility], [place], [place] [facility] Only [[id number]]  (Abnormal) Collected: 07/26/22 1911    Lab Status: Final result Specimen: Blood from Arm, Right Updated: 07/26/22 1952      pg/mL     HS Troponin 0hr (reflex protocol) [[id number]]  (Abnormal) Collected: 07/26/22 1911    Lab Status: Final result Specimen: Blood from Arm, Right Updated: 07/26/22 1952     hs TnI 0hr 77 ng/L     Comprehensive metabolic panel [[id number]]  (Abnormal) Collected: 07/26/22 1911    Lab Status: Final result Specimen: Blood from Arm, Right Updated: 07/26/22 1951     Sodium 142 mmol/L      Potassium 3 8 mmol/L      Chloride 112 mmol/L      CO2 24 mmol/L      ANION GAP 6 mmol/L      BUN 22 mg/dL      Creatinine 1 09 mg/dL      Glucose 108 mg/dL      Calcium 8 6 mg/dL      AST 21 U/L      ALT 21 U/L      Alkaline Phosphatase 81 U/L      Total Protein 6 6 g/dL      Albumin 3 8 g/dL      Total Bilirubin 0 48 mg/dL      eGFR 66 ml/min/1 73sq m     Narrative:      [place] guidelines for Chronic Kidney Disease (CKD):     Stage 1 with normal or high GFR (GFR > 90 mL/min/1 73 square meters)    Stage 2 Mild CKD (GFR = 60-89 mL/min/1 73 square meters)    Stage 3A Moderate CKD (GFR = 45-59 mL/min/1 73 square meters)    Stage 3B Moderate CKD (GFR = 30-44 mL/min/1 73 square meters)    Stage 4 Severe CKD (GFR = 15-29 mL/min/1 73 square meters)    Stage 5 End Stage CKD (GFR <15 mL/min/1 73 square meters)  Note: GFR calculation is accurate only with a steady state creatinine    Urine Microscopic [[id number]]  (Normal) Collected: 07/26/22 1913    Lab Status: Final result Specimen: Urine, Clean Catch Updated: 07/26/22 1934     RBC, UA None Seen /hpf      WBC, UA None Seen /hpf      Epithelial Cells None Seen /hpf      Bacteria, UA None Seen /hpf     UA w Reflex to Microscopic w Reflex to Culture [[id number]]  (Abnormal) Collected: 07/26/22 1913    Lab Status: Final result Specimen: Urine, Clean Catch Updated: 07/26/22 1932     Color, UA Light Yellow     Clarity, UA Clear     Specific Gravity, UA 1 017     pH, UA 6 0     Leukocytes, UA Negative     Nitrite, UA Negative     Protein, UA Trace mg/dl      Glucose, UA Negative mg/dl      Ketones, UA Negative mg/dl      Urobilinogen, UA <2 0 mg/dl      Bilirubin, UA Negative     Occult Blood, UA Negative    CBC and differential [[id number]]  (Abnormal) Collected: 07/26/22 1911    Lab Status: Final result Specimen: Blood from Arm, Right Updated: 07/26/22 1928     WBC 7 54 Thousand/uL      RBC 5 07 Million/uL      Hemoglobin 14 8 g/dL      Hematocrit 45 4 %      MCV 90 fL      MCH 29 2 pg      MCHC 32 6 g/dL      RDW 15 8 %      MPV 11 7 fL      Platelets 968 Thousands/uL      nRBC 0 /100 WBCs      Neutrophils Relative 49 %      Immat GRANS % 0 %      Lymphocytes Relative 33 %      Monocytes Relative 14 %      Eosinophils Relative 3 %      Basophils Relative 1 %      Neutrophils Absolute 3 77 Thousands/µL      Immature Grans Absolute 0 02 Thousand/uL      Lymphocytes Absolute 2 45 Thousands/µL      Monocytes Absolute 1 02 Thousand/µL      Eosinophils Absolute 0 24 Thousand/µL      Basophils Absolute 0 04 Thousands/µL                  XR chest 1 view portable    (Results Pending)         Procedures  Procedures      ED Course  ED Course as of 07/26/22 2254 Tue Jul 26, 2022 1859 BP continually elevated, cycled it twice while speaking with patient and was in the 592M systolic  Will closely monitor for signs of end organ damage  1952 UA w Reflex to Microscopic w Reflex to Culture(!)  Wnl except trace protein   1952 Comprehensive metabolic panel(!)  Wnl except Cl slgihtly elevated   2016 , Troponin elevated to 77 will trend at 2 hours   2126 FLU/RSV/COVID - if FLU/RSV clinically relevant(!)  COVID positive   2130 Due to elevated trop and COVID postive, suspected cardiomyopathy due to [place]  Will consult cardiology and admit to [address] [place] [address]  2155 Spoke with Cards, they doubt Metoprolol for his symptoms due to lack of hypotension and significant bradycardia  No signs of CHF  Recommended [facility] to [facility]    2156 [facility] accepted patient, admitted to Dr Wells Julee     2159 HS Troponin I 2hr(!)  Delta Trop 12       MDM  Number of Diagnoses or Management Options  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 [facility])  Dizziness  Diagnosis management comments: DDx including but not limited to: metabolic abnormality, cardiac abnormality, thyroid disease, intracranial process, adverse reaction; infectious process  Amount and/or Complexity of Data Reviewed  Clinical lab tests: ordered and reviewed  Tests in the radiology section of CPT®: ordered and reviewed  Tests in the medicine section of CPT®: ordered and reviewed  Decide to obtain previous medical records or to obtain history from someone other than the patient: yes  Review and summarize past medical records: yes  Discuss the patient with other providers: yes  Independent visualization of images, tracings, or specimens: yes    Risk of Complications, Morbidity, and/or Mortality  Presenting problems: high  Diagnostic procedures: moderate  Management options: moderate    Patient Progress  Patient progress: stable      Disposition  Final diagnoses:   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 ([facility] [address] )   Dizziness     Time reflects when diagnosis was documented in both MDM as applicable and the Disposition within this note     Time User Action Codes Description Comment    7/26/2022  9:53 PM [employer] Add [U07 1,  I43] 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 [facility])     7/26/2022  9:54 PM [employer] Add [R42] Dizziness       ED Disposition     ED Disposition   Admit    Condition   Stable    Date/Time   Tue Jul 26, 2022  9:54 PM    Comment   Case was discussed with [facility] resident and the patient's admission status was agreed to be Admission Status: inpatient status to the service of Dr Chano Winkler  Follow-up Information    None         Patient's Medications   Discharge Prescriptions    No medications on file     No discharge procedures on file  PDMP Review     None           ED Provider  Attending physically available and evaluated Jensen Gordon  [facility] managed the patient along with the ED Attending      Electronically Signed by         Ivis Ward DO  07/26/22 0307

## 2022-07-27 ENCOUNTER — TELEPHONE (OUTPATIENT)
Dept: INTERNAL MEDICINE CLINIC | Facility: CLINIC | Age: 72
End: 2022-07-27

## 2022-07-27 ENCOUNTER — APPOINTMENT (INPATIENT)
Dept: NON INVASIVE DIAGNOSTICS | Facility: HOSPITAL | Age: 72
DRG: 280 | End: 2022-07-27
Payer: COMMERCIAL

## 2022-07-27 PROBLEM — E78.5 HYPERLIPEMIA: Status: ACTIVE | Noted: 2022-07-27

## 2022-07-27 LAB
4HR DELTA HS TROPONIN: 42 NG/L
ALBUMIN SERPL BCP-MCNC: 3.8 G/DL (ref 3.5–5)
ALP SERPL-CCNC: 79 U/L (ref 34–104)
ALT SERPL W P-5'-P-CCNC: 20 U/L (ref 7–52)
ANION GAP SERPL CALCULATED.3IONS-SCNC: 6 MMOL/L (ref 4–13)
AORTIC VALVE MEAN VELOCITY: 11.2 M/S
APICAL FOUR CHAMBER EJECTION FRACTION: 65 %
ASCENDING AORTA: 3.9 CM
AST SERPL W P-5'-P-CCNC: 19 U/L (ref 13–39)
ATRIAL RATE: 58 BPM
AV LVOT MEAN GRADIENT: 2 MMHG
AV LVOT PEAK GRADIENT: 4 MMHG
AV MEAN GRADIENT: 6 MMHG
AV PEAK GRADIENT: 14 MMHG
AV REGURGITATION PRESSURE HALF TIME: 511 MS
AV VELOCITY RATIO: 0.5
BASOPHILS # BLD AUTO: 0.04 THOUSANDS/ΜL (ref 0–0.1)
BASOPHILS NFR BLD AUTO: 1 % (ref 0–1)
BILIRUB SERPL-MCNC: 0.74 MG/DL (ref 0.2–1)
BUN SERPL-MCNC: 19 MG/DL (ref 5–25)
CALCIUM SERPL-MCNC: 8.8 MG/DL (ref 8.4–10.2)
CARDIAC TROPONIN I PNL SERPL HS: 119 NG/L
CHLORIDE SERPL-SCNC: 110 MMOL/L (ref 96–108)
CK SERPL-CCNC: 46 U/L (ref 39–308)
CO2 SERPL-SCNC: 23 MMOL/L (ref 21–32)
CREAT SERPL-MCNC: 0.84 MG/DL (ref 0.6–1.3)
D DIMER PPP FEU-MCNC: 1.52 UG/ML FEU
DOP CALC AO PEAK VEL: 1.87 M/S
DOP CALC AO VTI: 42.15 CM
DOP CALC LVOT PEAK VEL VTI: 24.89 CM
DOP CALC LVOT PEAK VEL: 0.94 M/S
E WAVE DECELERATION TIME: 215 MS
EOSINOPHIL # BLD AUTO: 0.17 THOUSAND/ΜL (ref 0–0.61)
EOSINOPHIL NFR BLD AUTO: 2 % (ref 0–6)
ERYTHROCYTE [DISTWIDTH] IN BLOOD BY AUTOMATED COUNT: 15.5 % (ref 11.6–15.1)
FERRITIN SERPL-MCNC: 42 NG/ML (ref 8–388)
GFR SERPL CREATININE-BSD FRML MDRD: 86 ML/MIN/1.73SQ M
GLUCOSE SERPL-MCNC: 98 MG/DL (ref 65–140)
HCT VFR BLD AUTO: 45.5 % (ref 36.5–49.3)
HGB BLD-MCNC: 14.9 G/DL (ref 12–17)
IMM GRANULOCYTES # BLD AUTO: 0.03 THOUSAND/UL (ref 0–0.2)
IMM GRANULOCYTES NFR BLD AUTO: 0 % (ref 0–2)
LYMPHOCYTES # BLD AUTO: 2.11 THOUSANDS/ΜL (ref 0.6–4.47)
LYMPHOCYTES NFR BLD AUTO: 29 % (ref 14–44)
MAGNESIUM SERPL-MCNC: 2.2 MG/DL (ref 1.6–2.6)
MCH RBC QN AUTO: 28.8 PG (ref 26.8–34.3)
MCHC RBC AUTO-ENTMCNC: 32.7 G/DL (ref 31.4–37.4)
MCV RBC AUTO: 88 FL (ref 82–98)
MONOCYTES # BLD AUTO: 1.04 THOUSAND/ΜL (ref 0.17–1.22)
MONOCYTES NFR BLD AUTO: 14 % (ref 4–12)
MV E'TISSUE VEL-SEP: 5 CM/S
MV PEAK A VEL: 0.82 M/S
MV PEAK E VEL: 68 CM/S
MV STENOSIS PRESSURE HALF TIME: 62 MS
MV VALVE AREA P 1/2 METHOD: 3.55 CM2
NEUTROPHILS # BLD AUTO: 3.96 THOUSANDS/ΜL (ref 1.85–7.62)
NEUTS SEG NFR BLD AUTO: 54 % (ref 43–75)
NRBC BLD AUTO-RTO: 0 /100 WBCS
P AXIS: 85 DEGREES
PHOSPHATE SERPL-MCNC: 2.3 MG/DL (ref 2.3–4.1)
PLATELET # BLD AUTO: 159 THOUSANDS/UL (ref 149–390)
PMV BLD AUTO: 11.9 FL (ref 8.9–12.7)
POTASSIUM SERPL-SCNC: 3.5 MMOL/L (ref 3.5–5.3)
PR INTERVAL: 200 MS
PROT SERPL-MCNC: 6.6 G/DL (ref 6.4–8.4)
QRS AXIS: 73 DEGREES
QRSD INTERVAL: 74 MS
QT INTERVAL: 458 MS
QTC INTERVAL: 449 MS
RA PRESSURE ESTIMATED: 5 MMHG
RBC # BLD AUTO: 5.17 MILLION/UL (ref 3.88–5.62)
RV PSP: 33 MMHG
SL CV AV DECELERATION TIME RETROGRADE: 1763 MS
SL CV AV PEAK GRADIENT RETROGRADE: 93 MMHG
SL CV LV EF: 65
SODIUM SERPL-SCNC: 139 MMOL/L (ref 135–147)
T WAVE AXIS: 75 DEGREES
TR MAX PG: 28 MMHG
TR PEAK VELOCITY: 2.7 M/S
TRICUSPID VALVE PEAK REGURGITATION VELOCITY: 2.65 M/S
TSH SERPL DL<=0.05 MIU/L-ACNC: 1.49 UIU/ML (ref 0.45–4.5)
VENTRICULAR RATE: 58 BPM
WBC # BLD AUTO: 7.35 THOUSAND/UL (ref 4.31–10.16)

## 2022-07-27 PROCEDURE — 93308 TTE F-UP OR LMTD: CPT

## 2022-07-27 PROCEDURE — 93321 DOPPLER ECHO F-UP/LMTD STD: CPT

## 2022-07-27 PROCEDURE — 93325 DOPPLER ECHO COLOR FLOW MAPG: CPT

## 2022-07-27 PROCEDURE — 99233 SBSQ HOSP IP/OBS HIGH 50: CPT | Performed by: INTERNAL MEDICINE

## 2022-07-27 PROCEDURE — 93308 TTE F-UP OR LMTD: CPT | Performed by: INTERNAL MEDICINE

## 2022-07-27 PROCEDURE — 93010 ELECTROCARDIOGRAM REPORT: CPT | Performed by: INTERNAL MEDICINE

## 2022-07-27 PROCEDURE — 93321 DOPPLER ECHO F-UP/LMTD STD: CPT | Performed by: INTERNAL MEDICINE

## 2022-07-27 PROCEDURE — 85025 COMPLETE CBC W/AUTO DIFF WBC: CPT | Performed by: CHIROPRACTOR

## 2022-07-27 PROCEDURE — 99223 1ST HOSP IP/OBS HIGH 75: CPT | Performed by: INTERNAL MEDICINE

## 2022-07-27 PROCEDURE — 93325 DOPPLER ECHO COLOR FLOW MAPG: CPT | Performed by: INTERNAL MEDICINE

## 2022-07-27 PROCEDURE — 80053 COMPREHEN METABOLIC PANEL: CPT | Performed by: CHIROPRACTOR

## 2022-07-27 RX ORDER — FUROSEMIDE 10 MG/ML
20 INJECTION INTRAMUSCULAR; INTRAVENOUS ONCE
Status: COMPLETED | OUTPATIENT
Start: 2022-07-27 | End: 2022-07-27

## 2022-07-27 RX ORDER — CARVEDILOL 6.25 MG/1
6.25 TABLET ORAL 2 TIMES DAILY WITH MEALS
Status: DISCONTINUED | OUTPATIENT
Start: 2022-07-27 | End: 2022-07-28

## 2022-07-27 RX ORDER — PANTOPRAZOLE SODIUM 40 MG/1
40 TABLET, DELAYED RELEASE ORAL
Status: DISCONTINUED | OUTPATIENT
Start: 2022-07-27 | End: 2022-07-28 | Stop reason: HOSPADM

## 2022-07-27 RX ORDER — LISINOPRIL 20 MG/1
40 TABLET ORAL DAILY
Status: DISCONTINUED | OUTPATIENT
Start: 2022-07-28 | End: 2022-07-28 | Stop reason: HOSPADM

## 2022-07-27 RX ORDER — FUROSEMIDE 10 MG/ML
40 INJECTION INTRAMUSCULAR; INTRAVENOUS ONCE
Status: DISCONTINUED | OUTPATIENT
Start: 2022-07-27 | End: 2022-07-27

## 2022-07-27 RX ADMIN — CARVEDILOL 6.25 MG: 6.25 TABLET, FILM COATED ORAL at 16:39

## 2022-07-27 RX ADMIN — FUROSEMIDE 20 MG: 10 INJECTION, SOLUTION INTRAMUSCULAR; INTRAVENOUS at 13:55

## 2022-07-27 RX ADMIN — AMLODIPINE BESYLATE 10 MG: 10 TABLET ORAL at 09:27

## 2022-07-27 RX ADMIN — ASPIRIN 81 MG CHEWABLE TABLET 81 MG: 81 TABLET CHEWABLE at 09:27

## 2022-07-27 RX ADMIN — PANTOPRAZOLE SODIUM 40 MG: 40 TABLET, DELAYED RELEASE ORAL at 13:54

## 2022-07-27 RX ADMIN — ATORVASTATIN CALCIUM 40 MG: 40 TABLET, FILM COATED ORAL at 16:39

## 2022-07-27 RX ADMIN — TICAGRELOR 90 MG: 90 TABLET ORAL at 20:57

## 2022-07-27 RX ADMIN — LISINOPRIL 20 MG: 20 TABLET ORAL at 09:27

## 2022-07-27 RX ADMIN — TICAGRELOR 90 MG: 90 TABLET ORAL at 09:27

## 2022-07-27 NOTE — ED NOTES
Per admitting MD Dr Shikha Velez, hold metoprolol tartrate, pt sinus eduardo @ 51 on cardiac monitor        Marco Antonio Melgar RN  07/26/22 7217

## 2022-07-27 NOTE — PLAN OF CARE
Problem: Potential for Falls  Goal: Patient will remain free of falls  Description: INTERVENTIONS:  - Educate patient/family on patient safety including physical limitations  - Instruct patient to call for assistance with activity   - Consult OT/PT to assist with strengthening/mobility   - Keep Call bell within reach  - Keep bed low and locked with side rails adjusted as appropriate  - Keep care items and personal belongings within reach  - Initiate and maintain comfort rounds  - Make Fall Risk Sign visible to staff  - Offer Toileting every 2 Hours, in advance of need  - Initiate/Maintain bed alarm  - Apply yellow socks and bracelet for high fall risk patients  - Consider moving patient to room near nurses station  Outcome: Progressing     Problem: PAIN - ADULT  Goal: Verbalizes/displays adequate comfort level or baseline comfort level  Description: Interventions:  - Encourage patient to monitor pain and request assistance  - Assess pain using appropriate pain scale  - Administer analgesics based on type and severity of pain and evaluate response  - Implement non-pharmacological measures as appropriate and evaluate response  - Consider cultural and social influences on pain and pain management  - Notify physician/advanced practitioner if interventions unsuccessful or patient reports new pain  Outcome: Progressing     Problem: INFECTION - ADULT  Goal: Absence or prevention of progression during hospitalization  Description: INTERVENTIONS:  - Assess and monitor for signs and symptoms of infection  - Monitor lab/diagnostic results  - Monitor all insertion sites, i e  indwelling lines, tubes, and drains  - Monitor endotracheal if appropriate and nasal secretions for changes in amount and color  - [place] appropriate cooling/warming therapies per order  - Administer medications as ordered  - Instruct and encourage patient and family to use good hand hygiene technique  - Identify and instruct in appropriate isolation precautions for identified infection/condition  Outcome: Progressing  Goal: Absence of fever/infection during neutropenic period  Description: INTERVENTIONS:  - Monitor WBC    Outcome: Progressing     Problem: SAFETY ADULT  Goal: Patient will remain free of falls  Description: INTERVENTIONS:  - Educate patient/family on patient safety including physical limitations  - Instruct patient to call for assistance with activity   - Consult OT/PT to assist with strengthening/mobility   - Keep Call bell within reach  - Keep bed low and locked with side rails adjusted as appropriate  - Keep care items and personal belongings within reach  - Initiate and maintain comfort rounds  - Make Fall Risk Sign visible to staff  - Offer Toileting every 2 Hours, in advance of need  - Initiate/Maintain bed alarm  - Apply yellow socks and bracelet for high fall risk patients  - Consider moving patient to room near nurses station  Outcome: Progressing  Goal: Maintain or return to baseline ADL function  Description: INTERVENTIONS:  -  Assess patient's ability to carry out ADLs; assess patient's baseline for ADL function and identify physical deficits which impact ability to perform ADLs (bathing, care of mouth/teeth, toileting, grooming, dressing, etc )  - Assess/evaluate cause of self-care deficits   - Assess range of motion  - Assess patient's mobility; develop plan if impaired  - Assess patient's need for assistive devices and provide as appropriate  - Encourage maximum independence but intervene and supervise when necessary  - Involve family in performance of ADLs  - Assess for home care needs following discharge   - Consider OT consult to assist with ADL evaluation and planning for discharge  - Provide patient education as appropriate  Outcome: Progressing  Goal: Maintains/Returns to pre admission functional level  Description: INTERVENTIONS:  - Perform BMAT or MOVE assessment daily    - Set and communicate daily mobility goal to care team and patient/family/caregiver  - Collaborate with rehabilitation services on mobility goals if consulted  - Perform Range of Motion 3 times a day  - Reposition patient every 2 hours  - Dangle patient 3 times a day  - Stand patient 3 times a day  - Ambulate patient 3 times a day  - Out of bed to chair 3 times a day   - Out of bed for meals 3 times a day  - Out of bed for toileting  - Record patient progress and toleration of activity level   Outcome: Progressing     Problem: DISCHARGE PLANNING  Goal: Discharge to home or other facility with appropriate resources  Description: INTERVENTIONS:  - Identify barriers to discharge w/patient and caregiver  - Arrange for needed discharge resources and transportation as appropriate  - Identify discharge learning needs (meds, wound care, etc )  - Arrange for interpretive services to assist at discharge as needed  - Refer to Case Management Department for coordinating discharge planning if the patient needs post-hospital services based on physician/advanced practitioner order or complex needs related to functional status, cognitive ability, or social support system  Outcome: Progressing     Problem: Knowledge Deficit  Goal: Patient/family/caregiver demonstrates understanding of disease process, treatment plan, medications, and discharge instructions  Description: Complete learning assessment and assess knowledge base    Interventions:  - Provide teaching at level of understanding  - Provide teaching via preferred learning methods  Outcome: Progressing

## 2022-07-27 NOTE — H&P
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 [facility])  Assessment & Plan  Patient with admitting diagnosis after ED consulted Cardiology    Current meds include  Amlodipine 10  Metoprolol tartrate 50 q 12h  Ticagrelor 90 q 12h    Echo pending  Troponins 77>89>119 cardiology notified, no ACS heparin     Mild pathway labs including  CRP 3 6  Ferritin  CK  D-dimer 1 52    COVID-19  Assessment & Plan  Patient presents having tested positive for COVID  According to nursing, patient has family at home who are positive however patient claims his symptoms of episodic weakness, nausea and dizziness exclusively on metoprolol and says has been that way since he was discharged from the hospital the last time  No O2 requirements  Mild pathway  Inflammatory markers and mild pathway labs    Primary hypertension  Assessment & Plan  Continue home meds  Monitor vital signs  Encouraged tobacco cessation    Amlodipine 10 daily  Lisinopril 20 daily  Metoprolol tartrate 50 q 12h late p m  dose held due to bradycardia    NSTEMI (non-ST elevated myocardial infarction) [facility])  Assessment & Plan  Patient is status post discharge for NSTEMI 06/28/22 with cardiac catheterization    Cardiology following    Tobacco abuse  Assessment & Plan  Patient states stop smoking 3 weeks ago without difficulty  Not using nor has any interest in nicotine replacement      VTE Prophylaxis: ticagrelor  / sequential compression device   Code Status:  Level 1  POLST: There is no POLST form on file for this patient (pre-hospital)    Anticipated Length of Stay:  Patient will be admitted on an Inpatient basis with an anticipated length of stay of  more than 2 midnights     Justification for Hospital Stay:  Further medical evaluation and management    Chief Complaint:   Dizziness, nausea, weakness    History of Present Illness:    Savage Veras is a 67 y o  male PMH significant for recent heart catheterization, HTN, HLD, recently stop smoking, with 5 days of dizziness associated with nausea weakness diarrhea and abdominal discomfort  Patient questions COVID diagnosis but per nursing does have positive family members at home when questioning the patient, he tripped resolve his symptoms to his current metoprolol dosing  States all symptoms began approximately 1/2 hour after taking his metoprolol and last for several hours  Review of Systems:    Review of Systems   Constitutional: Negative for chills, diaphoresis and fever  Respiratory: Negative for cough and wheezing  Cardiovascular: Negative for chest pain  Gastrointestinal: Positive for diarrhea and nausea  Negative for vomiting  Neurological: Positive for dizziness and weakness  Negative for headaches  Past Medical and Surgical History:     Past Medical History:   Diagnosis Date    Hypertension        Past Surgical History:   Procedure Laterality Date    ARTERY SURGERY  2018    CARDIAC CATHETERIZATION N/A 6/28/2022    Procedure: CARDIAC CATHETERIZATION;  Surgeon: Rena Gilmore MD;  [facility] CARD[facility]; Service: Cardiology    CARDIAC CATHETERIZATION N/A 6/28/2022    Procedure: Cardiac pci;  Surgeon: Rena Gilmore MD;  [facility] CARD[facility]ATH [facility]; Service: Cardiology       Meds/Allergies:    Prior to Admission medications    Medication Sig Start Date End Date Taking?  Authorizing Provider   amLODIPine (NORVASC) 10 mg tablet Take 1 tablet (10 mg total) by mouth daily 6/30/22   Mitchell Agarwal MD   aspirin 81 mg chewable tablet Chew 1 tablet (81 mg total) daily 6/30/22 7/30/22  Jacklyn Cardona MD   atorvastatin (LIPITOR) 40 mg tablet Take 1 tablet (40 mg total) by mouth daily with dinner 7/18/22 7/13/23  KORTNEY Cruz   lisinopril (ZESTRIL) 20 mg tablet TAKE 1 TABLET BY MOUTH EVERY DAY 6/30/22   Mitchell Agarwal MD   metoprolol tartrate (LOPRESSOR) 50 mg tablet Take 1 tablet (50 mg total) by mouth every 12 (twelve) hours 7/18/22   KORTNEY Schulte   nitroglycerin (NITROSTAT) 0 4 mg SL tablet Place 1 tablet (0 4 mg total) under the tongue every 5 (five) minutes as needed for chest pain 7/18/22   KORTNEY Schulte   ticagrelor (BRILINTA) 90 MG Take 1 tablet (90 mg total) by mouth every 12 (twelve) hours 7/18/22   KORTNEY Schulte     I have reviewed home medications with patient personally  Allergies: No Known Allergies    Social History:     Marital Status: Single   Occupation: Retired  Patient Pre-hospital Living Situation: Lives alone  Patient Pre-hospital Level of Mobility: none  Patient Pre-hospital Diet Restrictions: low sodium cardiac  Substance Use History: quit  cigarettes 3 weeks ago  Social History     Substance and Sexual Activity   Alcohol Use Yes    Comment: ocassionally     Social History     Tobacco Use   Smoking Status Heavy Tobacco Smoker    Packs/day: 0 50    Types: Cigarettes    Start date: 3/11/1975   Smokeless Tobacco Never Used     Social History     Substance and Sexual Activity   Drug Use Never       Family History:    Family History   Problem Relation Age of Onset    Stomach cancer Mother     No Known Problems Father        Physical Exam:     Vitals:   Blood Pressure: 162/77 (07/27/22 0103)  Pulse: (!) 54 (07/27/22 0103)  Temperature: 98 8 °F (37 1 °C) (07/27/22 0103)  Temp Source: Oral (07/27/22 0103)  Respirations: 18 (07/27/22 0103)  Height: 5' 10" (177 8 cm) (07/27/22 0055)  Weight - Scale: 73 5 kg (162 lb 0 6 oz) (07/27/22 0055)  SpO2: 96 % (07/27/22 0103)    Physical Exam  Constitutional:       General: He is not in acute distress  Appearance: He is not ill-appearing or diaphoretic  HENT:      Head: Normocephalic  Nose: No rhinorrhea  Eyes:      General: No scleral icterus       Extraocular Movements: Extraocular movements intact  Cardiovascular:      Rate and Rhythm: Bradycardia present  Heart sounds: No murmur heard  Pulmonary:      Breath sounds: No wheezing or rales  Comments: Decreased chest wall excursion and generally diminished breath sounds  Abdominal:      Palpations: Abdomen is soft  Tenderness: There is no abdominal tenderness  There is no guarding  Musculoskeletal:      Right lower leg: No edema  Left lower leg: No edema  Skin:     General: Skin is warm and dry  Neurological:      General: No focal deficit present  Mental Status: He is alert  Additional Data:     Lab Results: I have personally reviewed pertinent reports  Results from last 7 days   Lab Units 07/26/22  1911   WBC Thousand/uL 7 54   HEMOGLOBIN g/dL 14 8   HEMATOCRIT % 45 4   PLATELETS Thousands/uL 146*   NEUTROS PCT % 49   LYMPHS PCT % 33   MONOS PCT % 14*   EOS PCT % 3     Results from last 7 days   Lab Units 07/26/22 1911   POTASSIUM mmol/L 3 8   CHLORIDE mmol/L 112*   CO2 mmol/L 24   BUN mg/dL 22   CREATININE mg/dL 1 09   CALCIUM mg/dL 8 6   ALK PHOS U/L 81   ALT U/L 21   AST U/L 21           Imaging: I have personally reviewed pertinent reports  CXR done this evening to my read negative for significant pathology, pneumothorax or pleural effusion    XR chest 1 view portable    Result Date: 6/28/2022  Narrative: CHEST INDICATION:   chest pain  COMPARISON:  None EXAM PERFORMED/VIEWS:  XR CHEST PORTABLE FINDINGS: Cardiomediastinal silhouette appears unremarkable  The lungs are clear  No pneumothorax or pleural effusion  Osseous structures appear within normal limits for patient age  Impression: No acute cardiopulmonary disease  Workstation performed: [id number]     Cardiac catheterization    Result Date: 6/28/2022  Narrative: · Prox LAD lesion is 50% stenosed  · Mid Cx lesion is 70% stenosed  · Dist RCA lesion is 90% stenosed  · Mid RCA lesion is 70% stenosed  Significant CAD involving the mid circumflex, mid RCA and distal RCA  A 50% proximal LAD stenosis was not flow-limiting by iFR (iFR=0 92)  Successful IVUS-guided PCI of the mid circumflex (3 0x18mm, distal RCA (2 76o27wc) and mid RCA (3 5x23) (non-overlapping)  Plan: DAPT, statin, smoking cessation, cardiac rehabilitation  Echo complete w/ contrast if indicated    Result Date: 6/28/2022  Narrative: [place]  Left Ventricle: Left ventricular cavity size is normal  Wall thickness is mildly increased  There is mild concentric hypertrophy  The left ventricular ejection fraction is 60%  Systolic function is normal  Global longitudinal strain is reduced at -15%  Diastolic function is mildly abnormal, consistent with grade I (abnormal) relaxation    The following segments are hypokinetic: basal inferior, basal inferolateral, mid inferior and mid inferolateral    All other segments are normal    Left Atrium: The atrium is mildly dilated    Aortic Valve: There is mild to moderate regurgitation    Mitral Valve: There is mild regurgitation    Aorta: The aortic root is normal in size  The ascending aorta is mildly dilated  The ascending aorta is 3 9 cm  Strain was performed to quantify interventricular dyssynchrony and evaluate components of myocardial function due to CAD  Results from the utilization of Strain Analysis are listed in the report below  EKG, Pathology, and Other Studies Reviewed on Admission:   · EKG:  Sinus Gerhard septal infarct, age undetermined    Epic / Care Everywhere Records Reviewed: No     ** Please Note: This note has been constructed using a voice recognition system   **

## 2022-07-27 NOTE — ED NOTES
Admitting MD Dr Mohit Yeager @ the bedside to eval patient @ this time- patient continues hypetertensive- no new orders received      Mark Garduno RN  07/26/22 6294

## 2022-07-27 NOTE — ASSESSMENT & PLAN NOTE
Patient with admitting diagnosis after ED consulted Cardiology  Cardiology on board  Troponins 01>74>720   D-dimer 1 52 not heparin  EKG: Sinus Bradycardia with some T wave inversion in V5 and V6      Plan  F/U cardiology recommendations  Continue Amlodipine 10  Continue Metoprolol tartrate 50 q 12h  Continue Ticagrelor 90 q 12h  F/U Echo

## 2022-07-27 NOTE — ASSESSMENT & PLAN NOTE
Patient is status post discharge for NSTEMI 06/28/22 with cardiac catheterization    Cardiology following

## 2022-07-27 NOTE — UTILIZATION REVIEW
Initial Clinical Review    Admission: Date/Time/Statement:   Admission Orders (From admission, onward)     Ordered        07/26/22 2154  INPATIENT ADMISSION  Once                      Orders Placed This Encounter   Procedures    INPATIENT ADMISSION     Standing Status:   Standing     Number of Occurrences:   1     Order Specific Question:   Level of Care     Answer:   Med Surg [16]     Order Specific Question:   Estimated length of stay     Answer:   More than 2 Midnights     Order Specific Question:   Certification     Answer:   I certify that inpatient services are medically necessary for this patient for a duration of greater than two midnights  See H&P and MD Progress Notes for additional information about the patient's course of treatment  ED Arrival Information     Expected   -    Arrival   7/26/2022 17:45    Acuity   Urgent            Means of arrival   Walk-In    Escorted by   Family Member    Service   Hospitalist    Admission type   Urgent            Arrival complaint   Dizziness, weakness           Chief Complaint   Patient presents with    Dizziness     Pt s/p stent placement 2 weeks ago  States today he has having dizziness and abdominal discomfort / nausea  Denies CP or SOB   Initial Presentation: 67 y o  male presented to the ED with  5 days of dizziness associated with nausea weakness diarrhea and abdominal discomfort  Patient questions COVID diagnosis but per nursing does have positive family members at home when questioning the patient  PMH: HTN, recent heart catheterization  PE: bradycardia, decreased chest wall excursion and diminished breath sounds  Plan: Inpatient admission for evaluation and treatment of Cardiomyopathy due to Covid 19: Cardiology consult, Echo, continue amlodipine, Ticagrelor, lisinopril  Hold metoprolol due to bradycardia  Date: 7/27   Day 2:     Internal Medicine: Continue amlodipine, metoprolol, Ticagrelor, atorvastatin, follow up Echo      Cardiology consult: Follow-up limited TTE imaging results  Trial a 1 time dose of IV Lasix 20 mg and assess his response to this  DAPT w/aspirin/Brilinta, and high-intensity statin  D/c metoprolol and start Coreg 6 25 mg bid  Monitor renal function and electrolytes  Telemetry  ED Triage Vitals   Temperature Pulse Respirations Blood Pressure SpO2   07/26/22 1750 07/26/22 1750 07/26/22 1750 07/26/22 1751 07/26/22 1750   98 2 °F (36 8 °C) 60 18 (!) 197/93 98 %      Temp Source Heart Rate Source Patient Position - Orthostatic VS BP Location FiO2 (%)   07/26/22 1750 07/26/22 1750 07/26/22 1750 07/26/22 1750 --   Oral Monitor Lying Right arm       Pain Score       07/26/22 1905       No Pain          Wt Readings from Last 1 Encounters:   07/27/22 73 5 kg (162 lb)     Additional Vital Signs:     Date/Time Temp Pulse Resp BP MAP (mmHg) SpO2 O2 Device   07/27/22 1224 -- 55 -- 142/72 -- -- --   07/27/22 11:17:46 98 3 °F (36 8 °C) 54 Abnormal  -- 142/72 95 96 % --   07/27/22 07:56:26 97 6 °F (36 4 °C) 57 18 179/79 Abnormal  112 96 % None (Room air)   07/27/22 01:03:44 98 8 °F (37 1 °C) 54 Abnormal  18 162/77 105 96 % None (Room air)   07/26/22 2251 -- 51 Abnormal  18 169/80 -- -- None (Room air)   07/26/22 2200 -- -- -- 191/80 Abnormal  -- -- None (Room air)   07/26/22 2100 -- 71 18 184/87 Abnormal  -- -- None (Room air)   07/26/22 1930 -- 57 18 188/84 Abnormal  -- 97 % None (Room air)   07/26/22 1905 -- -- -- -- -- -- None (Room air)   07/26/22 1751 -- -- -- 197/93 Abnormal  -- -- --       Pertinent Labs/Diagnostic Test Results:   XR chest 1 view portable   Final Result by Vega Pro MD (07/27 1191)      No acute cardiopulmonary disease  Workstation performed: [id number]           7/27 Echo: Interpretation Summary         Left Ventricle: Left ventricular cavity size is normal  Wall thickness is increased  The left ventricular ejection fraction is 65% by visual estimation   Systolic function is normal  Diastolic function is mildly abnormal, consistent with grade I (abnormal) relaxation    The following segments are hypokinetic: basal inferior    All other segments are normal     Right Ventricle: Right ventricular cavity size is normal  Systolic function is normal     Aortic Valve: There is mild regurgitation    Mitral Valve: There is mild regurgitation    Tricuspid Valve: There is mild regurgitation    Aorta: The aortic root is normal in size  The ascending aorta is mildly dilated at 3 9 cm      Results from last 7 days   Lab Units 07/26/22 1913   SARS-COV-2  Positive*     Results from last 7 days   Lab Units 07/27/22  0559 07/26/22 1911   WBC Thousand/uL 7 35 7 54   HEMOGLOBIN g/dL 14 9 14 8   HEMATOCRIT % 45 5 45 4   PLATELETS Thousands/uL 159 146*   NEUTROS ABS Thousands/µL 3 96 3 77         Results from last 7 days   Lab Units 07/27/22  0559 07/26/22 1911   SODIUM mmol/L 139 142   POTASSIUM mmol/L 3 5 3 8   CHLORIDE mmol/L 110* 112*   CO2 mmol/L 23 24   ANION GAP mmol/L 6 6   BUN mg/dL 19 22   CREATININE mg/dL 0 84 1 09   EGFR ml/min/1 73sq m 86 66   CALCIUM mg/dL 8 8 8 6   MAGNESIUM mg/dL  --  2 2   PHOSPHORUS mg/dL  --  2 3     Results from last 7 days   Lab Units 07/27/22  0559 07/26/22 1911   AST U/L 19 21   ALT U/L 20 21   ALK PHOS U/L 79 81   TOTAL PROTEIN g/dL 6 6 6 6   ALBUMIN g/dL 3 8 3 8   TOTAL BILIRUBIN mg/dL 0 74 0 48         Results from last 7 days   Lab Units 07/27/22  0559 07/26/22 1911   GLUCOSE RANDOM mg/dL 98 108             Results from last 7 days   Lab Units 07/26/22 2343   CK TOTAL U/L 46     Results from last 7 days   Lab Units 07/26/22  2343 07/26/22 2108 07/26/22 1911   HS TNI 0HR ng/L  --   --  77*   HS TNI 2HR ng/L  --  89*  --    HSTNI D2 ng/L  --  12  --    HS TNI 4HR ng/L 119*  --   --    HSTNI D4 ng/L 42*  --   --      Results from last 7 days   Lab Units 07/26/22 2343   D-DIMER QUANTITATIVE ug/ml FEU 1 52*         Results from last 7 days   Lab Units 07/26/22  2343   TSH 3RD GENERATON uIU/mL 1 488                     Results from last 7 days   Lab Units 07/26/22 1911   BNP pg/mL 139*     Results from last 7 days   Lab Units 07/26/22 1911   FERRITIN ng/mL 42             Results from last 7 days   Lab Units 07/26/22 1911   CRP mg/L 3 6*             Results from last 7 days   Lab Units 07/26/22 1913   CLARITY UA  Clear   COLOR UA  Light Yellow   SPEC GRAV UA  1 017   PH UA  6 0   GLUCOSE UA mg/dl Negative   KETONES UA mg/dl Negative   BLOOD UA  Negative   PROTEIN UA mg/dl Trace*   NITRITE UA  Negative   BILIRUBIN UA  Negative   UROBILINOGEN UA (BE) mg/dl <2 0   LEUKOCYTES UA  Negative   WBC UA /hpf None Seen   RBC UA /hpf None Seen   BACTERIA UA /hpf None Seen   EPITHELIAL CELLS WET PREP /hpf None Seen     Results from last 7 days   Lab Units 07/26/22 1913   INFLUENZA A PCR  Negative   INFLUENZA B PCR  Negative   RSV PCR  Negative         ED Treatment:   Medication Administration from 07/26/2022 1745 to 07/27/2022 0052       Date/Time Order Dose Route Action     07/26/2022 2037 aspirin chewable tablet 324 mg 324 mg Oral Given     07/26/2022 2256 ticagrelor (BRILINTA) tablet 90 mg 90 mg Oral Given        Past Medical History:   Diagnosis Date    Hypertension      Present on Admission:   NSTEMI (non-ST elevated myocardial infarction) ([facility] )   Primary hypertension   Tobacco abuse      Admitting Diagnosis: Dizziness [R42]  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 ([place] [address] ) [U07 1, I43]  Age/Sex: 67 y o  male  Admission Orders:  Scheduled Medications:  amLODIPine, 10 mg, Oral, Daily  aspirin, 81 mg, Oral, Daily  atorvastatin, 40 mg, Oral, Daily With Dinner  carvedilol, 6 25 mg, Oral, BID With Meals  furosemide, 20 mg, Intravenous, Once  [START ON 7/28/2022] lisinopril, 40 mg, Oral, Daily  pantoprazole, 40 mg, Oral, Early Morning  ticagrelor, 90 mg, Oral, Q12H [facility]      Continuous IV Infusions:     PRN Meds:       IP CONSULT TO CARDIOLOGY    Network Utilization Review Department  ATTENTION: Please call with any questions or concerns to [phone] and carefully listen to the prompts so that you are directed to the right person  All voicemails are confidential   [facility] all requests for admission clinical reviews, approved or denied determinations and any other requests to dedicated fax number below belonging to the campus where the patient is receiving treatment   List of dedicated fax numbers for the Facilities:  [address] DENIALS (Administrative/Medical Necessity) [phone]   [address] (Maternity/NICU/Pediatrics) [phone]   [address]  [address] Medical [place] [address] [address] [place] [address] [address] [place] [address] [phone]

## 2022-07-27 NOTE — PROGRESS NOTES
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 [facility])  Assessment & Plan  Patient with admitting diagnosis after ED consulted Cardiology  Cardiology on board  Troponins 16>78>328   D-dimer 1 52 not heparin  EKG: Sinus Bradycardia with some T wave inversion in V5 and V6  Plan  F/U cardiology recommendations  Continue Amlodipine 10  Continue Metoprolol tartrate 50 q 12h  Continue Ticagrelor 90 q 12h  F/U Echo     COVID-19  Assessment & Plan  Patient presents having tested positive for COVID  According to nursing, patient has family at home who are positive however patient claims his symptoms of episodic weakness, nausea and dizziness exclusively on metoprolol and says has been that way since he was discharged from the hospital the last time  No O2 requirements  Mild pathway  Inflammatory markers and mild pathway labs    NSTEMI (non-ST elevated myocardial infarction) [facility])  Assessment & Plan  Patient is status post discharge for NSTEMI 06/28/22 with cardiac catheterization    Cardiology following    Primary hypertension  Assessment & Plan  BP not controlled  On admission 197/93  Overnight ranges 197/80 - 167/77    Plan  Continue amlodipine 10 daily  Continue Lisinopril 40 mg daily  Continue Metoprolol tartrate 50 q  If HR drops will decrease dose  Hyperlipemia  Assessment & Plan  Continue Atorvastatin 40 mg daily      VTE Pharmacologic Prophylaxis:   Brilinta and SCD    Patient Centered Rounds: I performed bedside rounds with nursing staff today  Discussions with Specialists or Other Care Team Provider: Cardiology    Education and Discussions with Family / Patient: Patient declined call to        Current Length of Stay: 1 day(s)  Current Patient Status: Inpatient   Discharge Plan: Anticipate discharge in 48-72 hrs to home  Code Status: Level 1 - Full Code    Subjective:   Patient was tired this morning due to not be able to get any rest overnight  Still experiencing heartburn and SOB with some palpitation at rest  Diarrhea has improved  Objective:     Vitals:   Temp (24hrs), Av 2 °F (36 8 °C), Min:97 6 °F (36 4 °C), Max:98 8 °F (37 1 °C)    Temp:  [97 6 °F (36 4 °C)-98 8 °F (37 1 °C)] 97 6 °F (36 4 °C)  HR:  [51-71] 57  Resp:  [18] 18  BP: (162-197)/(77-93) 179/79  SpO2:  [96 %-98 %] 96 %  Body mass index is 23 25 kg/m²  Input and Output Summary (last 24 hours): Intake/Output Summary (Last 24 hours) at 2022 1054  Last data filed at 2022 0151  Gross per 24 hour   Intake 240 ml   Output --   Net 240 ml       Physical Exam:   Physical Exam  Vitals and nursing note reviewed  Constitutional:       Appearance: He is well-developed  HENT:      Head: Normocephalic and atraumatic  Eyes:      Conjunctiva/sclera: Conjunctivae normal    Cardiovascular:      Rate and Rhythm: Normal rate and regular rhythm  Heart sounds: No murmur heard  Pulmonary:      Effort: Pulmonary effort is normal  No respiratory distress  Breath sounds: Normal breath sounds  Abdominal:      General: There is distension  Palpations: Abdomen is soft  There is no mass  Tenderness: There is no abdominal tenderness  Hernia: No hernia is present  Musculoskeletal:      Cervical back: Neck supple  Skin:     General: Skin is warm and dry  Neurological:      Mental Status: He is alert          Additional Data:     Labs:  Results from last 7 days   Lab Units 22  0559   WBC Thousand/uL 7 35   HEMOGLOBIN g/dL 14 9   HEMATOCRIT % 45 5   PLATELETS Thousands/uL 159   NEUTROS PCT % 54   LYMPHS PCT % 29   MONOS PCT % 14*   EOS PCT % 2     Results from last 7 days   Lab Units 22  0559   SODIUM mmol/L 139   POTASSIUM mmol/L 3 5 CHLORIDE mmol/L 110*   CO2 mmol/L 23   BUN mg/dL 19   CREATININE mg/dL 0 84   ANION GAP mmol/L 6   CALCIUM mg/dL 8 8   ALBUMIN g/dL 3 8   TOTAL BILIRUBIN mg/dL 0 74   ALK PHOS U/L 79   ALT U/L 20   AST U/L 19   GLUCOSE RANDOM mg/dL 98                       Lines/Drains:  Invasive Devices  Report    Peripheral Intravenous Line  Duration           Peripheral IV 07/26/22 Right Forearm <1 day                      Imaging: Reviewed radiology reports from this admission including: chest xray    Recent Cultures (last 7 days):         Last 24 Hours Medication List:   Current Facility-Administered Medications   Medication Dose Route Frequency Provider Last Rate    amLODIPine  10 mg Oral Daily Margarito Mcgill MD      aspirin  81 mg Oral Daily Margarito Mcgill MD      atorvastatin  40 mg Oral Daily With Yamileth Gibson MD      [START ON 7/28/2022] lisinopril  40 mg Oral Daily Katarina Bryant MD      metoprolol tartrate  50 mg Oral Q12H [facility] Margarito Mcgill MD      pantoprazole  40 mg Oral Early Morning Katarina Bryant MD      ticagrelor  90 mg Oral Q12H [facility] Margarito Mcgill MD          Today, Patient Was Seen By: Katarina Bryant MD    **Please Note: This note may have been constructed using a voice recognition system  **

## 2022-07-27 NOTE — ASSESSMENT & PLAN NOTE
Patient states stop smoking 3 weeks ago without difficulty      Not using nor has any interest in nicotine replacement

## 2022-07-27 NOTE — ASSESSMENT & PLAN NOTE
Continue home meds  Monitor vital signs  Encouraged tobacco cessation    Amlodipine 10 daily  Lisinopril 20 daily  Metoprolol tartrate 50 q 12h late p m  dose held due to bradycardia

## 2022-07-27 NOTE — ASSESSMENT & PLAN NOTE
Patient with admitting diagnosis after ED consulted Cardiology    Current meds include  Amlodipine 10  Metoprolol tartrate 50 q 12h  Ticagrelor 90 q 12h    Echo pending  Troponins 77>89>119 cardiology notified, no ACS heparin     Mild pathway labs including  CRP 3 6  Ferritin  CK  D-dimer 1 52

## 2022-07-27 NOTE — ASSESSMENT & PLAN NOTE
BP not controlled  On admission 197/93  Overnight ranges 197/80 - 167/77    Plan  Continue amlodipine 10 daily  Continue Lisinopril 40 mg daily  Continue Metoprolol tartrate 50 q  If HR drops will decrease dose

## 2022-07-27 NOTE — ASSESSMENT & PLAN NOTE
Patient presents having tested positive for COVID  According to nursing, patient has family at home who are positive however patient claims his symptoms of episodic weakness, nausea and dizziness exclusively on metoprolol and says has been that way since he was discharged from the hospital the last time      No O2 requirements  Mild pathway  Inflammatory markers and mild pathway labs

## 2022-07-27 NOTE — TELEPHONE ENCOUNTER
----- Message from Dion Anaya sent at 7/27/2022  2:14 PM EDT -----  Regarding: R/S Covid Positive  Reviewing Dr Fredrick Acevedo schedule for tomorrow, pt is admitted and Covid Positive as of 7/26  Please contact pt to r/s appt or update to virtual if possible

## 2022-07-27 NOTE — TELEPHONE ENCOUNTER
Spoke to pt and cancelled his appt  He is still in hospital right now so he will call back to make new appt

## 2022-07-28 VITALS
DIASTOLIC BLOOD PRESSURE: 93 MMHG | SYSTOLIC BLOOD PRESSURE: 152 MMHG | TEMPERATURE: 97.8 F | BODY MASS INDEX: 23.19 KG/M2 | RESPIRATION RATE: 22 BRPM | OXYGEN SATURATION: 97 % | HEART RATE: 85 BPM | HEIGHT: 70 IN | WEIGHT: 162 LBS

## 2022-07-28 DIAGNOSIS — I21.4 NSTEMI (NON-ST ELEVATED MYOCARDIAL INFARCTION) (HCC): ICD-10-CM

## 2022-07-28 PROBLEM — I50.30 DIASTOLIC CONGESTIVE HEART FAILURE (HCC): Status: ACTIVE | Noted: 2022-07-28

## 2022-07-28 PROBLEM — R77.8 ELEVATED TROPONIN LEVEL NOT DUE MYOCARDIAL INFARCTION: Status: ACTIVE | Noted: 2022-07-26

## 2022-07-28 PROBLEM — R79.89 ELEVATED TROPONIN LEVEL NOT DUE MYOCARDIAL INFARCTION: Status: ACTIVE | Noted: 2022-07-26

## 2022-07-28 LAB
ANION GAP SERPL CALCULATED.3IONS-SCNC: 9 MMOL/L (ref 4–13)
BASOPHILS # BLD AUTO: 0.04 THOUSANDS/ΜL (ref 0–0.1)
BASOPHILS NFR BLD AUTO: 0 % (ref 0–1)
BUN SERPL-MCNC: 21 MG/DL (ref 5–25)
CALCIUM SERPL-MCNC: 8.7 MG/DL (ref 8.4–10.2)
CHLORIDE SERPL-SCNC: 108 MMOL/L (ref 96–108)
CO2 SERPL-SCNC: 24 MMOL/L (ref 21–32)
CREAT SERPL-MCNC: 1 MG/DL (ref 0.6–1.3)
EOSINOPHIL # BLD AUTO: 0.24 THOUSAND/ΜL (ref 0–0.61)
EOSINOPHIL NFR BLD AUTO: 3 % (ref 0–6)
ERYTHROCYTE [DISTWIDTH] IN BLOOD BY AUTOMATED COUNT: 15.3 % (ref 11.6–15.1)
GFR SERPL CREATININE-BSD FRML MDRD: 74 ML/MIN/1.73SQ M
GLUCOSE SERPL-MCNC: 111 MG/DL (ref 65–140)
HCT VFR BLD AUTO: 46.1 % (ref 36.5–49.3)
HGB BLD-MCNC: 15.5 G/DL (ref 12–17)
IMM GRANULOCYTES # BLD AUTO: 0.04 THOUSAND/UL (ref 0–0.2)
IMM GRANULOCYTES NFR BLD AUTO: 0 % (ref 0–2)
LYMPHOCYTES # BLD AUTO: 2.36 THOUSANDS/ΜL (ref 0.6–4.47)
LYMPHOCYTES NFR BLD AUTO: 26 % (ref 14–44)
MCH RBC QN AUTO: 29.2 PG (ref 26.8–34.3)
MCHC RBC AUTO-ENTMCNC: 33.6 G/DL (ref 31.4–37.4)
MCV RBC AUTO: 87 FL (ref 82–98)
MONOCYTES # BLD AUTO: 1.21 THOUSAND/ΜL (ref 0.17–1.22)
MONOCYTES NFR BLD AUTO: 14 % (ref 4–12)
NEUTROPHILS # BLD AUTO: 5.04 THOUSANDS/ΜL (ref 1.85–7.62)
NEUTS SEG NFR BLD AUTO: 57 % (ref 43–75)
NRBC BLD AUTO-RTO: 0 /100 WBCS
PLATELET # BLD AUTO: 174 THOUSANDS/UL (ref 149–390)
PMV BLD AUTO: 11.8 FL (ref 8.9–12.7)
POTASSIUM SERPL-SCNC: 3.4 MMOL/L (ref 3.5–5.3)
RBC # BLD AUTO: 5.31 MILLION/UL (ref 3.88–5.62)
SODIUM SERPL-SCNC: 141 MMOL/L (ref 135–147)
WBC # BLD AUTO: 8.93 THOUSAND/UL (ref 4.31–10.16)

## 2022-07-28 PROCEDURE — 99232 SBSQ HOSP IP/OBS MODERATE 35: CPT | Performed by: INTERNAL MEDICINE

## 2022-07-28 PROCEDURE — 85025 COMPLETE CBC W/AUTO DIFF WBC: CPT

## 2022-07-28 PROCEDURE — 99239 HOSP IP/OBS DSCHRG MGMT >30: CPT | Performed by: INTERNAL MEDICINE

## 2022-07-28 PROCEDURE — 80048 BASIC METABOLIC PNL TOTAL CA: CPT

## 2022-07-28 RX ORDER — LORATADINE 10 MG
TABLET ORAL
Qty: 90 TABLET | Refills: 1 | Status: SHIPPED | OUTPATIENT
Start: 2022-07-28

## 2022-07-28 RX ORDER — LISINOPRIL 40 MG/1
40 TABLET ORAL DAILY
Qty: 30 TABLET | Refills: 0 | Status: CANCELLED | OUTPATIENT
Start: 2022-07-29

## 2022-07-28 RX ORDER — CARVEDILOL 12.5 MG/1
12.5 TABLET ORAL 2 TIMES DAILY WITH MEALS
Qty: 60 TABLET | Refills: 0 | Status: CANCELLED | OUTPATIENT
Start: 2022-07-28

## 2022-07-28 RX ORDER — POTASSIUM CHLORIDE 20 MEQ/1
20 TABLET, EXTENDED RELEASE ORAL ONCE
Status: COMPLETED | OUTPATIENT
Start: 2022-07-28 | End: 2022-07-28

## 2022-07-28 RX ORDER — CARVEDILOL 12.5 MG/1
12.5 TABLET ORAL 2 TIMES DAILY WITH MEALS
Qty: 60 TABLET | Refills: 0 | Status: SHIPPED | OUTPATIENT
Start: 2022-07-28 | End: 2022-08-22

## 2022-07-28 RX ORDER — CARVEDILOL 12.5 MG/1
12.5 TABLET ORAL 2 TIMES DAILY WITH MEALS
Status: DISCONTINUED | OUTPATIENT
Start: 2022-07-28 | End: 2022-07-28 | Stop reason: HOSPADM

## 2022-07-28 RX ORDER — FUROSEMIDE 20 MG/1
20 TABLET ORAL DAILY PRN
Qty: 30 TABLET | Refills: 0 | Status: SHIPPED | OUTPATIENT
Start: 2022-07-28 | End: 2022-08-22

## 2022-07-28 RX ORDER — LISINOPRIL 40 MG/1
40 TABLET ORAL DAILY
Qty: 30 TABLET | Refills: 0 | Status: SHIPPED | OUTPATIENT
Start: 2022-07-29 | End: 2022-08-22

## 2022-07-28 RX ADMIN — CARVEDILOL 6.25 MG: 6.25 TABLET, FILM COATED ORAL at 08:08

## 2022-07-28 RX ADMIN — LISINOPRIL 40 MG: 20 TABLET ORAL at 08:08

## 2022-07-28 RX ADMIN — POTASSIUM CHLORIDE 20 MEQ: 1500 TABLET, EXTENDED RELEASE ORAL at 12:00

## 2022-07-28 RX ADMIN — AMLODIPINE BESYLATE 10 MG: 10 TABLET ORAL at 08:08

## 2022-07-28 RX ADMIN — PANTOPRAZOLE SODIUM 40 MG: 40 TABLET, DELAYED RELEASE ORAL at 05:34

## 2022-07-28 RX ADMIN — TICAGRELOR 90 MG: 90 TABLET ORAL at 08:08

## 2022-07-28 RX ADMIN — ASPIRIN 81 MG CHEWABLE TABLET 81 MG: 81 TABLET CHEWABLE at 08:08

## 2022-07-28 NOTE — ASSESSMENT & PLAN NOTE
On admissionTroponins 77>89>119  Mostly secondary to Covid-19 infection  Denies any chest pain  D-dimer 1 52 not heparin  EKG: Sinus Bradycardia with some T wave inversion in V5 and V6  No evidence of ACS     ECHO 07/27/22: EF 65%    Plan  Continue Aspirin 81 mg daily  Continue Brilinta 90 mg BID  Metoprolol has been discontinued  Continue Coreg 12 5 BID  Patient is stable for discharge

## 2022-07-28 NOTE — ASSESSMENT & PLAN NOTE
Patient tested positive for COVID 19 in the ED  According to nursing, patient has family at home who are positive however patient claims his symptoms are due metoprolol      No O2 requirements  Mild pathway  Inflammatory markers and mild pathway labs    Patient is stable for discharge

## 2022-07-28 NOTE — DISCHARGE SUMMARY
[facility]  Discharge- Jerome Rubio 1950, 67 y o  male MRN: [id number]  Unit/Bed#: S -01 Encounter: [id number]  Primary Care Provider: Princess Santana MD   Date and time admitted to hospital: 7/26/2022  5:48 PM    * NON MI elevated Troponin  Assessment & Plan  On admissionTroponins 77>89>119  Mostly secondary to Covid-19 infection  Denies any chest pain  D-dimer 1 52 not heparin  EKG: Sinus Bradycardia with some T wave inversion in V5 and V6  No evidence of ACS  ECHO 07/27/22: EF 65%    Plan  Continue Aspirin 81 mg daily  Continue Brilinta 90 mg BID  Metoprolol has been discontinued  Continue Coreg 12 5 BID  Patient is stable for discharge    COVID-19  Assessment & Plan  Patient tested positive for COVID 19 in the ED  According to nursing, patient has family at home who are positive however patient claims his symptoms are due metoprolol  No O2 requirements  Mild pathway  Inflammatory markers and mild pathway labs    Patient is stable for discharge    Primary hypertension  Assessment & Plan  BP on admission 197/93  BP has been better controlled since admission  Plan  Continue amlodipine 10 daily  Continue Lisinopril 40 mg daily  Continue Coreg 12 5 BID  Hyperlipemia  Assessment & Plan  Continue Atorvastatin 40 mg daily    Mild CHF  Assessment & Plan  Wt Readings from Last 3 Encounters:   07/27/22 73 5 kg (162 lb)   07/18/22 77 3 kg (170 lb 6 4 oz)   06/30/22 77 1 kg (170 lb)       Patient has been experiencing progressively orthopnea since his PCI  Today he dneis chest pain  Has never been on Lasix  Patient improved with trial dose of IV Lasix 20  Plan  Continue with lasix 20 mg PRN        Tobacco abuse  Assessment & Plan  Patient states stop smoking 3 weeks ago without difficulty      Not using nor has any interest in nicotine replacement    Medical Problems             Resolved Problems  Date Reviewed: 7/28/2022   None               Discharging Resident: Riley Pereyra MD  Discharging Attending: Kvng Guzman MD  PCP: Lenora Goldmann, MD  Admission Date:   Admission Orders (From admission, onward)     Ordered        07/26/22 2154  INPATIENT ADMISSION  Once                      Discharge Date: 07/28/22    Consultations During Hospital Stay:  · Cardiology    Procedures Performed:   · none    Significant Findings / Test Results:   · EF of 03%, Grade 1 diastolic dysfunction on Echo  XR chest 1 view portable    Result Date: 7/27/2022  Impression: No acute cardiopulmonary disease  Incidental Findings:   · none    Test Results Pending at Discharge (will require follow up):   · none     Outpatient Tests Requested:  · none    Complications:  none    Reason for Admission: Lightheadedness and SOB     Hospital Course:   Jose Callahan is a 67 y o  male  With a pertinent past medical history of hypertension, hyperlipidemia, NSTEMI s/p PCI who originally presented to the hospital on 7/26/2022 due to lightheadedness and shortness of breath  In the ED patient was noted to be bradycardic with a heart rate of 54 and hypertensive with a blood pressure of 162/77  Patient was noted to have elevated troponin and a positive Covid 19 test   EKG showed sinus bradycardia with some T-wave inversion in V5 and V6 D-dimer was elevated at 1 52 but insignificant due to patient's mild cold with symptoms  Cardiology was consulted and patient was admitted  While admitted patient had an echocardiogram, which revealed EF of 65% with G1DD  Home dose  Metoprolol was discontinued, and patient was placed on Coreg 12 5 mg b i d  Patient was given a trial of 20 make IV Lasix for mild volume overload  Symptoms significantly improved and patient is stable for discharge on Lasix p r kasia Kate Please see above list of diagnoses and related plan for additional information       Condition at Discharge: stable    Discharge Day Visit / Exam:   * Please refer to separate progress note for these details *    Discussion with Family: Patient declined call to   Discharge instructions/Information to patient and family:   See after visit summary for information provided to patient and family  Provisions for Follow-Up Care:  See after visit summary for information related to follow-up care and any pertinent home health orders  Disposition:   Home    Planned Readmission: none    Discharge Medications:  See after visit summary for reconciled discharge medications provided to patient and/or family        **Please Note: This note may have been constructed using a voice recognition system**

## 2022-07-28 NOTE — ASSESSMENT & PLAN NOTE
BP on admission 197/93  BP has been better controlled since admission  Plan  Continue amlodipine 10 daily  Continue Lisinopril 40 mg daily  Continue Coreg 12 5 BID

## 2022-07-28 NOTE — DISCHARGE INSTR - AVS FIRST PAGE
Dear Fatimah Guidry,     It was our pleasure to care for you here at [facility]  It is our hope that we were always able to exceed the expected standards for your care during your stay  You were hospitalized due to shortness mild CHF exacerbation and Covid19  You were cared for on the third floor by Trevor Hawkins MD under the service of Shane Hunter MD with the [facility] who covers for your primary care physician (PCP), Nisreen Rodriguez MD, while you were hospitalized  If you have any questions or concerns related to this hospitalization, you may contact us at [phone]  For follow up as well as any medication refills, we recommend that you follow up with your primary care physician  A registered nurse will reach out to you by phone within a few days after your discharge to answer any additional questions that you may have after going home  However, at this time we provide for you here, the most important instructions / recommendations at discharge:     Notable Medication Adjustments -   Metoprolol was discontinued  Coreg 12 5 mg was added  Take twice a day  Lisinopril 40 mg was added take once a day  Lasix 20 mg take as needed for weight gain more than 3 pounds  Testing Required after Discharge -   none  Important follow up information -   Please follow up with cardiology and your Primary care physician   Other Instructions -   Please continue to take your home medications as prescribed  Please review this entire after visit summary as additional general instructions including medication list, appointments, activity, diet, any pertinent wound care, and other additional recommendations from your care team that may be provided for you        Sincerely,     Trevor Hawkins MD

## 2022-07-28 NOTE — TELEPHONE ENCOUNTER
Karlos Alcocer is not a patient of the [facility] [place] office  No longer under the care of Dr [facility]  Will route to PCP

## 2022-07-28 NOTE — PLAN OF CARE
Problem: Potential for Falls  Goal: Patient will remain free of falls  Description: INTERVENTIONS:  - Educate patient/family on patient safety including physical limitations  - Instruct patient to call for assistance with activity   - Consult OT/PT to assist with strengthening/mobility   - Keep Call bell within reach  - Keep bed low and locked with side rails adjusted as appropriate  - Keep care items and personal belongings within reach  - Initiate and maintain comfort rounds  - Make Fall Risk Sign visible to staff  - Offer Toileting every  Hours, in advance of need  - Initiate/Maintain alarm  - Obtain necessary fall risk management equipment:   - Apply yellow socks and bracelet for high fall risk patients  - Consider moving patient to room near nurses station  Outcome: Progressing     Problem: PAIN - ADULT  Goal: Verbalizes/displays adequate comfort level or baseline comfort level  Description: Interventions:  - Encourage patient to monitor pain and request assistance  - Assess pain using appropriate pain scale  - Administer analgesics based on type and severity of pain and evaluate response  - Implement non-pharmacological measures as appropriate and evaluate response  - Consider cultural and social influences on pain and pain management  - Notify physician/advanced practitioner if interventions unsuccessful or patient reports new pain  Outcome: Progressing     Problem: INFECTION - ADULT  Goal: Absence or prevention of progression during hospitalization  Description: INTERVENTIONS:  - Assess and monitor for signs and symptoms of infection  - Monitor lab/diagnostic results  - Monitor all insertion sites, i e  indwelling lines, tubes, and drains  - Monitor endotracheal if appropriate and nasal secretions for changes in amount and color  - Emeigh appropriate cooling/warming therapies per order  - Administer medications as ordered  - Instruct and encourage patient and family to use good hand hygiene technique  - Identify and instruct in appropriate isolation precautions for identified infection/condition  Outcome: Progressing  Goal: Absence of fever/infection during neutropenic period  Description: INTERVENTIONS:  - Monitor WBC    Outcome: Progressing     Problem: SAFETY ADULT  Goal: Patient will remain free of falls  Description: INTERVENTIONS:  - Educate patient/family on patient safety including physical limitations  - Instruct patient to call for assistance with activity   - Consult OT/PT to assist with strengthening/mobility   - Keep Call bell within reach  - Keep bed low and locked with side rails adjusted as appropriate  - Keep care items and personal belongings within reach  - Initiate and maintain comfort rounds  - Make Fall Risk Sign visible to staff  - Offer Toileting every  Hours, in advance of need  - Initiate/Maintain alarm  - Obtain necessary fall risk management equipment:   - Apply yellow socks and bracelet for high fall risk patients  - Consider moving patient to room near nurses station  Outcome: Progressing  Goal: Maintain or return to baseline ADL function  Description: INTERVENTIONS:  -  Assess patient's ability to carry out ADLs; assess patient's baseline for ADL function and identify physical deficits which impact ability to perform ADLs (bathing, care of mouth/teeth, toileting, grooming, dressing, etc )  - Assess/evaluate cause of self-care deficits   - Assess range of motion  - Assess patient's mobility; develop plan if impaired  - Assess patient's need for assistive devices and provide as appropriate  - Encourage maximum independence but intervene and supervise when necessary  - Involve family in performance of ADLs  - Assess for home care needs following discharge   - Consider OT consult to assist with ADL evaluation and planning for discharge  - Provide patient education as appropriate  Outcome: Progressing  Goal: Maintains/Returns to pre admission functional level  Description: INTERVENTIONS:  - Perform BMAT or MOVE assessment daily    - Set and communicate daily mobility goal to care team and patient/family/caregiver  - Collaborate with rehabilitation services on mobility goals if consulted  - Perform Range of Motion  times a day  - Reposition patient every  hours  - Dangle patient  times a day  - Stand patient  times a day  - Ambulate patient  times a day  - Out of bed to chair  times a day   - Out of bed for meals  times a day  - Out of bed for toileting  - Record patient progress and toleration of activity level   Outcome: Progressing     Problem: DISCHARGE PLANNING  Goal: Discharge to home or other facility with appropriate resources  Description: INTERVENTIONS:  - Identify barriers to discharge w/patient and caregiver  - Arrange for needed discharge resources and transportation as appropriate  - Identify discharge learning needs (meds, wound care, etc )  - Arrange for interpretive services to assist at discharge as needed  - Refer to Case Management Department for coordinating discharge planning if the patient needs post-hospital services based on physician/advanced practitioner order or complex needs related to functional status, cognitive ability, or social support system  Outcome: Progressing     Problem: Knowledge Deficit  Goal: Patient/family/caregiver demonstrates understanding of disease process, treatment plan, medications, and discharge instructions  Description: Complete learning assessment and assess knowledge base    Interventions:  - Provide teaching at level of understanding  - Provide teaching via preferred learning methods  Outcome: Progressing

## 2022-07-28 NOTE — PROGRESS NOTES
General Cardiology   Progress Note -  Team One   Pavan Valent 67 y o  male MRN: [id number]    Unit/Bed#: S -01 Encounter: [id number]    Assessment  1  Elevated troponin - suspect to be a non myocardial ischemic injury - likely secondary to # 2 and # 3  -Low suspicion for primary ACS  -No complaints of chest pain  -HS troponin levels 77 -- 89 -- 119  -ECG 7/26/22 - sinus bradycardia, rate 58 bpm, lateral ST T-wave abnormalities - no acute change from prior ECG in 6/22   2  Accelerated hypertension (POA) / hx of essential hypertension   -Improved  -Average /78, last recorded at 152/93   -Outpatient BP regimen; amlodipine 10 mg daily, lisinopril 20 mg daily, and metoprolol tartrate 50 mg q 12 hours  -Inpatient BP regimen; amlodipine 10 mg daily, lisinopril 40 mg daily, and carvedilol 6 25 mg b i d   3  Mild acute diastolic CHF exacerbation  4  Mild-to-moderate AI  -Appears euvolemic on exam today   -Subjectively feels significantly better from a breathing perspective  He denies SOB, LARIOS or orthopnea  -Chest x-ray - appears to have mild pulmonary vascular congestion   -Limited TTE 7/27/22 - LVEF 65%, grade 1 DD, RV normal size/systolic function, mild AI, mild MR, and mild TR  Ascending aorta mildly dilated at 3 9 cm  -  -Not previously on diuretics  -Received x1 dose of IV Lasix 20 mg yesterday with good response  5  Sinus bradycardia  ? Symptomatic - Pt had correlated administration of metoprolol with symptoms of upset stomach/nausea and diarrhea  He denied dizziness or lightheadedness  -ECG on admission 7/27/22, SB, HR 58  -HR's are currently well controlled in the 60s to 70s in NSR   -Metoprolol was discontinued yesterday and he was transitioned to carvedilol 6 25 mg b i d  He denies any further symptoms of abdominal discomfort, nausea or diarrhea  6  COVID-19  -Management per the primary team   -Asymptomatic   -Saturating in the mid 90s on RA  -On the mild COVID pathway  7  Recent NSTEMI (6/28/2022)  8  CAD s/p PCI/ALLEN x3 (mid LCX, mid RCA, and distal RCA) - (6/28/2022)  -[facility] 6/28/2022 - pLAD 50% stenosis, not flow-limiting by iFR (iFR = 0 92), mid LCX 70% stenosis, mid RCA 70% stenosis, and distal RCA 90% stenosis  -On DAPT w/aspirin 81 mg daily, and Brilinta 90 mg q 12 hours   -On high-intensity statin, and BB  9  PAD s/p prior R femoral-popliteal bypass (11/2017)  -On DAPT, and high-intensity statin therapy  10  Dyslipidemia  -Lipid profile 6/28/2022 - cholesterol 157, triglycerides 94, HDL 34, and     -On atorvastatin 40 mg daily   11  Tobacco abuse (50 pack per year history)  -Still smoking  Finds it difficult to quit   -Counseled extensively on smoking cessation  12  Hypokalemia  -K +level 3 4 this a m        Plan  -Pt is feeling well this morning  Volume status is stable and he is currently asymptomatic from a respiratory perspective  Responded well to IV diuresis yesterday   -Continue DAPT w/aspirin/Brilinta, and high-intensity statin   -Increase Coreg to 12 5 mg b i d for more effective BP control  Continue amlodipine 10 mg daily, and lisinopril 40 mg daily   -Can utilize oral Lasix 20 mg as needed (weight gain/LE edema, or SOB) - directed to discuss with cardiology office provider if utilizing Lasix as needed  -K-Dur 20 mEq x1   -Directed to obtain daily standing weights, and follow a low-sodium diet (2 g) and fluid restriction of 2 L per day  -Extensive counseling on smoking cessation   -Stable for DC from a cardiology standpoint  Will arrange follow-up upon DC  Subjective  Review of Systems   Constitutional: Negative for chills, fever and malaise/fatigue  Eyes: Negative for visual disturbance  Cardiovascular: Negative for chest pain, dyspnea on exertion, leg swelling, orthopnea and palpitations  Respiratory: Negative for cough and shortness of breath  Gastrointestinal: Negative for abdominal pain     Neurological: Negative for dizziness, headaches and light-headedness  Objective:   Physical Exam  Vitals and nursing note reviewed  Constitutional:       General: He is not in acute distress  Appearance: He is not diaphoretic  HENT:      Head: Normocephalic and atraumatic  Mouth/Throat:      Mouth: Mucous membranes are moist    Eyes:      General: No scleral icterus  Cardiovascular:      Rate and Rhythm: Normal rate and regular rhythm  Pulses: Normal pulses  Heart sounds: Normal heart sounds  Pulmonary:      Effort: Pulmonary effort is normal       Breath sounds: Normal breath sounds  No wheezing or rales  Abdominal:      Palpations: Abdomen is soft  Musculoskeletal:      Cervical back: Neck supple  Right lower leg: No edema  Left lower leg: No edema  Skin:     General: Skin is warm and dry  Capillary Refill: Capillary refill takes less than 2 seconds  Neurological:      General: No focal deficit present  Mental Status: He is alert and oriented to person, place, and time  Psychiatric:         Mood and Affect: Mood normal          Vitals: Blood pressure 152/93, pulse 85, temperature 97 8 °F (36 6 °C), resp  rate 22, height 5' 10" (1 778 m), weight 73 5 kg (162 lb), SpO2 96 %  ,     Body mass index is 23 24 kg/m²  ,   Systolic (37FUA), QGP:939 , Min:129 , SMI:356     Diastolic (06PFA), NSZ:18, Min:65, Max:93      Intake/Output Summary (Last 24 hours) at 7/28/2022 1033  Last data filed at 7/28/2022 0807  Gross per 24 hour   Intake --   Output 700 ml   Net -700 ml     Weight (last 2 days)     Date/Time Weight    07/27/22 1224 73 5 (162)    07/27/22 0600 73 5 (162 04)    07/27/22 0055 73 5 (162 04)    07/26/22 1752 74 6 (164 46)          LABORATORY RESULTS  Results from last 7 days   Lab Units 07/26/22  2343   CK TOTAL U/L 46     CBC with diff:   Results from last 7 days   Lab Units 07/28/22  0541 07/27/22  0559 07/26/22  1911   WBC Thousand/uL 8 93 7 35 7 54   HEMOGLOBIN g/dL 15 5 14 9 14 8 HEMATOCRIT % 46 1 45 5 45 4   MCV fL 87 88 90   PLATELETS Thousands/uL 174 159 146*   MCH pg 29 2 28 8 29 2   MCHC g/dL 33 6 32 7 32 6   RDW % 15 3* 15 5* 15 8*   MPV fL 11 8 11 9 11 7   NRBC AUTO /100 WBCs 0 0 0       CMP:  Results from last 7 days   Lab Units 07/28/22  0541 07/27/22  0559 07/26/22  1911   POTASSIUM mmol/L 3 4* 3 5 3 8   CHLORIDE mmol/L 108 110* 112*   CO2 mmol/L 24 23 24   BUN mg/dL 21 19 22   CREATININE mg/dL 1 00 0 84 1 09   CALCIUM mg/dL 8 7 8 8 8 6   AST U/L  --  19 21   ALT U/L  --  20 21   ALK PHOS U/L  --  79 81   EGFR ml/min/1 73sq m 74 86 66       BMP:  Results from last 7 days   Lab Units 07/28/22  0541 07/27/22  0559 07/26/22  1911   POTASSIUM mmol/L 3 4* 3 5 3 8   CHLORIDE mmol/L 108 110* 112*   CO2 mmol/L 24 23 24   BUN mg/dL 21 19 22   CREATININE mg/dL 1 00 0 84 1 09   CALCIUM mg/dL 8 7 8 8 8 6       No results found for: NTBNP     Results from last 7 days   Lab Units 07/26/22  1911   MAGNESIUM mg/dL 2 2             Results from last 7 days   Lab Units 07/26/22  2343   TSH 3RD GENERATON uIU/mL 1 488             Lipid Profile:   No results found for: CHOL  Lab Results   Component Value Date    HDL 34 (L) 06/28/2022    HDL 41 03/14/2022     Lab Results   Component Value Date    LDLCALC 104 (H) 06/28/2022    LDLCALC 117 (H) 03/14/2022     Lab Results   Component Value Date    TRIG 94 06/28/2022    TRIG 132 03/14/2022       Cardiac testing:   No results found for this or any previous visit  No results found for this or any previous visit  No results found for this or any previous visit  No valid procedures specified  No results found for this or any previous visit        Meds/Allergies   all current active meds have been reviewed and current meds:   Current Facility-Administered Medications   Medication Dose Route Frequency    amLODIPine (NORVASC) tablet 10 mg  10 mg Oral Daily    aspirin chewable tablet 81 mg  81 mg Oral Daily    atorvastatin (LIPITOR) tablet 40 mg  40 mg Oral Daily With Dinner    carvedilol (COREG) tablet 6 25 mg  6 25 mg Oral BID With Meals    lisinopril (ZESTRIL) tablet 40 mg  40 mg Oral Daily    pantoprazole (PROTONIX) EC tablet 40 mg  40 mg Oral Early Morning    ticagrelor (BRILINTA) tablet 90 mg  90 mg Oral Q12H [address]          EKG personally reviewed by KORTNEY Shah  Assessment:  Principal Problem:    NON MI elevated Troponin  Active Problems:    Primary hypertension    Tobacco abuse    NSTEMI (non-ST elevated myocardial infarction) ([facility] [address] )    COVID-19    Hyperlipemia    Counseling / Coordination of Care  Total floor / unit time spent today 20 minutes  Greater than 50% of total time was spent with the patient and / or family counseling and / or coordination of care  ** Please Note: Dragon 360 Dictation voice to text software may have been used in the creation of this document   **

## 2022-07-28 NOTE — ASSESSMENT & PLAN NOTE
Wt Readings from Last 3 Encounters:   07/27/22 73 5 kg (162 lb)   07/18/22 77 3 kg (170 lb 6 4 oz)   06/30/22 77 1 kg (170 lb)       Patient has been experiencing progressively orthopnea since his PCI  Today he dneis chest pain  Has never been on Lasix  Patient improved with trial dose of IV Lasix 20       Plan  Continue with lasix 20 mg PRN

## 2022-07-28 NOTE — DISCHARGE INSTRUCTIONS
Take your medications as directed, and keep your follow up appointments  Adhere to a heart healthy lifestyle, maintaining a low sodium diet  Daily weight and record  If your weight increases 2-3 lbs in one day, or 5 lbs in 2 days, you are short of breath or have lower extremity swelling, please call Nurse Liz Browning at  [facility] [place] [facility] ; [phone]

## 2022-07-29 ENCOUNTER — TRANSITIONAL CARE MANAGEMENT (OUTPATIENT)
Dept: INTERNAL MEDICINE CLINIC | Facility: OTHER | Age: 72
End: 2022-07-29

## 2022-07-29 NOTE — UTILIZATION REVIEW
Notification of Discharge   This is a Notification of Discharge from our facility [address]  Please be advised that this patient has been discharge from our facility  Below you will find the admission and discharge date and time including the patients disposition  UTILIZATION REVIEW CONTACT:  Tremayne Dominguez MA  Utilization   Network Utilization Review Department  Phone: [phone] x carefully listen to the prompts  All voicemails are confidential   Email: [email or web address]     PHYSICIAN ADVISORY SERVICES:  FOR ORDU-GF-EAMN REVIEW - MEDICAL NECESSITY DENIAL  Phone: [phone]  Fax: [phone]  Email: [email or web address]     PRESENTATION DATE: 7/26/2022  5:48 PM  OBERVATION ADMISSION DATE:   INPATIENT ADMISSION DATE: 7/26/22  9:54 PM   DISCHARGE DATE: 7/28/2022  3:24 PM  DISPOSITION: Home/Self Care Home/Self Care      IMPORTANT INFORMATION:  Send all requests for admission clinical reviews, approved or denied determinations and any other requests to dedicated fax number below belonging to the campus where the patient is receiving treatment   List of dedicated fax numbers:  [address] DENIALS (Administrative/Medical Necessity) [phone]   [address] (Maternity/NICU/Pediatrics) [phone]   [facility] [phone]   130 [facility] [phone]   [address] [place] [address] [phone]   [address] [place] [address],[address] [place] [address] [phone]   [facility]  [phone]   [address] [place] [address], [address]  2401 [facility] 1000 [address] [facility] [phone]

## 2022-08-02 ENCOUNTER — TELEMEDICINE (OUTPATIENT)
Dept: INTERNAL MEDICINE CLINIC | Facility: CLINIC | Age: 72
End: 2022-08-02
Payer: COMMERCIAL

## 2022-08-02 VITALS
DIASTOLIC BLOOD PRESSURE: 73 MMHG | SYSTOLIC BLOOD PRESSURE: 142 MMHG | HEIGHT: 70 IN | BODY MASS INDEX: 24.62 KG/M2 | WEIGHT: 172 LBS | TEMPERATURE: 98.5 F

## 2022-08-02 DIAGNOSIS — Z76.89 ENCOUNTER FOR SUPPORT AND COORDINATION OF TRANSITION OF CARE: Primary | ICD-10-CM

## 2022-08-02 DIAGNOSIS — R77.8 ELEVATED TROPONIN LEVEL NOT DUE MYOCARDIAL INFARCTION: ICD-10-CM

## 2022-08-02 DIAGNOSIS — U07.1 COVID-19: ICD-10-CM

## 2022-08-02 DIAGNOSIS — I25.10 CORONARY ARTERY DISEASE INVOLVING NATIVE CORONARY ARTERY OF NATIVE HEART WITHOUT ANGINA PECTORIS: ICD-10-CM

## 2022-08-02 PROCEDURE — 99496 TRANSJ CARE MGMT HIGH F2F 7D: CPT | Performed by: INTERNAL MEDICINE

## 2022-08-02 NOTE — PROGRESS NOTES
Virtual TCM Visit:    Verification of patient location:    Patient is located in the following state in which I hold an active license [place]        Assessment/Plan:        Problem List Items Addressed This Visit        Cardiovascular and Mediastinum    Coronary artery disease involving native coronary artery of native heart without angina pectoris       Other    NON MI elevated Troponin    COVID-19      Other Visit Diagnoses     Encounter for support and coordination of transition of care    -  Primary           Patient was a recent knee hospitalized for shortness of breath, evaluation revealed troponin elevation, likely secondary to intercurrent infection with COVID which was incidentally diagnosed  Patient had a subjective intolerance to metoprolol which was changed to carvedilol during hospitalization  Patient was seen by cardiologist, echo with EF of 65%  On Lasix p r n  for weight gain of more than 3 lb in a day  Patient notes that all symptoms have now resolved and he is tolerating carvedilol good  Follow-up in 1 month  Reason for visit is transition of care    Encounter provider Sandra Gonzalez MD       Provider located at [address] Sheila Suazo  [place] [id number] [facility] [address] [phone]      Recent Visits  Date Type Provider Dept   07/27/22 Telephone [address] Internal Med   Showing recent visits within past 7 days and meeting all other requirements  Today's Visits  Date Type Provider Dept   08/02/22 Telemedicine Sandra Gonzalez MD [facility]  74 Internal Med   Showing today's visits and meeting all other requirements  Future Appointments  No visits were found meeting these conditions  Showing future appointments within next 150 days and meeting all other requirements       After connecting through [employer], the patient was identified by name and date of birth   Shell Barros was informed that this is a telemedicine visit and that the visit is being conducted through telephone and patient was informed that this is not a secure, HIPAA-compliant platform  He agrees to proceed     My office door was closed  No one else was in the room  He acknowledged consent and understanding of privacy and security of the video platform  The patient has agreed to participate and understands they can discontinue the visit at any time  Patient is aware this is a billable service  Subjective:     Patient ID: Will Seip is a 67 y o  male  Pt presents for TCM virtually after recent hospital stay for Non MI troponin elevation, likely secondary to incidentally diagnosed covid illness      Review of Systems   Constitutional: Negative for activity change, appetite change, chills, diaphoresis, fatigue, fever and unexpected weight change  HENT: Negative for congestion and sore throat  Respiratory: Negative for apnea, cough, choking, chest tightness, shortness of breath, wheezing and stridor  Cardiovascular: Negative for chest pain, palpitations and leg swelling  Gastrointestinal: Negative for abdominal distention, abdominal pain, blood in stool, constipation, nausea and rectal pain  Genitourinary: Negative for dysuria, flank pain, frequency and urgency  Musculoskeletal: Negative for arthralgias, back pain, gait problem, joint swelling and myalgias  Skin: Negative for color change, pallor and rash  Neurological: Negative for headaches  Objective:    Vitals:    08/02/22 1419   BP: 142/73   Temp: 98 5 °F (36 9 °C)   Weight: 78 kg (172 lb)   Height: 5' 10" (1 778 m)       Physical Exam        Transitional Care Management Review:  Will Seip is a 67 y o  male here for TCM follow up       During the TCM phone call patient stated:    TCM Call     Date and time call was made  7/29/2022  9:31 AM    Hospital care reviewed  Records reviewed    Patient was hospitialized at  [address] [place] [address]    Date of Admission  07/26/22    Date of discharge  07/28/22    Diagnosis  non MI elevated troponin    Disposition  Home    Current Symptoms  None      TCM Call     Post hospital issues  None    Scheduled for follow up? Yes    Did you obtain your prescribed medications  Yes    Do you need help managing your prescriptions or medications  No    Is transportation to your appointment needed  No    I have advised the patient to call PCP with any new or worsening symptoms  John Griffin CMA          I spent 25 minutes with the patient during this visit  Asa Hammond MD      VIRTUAL VISIT DISCLAIMER    Theotis Amaya verbally agrees to participate in [employer]  Pt is aware that [employer] could be limited without vital signs or the ability to perform a full hands-on physical Anisapetros Vik understands he or the provider may request at any time to terminate the video visit and request the patient to seek care or treatment in person

## 2022-08-20 DIAGNOSIS — R77.8 ELEVATED TROPONIN LEVEL NOT DUE MYOCARDIAL INFARCTION: ICD-10-CM

## 2022-08-20 DIAGNOSIS — U07.1 CARDIOMYOPATHY DUE TO COVID-19 VIRUS (HCC): ICD-10-CM

## 2022-08-20 DIAGNOSIS — I43 CARDIOMYOPATHY DUE TO COVID-19 VIRUS (HCC): ICD-10-CM

## 2022-08-22 RX ORDER — FUROSEMIDE 20 MG/1
20 TABLET ORAL DAILY PRN
Qty: 90 TABLET | Refills: 0 | Status: SHIPPED | OUTPATIENT
Start: 2022-08-22

## 2022-08-22 RX ORDER — CARVEDILOL 12.5 MG/1
TABLET ORAL
Qty: 180 TABLET | Refills: 0 | Status: SHIPPED | OUTPATIENT
Start: 2022-08-22

## 2022-08-22 RX ORDER — LISINOPRIL 40 MG/1
TABLET ORAL
Qty: 90 TABLET | Refills: 0 | Status: SHIPPED | OUTPATIENT
Start: 2022-08-22

## 2022-08-22 NOTE — TELEPHONE ENCOUNTER
Yessica Álvarez is not a patient of the [address] [facility] [address]  No longer under the care of Dr Mcclain Brought  Will route to PCP

## 2022-08-31 ENCOUNTER — TELEPHONE (OUTPATIENT)
Dept: CARDIAC REHAB | Age: 72
End: 2022-08-31

## 2022-09-01 ENCOUNTER — CLINICAL SUPPORT (OUTPATIENT)
Dept: CARDIAC REHAB | Age: 72
End: 2022-09-01
Payer: COMMERCIAL

## 2022-09-01 DIAGNOSIS — I21.4 NSTEMI (NON-ST ELEVATED MYOCARDIAL INFARCTION) (HCC): ICD-10-CM

## 2022-09-01 PROCEDURE — 93797 PHYS/QHP OP CAR RHAB WO ECG: CPT

## 2022-09-01 NOTE — PROGRESS NOTES
CARDIAC REHAB ASSESSMENT    Today's date: 2022  Patient name: Pavan Gmaa     : 1950       MRN: [id number]  PCP: Renny Mulligan MD  Referring Physician: Lorene Barboza MD  Cardiologist: Niya Diana MD  Surgeon:   Dx:   Encounter Diagnosis   Name Primary?  NSTEMI (non-ST elevated myocardial infarction) [facility])        Date of onset: 22  Cultural needs: none    Weight    Wt Readings from Last 1 Encounters:   22 78 kg (172 lb)      Height:   Ht Readings from Last 1 Encounters:   22 5' 10" (1 778 m)     Medical History:   Past Medical History:   Diagnosis Date    Hypertension          Physical Limitations: none    Fall Risk: Low   Comments: Ambulates with a steady gait with no assist device and Denies a fall in the past 6 months    Anginal Equivalent: Chest Pain and B/L arm pain   NTG use: Compliant with carrying NTG, Understands proper use and Reviewed Proper use    Risk Factors   Cholesterol: Yes  Smoking:  Smoked 1ppd x 50 years  Current user:  average ppd:  0 25 ppd - tried to quit - uses a cigarette to relax - did quit for 3 months years ago      Not interested in nicotine replacement - feels smoking cessation has to do with politics  HTN: Yes  DM: No  Obesity: No   Inactivity: Yes  Stress:  perceived  stress: 5/10   Stressors: typical stressors   Goals for Stress Management:keep busy around the house    Family History:  Family History   Problem Relation Age of Onset    Stomach cancer Mother     No Known Problems Father        Allergies: Patient has no known allergies    ETOH:   Social History     Substance and Sexual Activity   Alcohol Use Yes    Comment: ocassionally         Current Medications:   Current Outpatient Medications   Medication Sig Dispense Refill    amLODIPine (NORVASC) 10 mg tablet Take 1 tablet (10 mg total) by mouth daily 90 tablet 0    atorvastatin (LIPITOR) 40 mg tablet Take 1 tablet (40 mg total) by mouth daily with dinner 90 tablet 3    carvedilol (COREG) 12 5 mg tablet TAKE 1 TABLET BY MOUTH TWICE A DAY WITH FOOD 180 tablet 0    [facility] Aspirin Adult Low Dose 81 MG chewable tablet CHEW 1 TABLET BY MOUTH DAILY 90 tablet 1    furosemide (LASIX) 20 mg tablet TAKE 1 TABLET (20 MG TOTAL) BY MOUTH DAILY AS NEEDED (WEIGHT GAIN MORE THAN 3 POUNDS) 90 tablet 0    lisinopril (ZESTRIL) 40 mg tablet TAKE 1 TABLET BY MOUTH EVERY DAY 90 tablet 0    nitroglycerin (NITROSTAT) 0 4 mg SL tablet Place 1 tablet (0 4 mg total) under the tongue every 5 (five) minutes as needed for chest pain 24 tablet 1    ticagrelor (BRILINTA) 90 MG Take 1 tablet (90 mg total) by mouth every 12 (twelve) hours 180 tablet 3     No current facility-administered medications for this visit  Functional Status Prior to Diagnosis for Treatment   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 ([employer])  Recreation: moves all day - projects  ADLs: No limitations  [place]: No limitations  Exercise: none  Other: stairs to the basement    Current Functional Status  Occupation: 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  Recreation: no physically active, gardening, cut grass, building carport  ADLs:No limitations  [place]: No limitations  Exercise: none  Other: feels better since his medications were adjusted  Started with a supplement from the Internet - DP5 (for BP)    Patient Specific Goals:  Good BP,     Short Term Program Goals: dietary modifications increased strength exercise 120-150 mins/wk    Long Term Goals: Improved [facility] Activity Status score  Improved functional capacity  Improved Quality of Life - [facility] score reduced  Smoking cessation  Reduced stress  improved Rate Your Plate Score    Ability to reach goals/rehabilitation potential:  Very Good     Projected return to function: 12 weeks  Objective tests: sub-max TM ETT      Nutritional   Reviewed details of Rate your Plate   Discussed key elements of heart healthy eating  Reviewed patient goals for dietary modifications and their clinical implications  Reviewed most recent lipid profile     Cooks for himself  No red/processed meats in a few years   Fish/chicken  veg garden  No sweets    Goals for dietary modification: choose lean cuts of meat  poultry without the skin  low fat ground meat and poultry  eliminate processed meats  reduce portions of meat to 3 oz  increase fish intake  more meatless meals  low fat dairy   reduced fat cheese  increase whole grains  increase fruits and vegetables  eliminate butter  low sodium  improved snack choices  more nuts/seeds  reduce sweets/frozen desserts  heathier choices while dining out      Emotional/Social  Patient reports he is coping well with good social support and denies depression or anxiety  Reports sufficient emotional support    Marital status:  - significant other  One daughter is a support  Son and 2 daughters    Domestic Violence Screening: No    Comments: moved from [facility] - 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

## 2022-09-01 NOTE — PROGRESS NOTES
Cardiac Rehabilitation Plan of Care   Initial Care Plan          Today's date: 2022   # of Exercise Sessions Completed: Initial Evaluation Today  Patient name: Koki Lassiter      : 1950  Age: 67 y o  MRN: [id number]  Referring Physician: Alyse Prasad MD  Cardiologist: Fátima Hoffmann MD  Provider: Haider Seo  Clinician: Tali Díaz, MS, CEP, CCRP      Dx:   Encounter Diagnosis   Name Primary?  NSTEMI (non-ST elevated myocardial infarction) [facility])      Date of onset: 22      SUMMARY OF PROGRESS:  Today is Josh's initial evaluation to begin Cardiac Rehab post NSTEMI, ALLEN x 3 to mLCX, mRCA, dRCA   The patient does not currently follow a formal exercise program at home  He has resumed all ADLs without limitations  Depression screening using the PHQ-9 interprets the patient's score of  0  as  0 =No Depression  CHERELLE-7 screening tool for anxiety suggests 0  0-4  = Not anxious  When addressed, the patient denies having depression/anxiety  Patient reports sufficient social/emotional support from his daughter when needed as well as his girlfriend  Information to begin using [employer] was provided as well as contact information for counseling through "[employer],"  PHQ-9 score will be reassessed in 30 days  The patient is a current smoker who is not ready to quit and current smoker but has reduced the number of cigarettes to 0 25 ppd per day  He does not want to quit at this time, was given materials on tobacco cessation programs/services and was counseled on effects of smoking  Patient admits to 100% medication compliance  He has added supplements he purchased on the Internet which he reports  Helps BP control  The patient completed an initial submaximal TM ETT  The patient completed 1 minutes of stage 3 (3 6METs) with test termination of RPE 6  The incline protocol was adjusted to take into account his PAD        Resting  /50 with appropriate hemodynamic response to exercise reaching 158/68  Patient denied symptoms during exercise  Telemetry revealed NSR  Patient was counseled on exercise guidelines to achieve a minimum of 150 mins/wk of moderate intensity (RPE 4-6) exercise and encouraged to add 1-2 days of exercise on opposite days of cardiac rehab as tolerated  We discussed current dietary habits and goals of heart healthy eating for lipid management  Patient's goals include: improved/controlled BP control  The patient's CAD risk factors include:  inactivity, stress, hypertension, hyperlipidemia and smoking  His education will focus on lifestyle modification/education specific to His needs  Patient will attend group education classes on heart healthy eating, reading food labels, stress management, risk factor reduction, understanding heart disease and common heart medications  Patient will attend 35 monitored exercise sessions, 3x/wk for 12-18 weeks beginning 2022         Medication compliance: Yes   Comments: Pt reports to be compliant with medications  Fall Risk: Low   Comments: Ambulates with a steady gait with no assist device and Denies a fall in the past 6 months    EKG Interpretation: NSR      EXERCISE ASSESSMENT and PLAN    Exercise Prescription:      Frequency: 3 days/week   Supplement with home exercise 2+ days/wk as tolerated       Minutes: 30-40         METS: 2 0-3 8            HR: 100-125   RPE: 4-5         Modalities: Treadmill, Airdyne bike, UBE, Lifecycle and Recumbent bike      30 Day Goals for Exercise Progression:    Frequency: 3 days/week of cardiac rehab       Supplement with home exercise 2+ days/wk as tolerated    Minutes: 40                              >150 mins/wk of moderate intensity exercise   METS: 2 4-4 2   HR: 100-125    RPE: 4-6   Modalities: Treadmill, Airdyne bike, UBE, Lifecycle and Rower    Strength trainin-3 days / week  12-15 repetitions  1-2 sets per modality   Will be added following 2-3 weeks of monitored exercise sessions   Modalities: Leg Press, Chest Press, Pull Downs and Lateral Raise    Home Exercise: none    Goals: 10% improvement in functional capacity - based on max METs achieved in fitness assessment, Increase in exercise capacity by 40% - based on peak METs tolerated in cardiac rehab exercise session, Exercise 5 days/wk, >150mins/wk of moderate intensity exercise, Attend Rehab regularly, start a home exercise program and increased time of onset of claudication    Progression Toward Goals:  Reviewed Pt goals and determined plan of care, Will continue to educate and progress as tolerated  Education: benefit of exercise for CAD risk factors, AHA guidelines to achieve >150 mins/wk of moderate exercise and RPE scale   Plan:education on home exercise guidelines, home exercise 30+ mins 2 days opposite CR and Education class: Risk Factors for Heart Disease  Readiness to change: Pre-Contemplation:   (Not yet acknowledging that there is a problem behavior that needs to change)      NUTRITION ASSESSMENT AND PLAN    Weight control:    Starting weight: 167   Current weight:       Diabetes: N/A  Lipid management: Discussed diet and lipid management and Last lipid profile 6/28/22  Chol 184    HDL 41      Goals:LDL <100, Improved Rate Your Plate score  >02, choose lean meat (93-95%), eliminate processed meats, reduce portion sizes of meat to 3oz or less, increase intake of meatless meals, use olive or canola oil in baking, choose low sodium processed foods, eliminate butter, Increase intake of nuts and seeds, seldom eat or choose low fat ice-cream, fruit juice bars or frozen yogurt , eliminate or choose low-fat sweets and daily saturated fat intake <7%/13g    Progression Toward Goals: Reviewed Pt goals and determined plan of care, Will continue to educate and progress as tolerated      Education: heart healthy eating  low sodium diet  hydration  nutrition for  lipid management  Plan: Education class: Reading Food Labels, Education Class: Heart Healthy Eating, switch to low fat cheeses, replace butter with soft spreads made with olive oil, canola or yogurt, reduce portion sizes, reduce red meat 1x/wk, avoid processed foods, drink more water, learn how to read food labels, replace sugar with stevia or truvia and keep added daily sugar <25g/day  Readiness to change: Pre-Contemplation:   (Not yet acknowledging that there is a problem behavior that needs to change)      PSYCHOSOCIAL ASSESSMENT AND PLAN    Emotional:  Depression assessment:  PHQ-9 = 0  0 =No Depression            Anxiety measure:  CHERELLE-7 = 0  0-4  = Not anxious  Self-reported stress level:  5  Social support: Very Good and Patient reports excellent emotional/social support from family    Goals:  Overall Health in [place] Score < 3    Progression Toward Goals: Reviewed Pt goals and determined plan of care, Will continue to educate and progress as tolerated      Education: benefits of a positive support system and stress management techniques  Plan: Class: Stress and Your Health, Class: Relaxation, Spend time outdoors and gardening, building projects, help daughter with cookie company  Readiness to change: Contemplation:  (Acknowledging that there is a problem but not yet ready or sure of wanting to make a change)      OTHER CORE COMPONENTS     Tobacco:   Social History     Tobacco Use   Smoking Status Heavy Tobacco Smoker    Packs/day: 0 50    Types: Cigarettes    Start date: 3/11/1975   Smokeless Tobacco Never Used       Tobacco Use Intervention:   Brief counseling by cardiac rehab professional  Date: 22  [place] Quit Line 1-800-QUIT-NOW  Pt continues to smoke 0 25ppd   Pt is not ready to quit    Anginal Symptoms:  chest  pain and pain radiating down both arms   NTG use: Compliant with carrying NTG, Understands proper use, Reviewed Proper use and Pt has not used NTG since event    Blood pressure:    Restin/50   Exercise: 158/68    Goals: reduced dietary sodium <2300mg, moderate intensity exercise >150 mins/wk, medication compliance and reduce number of cigarettes/day    Progression Toward Goals: Reviewed Pt goals and determined plan of care, Will continue to educate and progress as tolerated      Education:  low sodium diet and HTN, smoking and heart disease and provide information on tobacco cessation treatment  Plan: Class: Understanding Heart Disease, Class: Common Heart Medications, reduce number of cigarettes per day, Avoid Processed foods, engage in regular exercise, eliminate salt shaker at the table, use salt substitutes, check labels for sodium content and monitor home BP  Readiness to change: Pre-Contemplation:   (Not yet acknowledging that there is a problem behavior that needs to change)

## 2022-09-07 ENCOUNTER — CLINICAL SUPPORT (OUTPATIENT)
Dept: CARDIAC REHAB | Age: 72
End: 2022-09-07
Payer: COMMERCIAL

## 2022-09-07 DIAGNOSIS — I21.4 NSTEMI (NON-ST ELEVATED MYOCARDIAL INFARCTION) (HCC): ICD-10-CM

## 2022-09-07 PROCEDURE — 93798 PHYS/QHP OP CAR RHAB W/ECG: CPT

## 2022-09-09 ENCOUNTER — CLINICAL SUPPORT (OUTPATIENT)
Dept: CARDIAC REHAB | Age: 72
End: 2022-09-09
Payer: COMMERCIAL

## 2022-09-09 DIAGNOSIS — I21.4 NSTEMI (NON-ST ELEVATED MYOCARDIAL INFARCTION) (HCC): ICD-10-CM

## 2022-09-09 PROCEDURE — 93798 PHYS/QHP OP CAR RHAB W/ECG: CPT

## 2022-09-12 ENCOUNTER — CLINICAL SUPPORT (OUTPATIENT)
Dept: CARDIAC REHAB | Age: 72
End: 2022-09-12
Payer: COMMERCIAL

## 2022-09-12 DIAGNOSIS — I21.4 NSTEMI (NON-ST ELEVATED MYOCARDIAL INFARCTION) (HCC): ICD-10-CM

## 2022-09-12 PROCEDURE — 93798 PHYS/QHP OP CAR RHAB W/ECG: CPT

## 2022-09-14 ENCOUNTER — CLINICAL SUPPORT (OUTPATIENT)
Dept: CARDIAC REHAB | Age: 72
End: 2022-09-14
Payer: COMMERCIAL

## 2022-09-14 DIAGNOSIS — I21.4 NSTEMI (NON-ST ELEVATED MYOCARDIAL INFARCTION) (HCC): ICD-10-CM

## 2022-09-14 PROCEDURE — 93798 PHYS/QHP OP CAR RHAB W/ECG: CPT

## 2022-09-16 ENCOUNTER — CLINICAL SUPPORT (OUTPATIENT)
Dept: CARDIAC REHAB | Age: 72
End: 2022-09-16
Payer: COMMERCIAL

## 2022-09-16 DIAGNOSIS — I21.4 NSTEMI (NON-ST ELEVATED MYOCARDIAL INFARCTION) (HCC): ICD-10-CM

## 2022-09-16 PROCEDURE — 93798 PHYS/QHP OP CAR RHAB W/ECG: CPT

## 2022-09-19 ENCOUNTER — APPOINTMENT (OUTPATIENT)
Dept: CARDIAC REHAB | Age: 72
End: 2022-09-19
Payer: COMMERCIAL

## 2022-09-21 ENCOUNTER — CLINICAL SUPPORT (OUTPATIENT)
Dept: CARDIAC REHAB | Age: 72
End: 2022-09-21
Payer: COMMERCIAL

## 2022-09-21 DIAGNOSIS — I21.4 NSTEMI (NON-ST ELEVATED MYOCARDIAL INFARCTION) (HCC): ICD-10-CM

## 2022-09-21 PROCEDURE — 93798 PHYS/QHP OP CAR RHAB W/ECG: CPT

## 2022-09-23 ENCOUNTER — CLINICAL SUPPORT (OUTPATIENT)
Dept: CARDIAC REHAB | Age: 72
End: 2022-09-23
Payer: COMMERCIAL

## 2022-09-23 DIAGNOSIS — I21.4 NSTEMI (NON-ST ELEVATED MYOCARDIAL INFARCTION) (HCC): ICD-10-CM

## 2022-09-23 PROCEDURE — 93798 PHYS/QHP OP CAR RHAB W/ECG: CPT

## 2022-09-26 ENCOUNTER — CLINICAL SUPPORT (OUTPATIENT)
Dept: CARDIAC REHAB | Age: 72
End: 2022-09-26
Payer: COMMERCIAL

## 2022-09-26 DIAGNOSIS — I21.4 NSTEMI (NON-ST ELEVATED MYOCARDIAL INFARCTION) (HCC): ICD-10-CM

## 2022-09-26 PROCEDURE — 93798 PHYS/QHP OP CAR RHAB W/ECG: CPT

## 2022-09-26 NOTE — PROGRESS NOTES
Cardiac Rehabilitation Plan of Care   30 Day Reassessment          Today's date: 2022   # of Exercise Sessions Completed: 9  Patient name: Karlos Alcocer      : 1950  Age: 67 y o  MRN: [id number]  Referring Physician: Toby Okeefe MD  Cardiologist: Ann Marie Cartagena MD  Provider: Sonia Newman  Clinician: Dread Lee MS, CEP      Dx:   Encounter Diagnosis   Name Primary?  NSTEMI (non-ST elevated myocardial infarction) [facility])      Date of onset: 22      SUMMARY OF PROGRESS:    22: Jessica Diane is compliant attending cardiac rehab exercise sessions 3x/wk  He tolerates 33-40 mins at 2 4 - 3 1 METs  He is tolerating progression of intensity levels to maintain RPE 4-6  Resting BP  126/64 - 148/70 with appropriate response to exercise reaching 138/74- 182/76  NSR on tele with PACS observed  RHR 65 - 82 ExHR 93 - 108  He has added home exercise daily days/wk which includes outdoor walking for 30-60mins  No cardiac complaints  Current MH of PAD; claudication reducing and exercise duration increasing  He states more conssitent BP, he is able to walk farther before onset of claudication and has increased energy  He is progressing toward wt loss goals with a loss of 3 pounds  Patient has been working on  dietary modifications with the goal of rare red/processed meats, low fat dairy, reduced added sugars and refined flours  The patient is a smoker ( 25ppd x 47 years)  He does not want to quit at this time  Patient attends group educational classes on cardiac risk factor modification  His exercise program will be progressed as tolerated to maintain RPE 4-6  The patient has the following personal goals he hopes to achieved by discharge: improved/controlled BP  Pt will continue to be educated on lifestyle modifications and encouraged to supplement with a home exercise program to reach his goals  Will continue to monitor  22:  Today is Josh's initial evaluation to begin Cardiac Rehab post NSTEMI, ALLEN x 3 to mLCX, mRCA, dRCA   The patient does not currently follow a formal exercise program at home  He has resumed all ADLs without limitations  Depression screening using the PHQ-9 interprets the patient's score of  0  as  0 =No Depression  CHERELLE-7 screening tool for anxiety suggests 0  0-4  = Not anxious  When addressed, the patient denies having depression/anxiety  Patient reports sufficient social/emotional support from his daughter when needed as well as his girlfriend  Information to begin using [employer] was provided as well as contact information for counseling through [employer]  PHQ-9 score will be reassessed in 30 days  The patient is a current smoker who is not ready to quit and current smoker but has reduced the number of cigarettes to 0 25 ppd per day  He does not want to quit at this time, was given materials on tobacco cessation programs/services and was counseled on effects of smoking  Patient admits to 100% medication compliance  He has added supplements he purchased on the Internet which he reports  Helps BP control  The patient completed an initial submaximal TM ETT  The patient completed 1 minutes of stage 3 (3 6METs) with test termination of RPE 6  The incline protocol was adjusted to take into account his PAD  Resting  /50 with appropriate hemodynamic response to exercise reaching 158/68  Patient denied symptoms during exercise  Telemetry revealed NSR  Patient was counseled on exercise guidelines to achieve a minimum of 150 mins/wk of moderate intensity (RPE 4-6) exercise and encouraged to add 1-2 days of exercise on opposite days of cardiac rehab as tolerated  We discussed current dietary habits and goals of heart healthy eating for lipid management  Patient's goals include: improved/controlled BP control  The patient's CAD risk factors include:  inactivity, stress, hypertension, hyperlipidemia and smoking    His education will focus on lifestyle modification/education specific to His needs  Patient will attend group education classes on heart healthy eating, reading food labels, stress management, risk factor reduction, understanding heart disease and common heart medications  Patient will attend 35 monitored exercise sessions, 3x/wk for 12-18 weeks beginning 2022         Medication compliance: Yes   Comments: Pt reports to be compliant with medications  Fall Risk: Low   Comments: Ambulates with a steady gait with no assist device and Denies a fall in the past 6 months    EKG Interpretation: NSR      EXERCISE ASSESSMENT and PLAN    Exercise Prescription:      Frequency: 3 days/week   Supplement with home exercise 2+ days/wk as tolerated       Minutes: 33-40         METS: 2 4-3 1           HR:    RPE: 4-5         Modalities: Treadmill, Airdyne bike, UBE and Recumbent bike      30 Day Goals for Exercise Progression:    Frequency: 3 days/week of cardiac rehab       Supplement with home exercise 2+ days/wk as tolerated    Minutes: 40                              >150 mins/wk of moderate intensity exercise   METS: 2 5-4 2   HR:     RPE: 4-6   Modalities: Treadmill, Airdyne bike, UBE, Lifecycle and Rower    Strength trainin-3 days / week  12-15 repetitions  1-2 sets per modality   Will be added following 2-3 weeks of monitored exercise sessions   Modalities: Leg Press, Chest Press, Pull Downs and Lateral Raise    Home Exercise: Type: walking, Frequency: daily days/week, Duration: 30-60 mins    Goals: 10% improvement in functional capacity - based on max METs achieved in fitness assessment, Increase in exercise capacity by 40% - based on peak METs tolerated in cardiac rehab exercise session, Exercise 5 days/wk, >150mins/wk of moderate intensity exercise, Attend Rehab regularly, start a home exercise program and increased time of onset of claudication    Progression Toward Goals:  Pt is progressing and showing improvement  toward the following goals:  10% improvement in functional capacity - based on max METs achieved in fitness assessment, Increase in exercise capacity by 40% - based on peak METs tolerated in cardiac rehab exercise session, Exercise 5 days/wk, >150mins/wk of moderate intensity exercise, Attend Rehab regularly, start a home exercise program and increased time of onset of claudication  , Patient will EDU: RF for Heart Disease in the next 30 days, Will continue to educate and progress as tolerated  Education: benefit of exercise for CAD risk factors, AHA guidelines to achieve >150 mins/wk of moderate exercise and RPE scale   Plan:education on home exercise guidelines, home exercise 30+ mins 2 days opposite CR and Education class: Risk Factors for Heart Disease  Readiness to change: Action:  (Changing behavior)      NUTRITION ASSESSMENT AND PLAN    Weight control:    Starting weight: 167   Current weight:   164  Diabetes: N/A  Lipid management: Discussed diet and lipid management and Last lipid profile 6/28/22  Chol 184    HDL 41      Goals:LDL <100, Improved Rate Your Plate score  >61, choose lean meat (93-95%), eliminate processed meats, reduce portion sizes of meat to 3oz or less, increase intake of meatless meals, use olive or canola oil in baking, choose low sodium processed foods, eliminate butter, Increase intake of nuts and seeds, seldom eat or choose low fat ice-cream, fruit juice bars or frozen yogurt , eliminate or choose low-fat sweets and daily saturated fat intake <7%/13g    Progression Toward Goals: Pt is progressing and showing improvement  toward the following goals:  eliminated salt   , Pt has not made progress toward the following goals: reducing saturated fats  , Will continue to educate and progress as tolerated      Education: heart healthy eating  low sodium diet  hydration  nutrition for  lipid management  Plan: Education class: Reading Food Labels, Education Class: Heart Healthy Eating, switch to low fat cheeses, replace butter with soft spreads made with olive oil, canola or yogurt, reduce portion sizes, reduce red meat 1x/wk, avoid processed foods, drink more water, learn how to read food labels, replace sugar with stevia or truvia and keep added daily sugar <25g/day  Readiness to change: Pre-Contemplation:   (Not yet acknowledging that there is a problem behavior that needs to change)      PSYCHOSOCIAL ASSESSMENT AND PLAN    Emotional:  Depression assessment:  PHQ-9 = 0  0 =No Depression            Anxiety measure:  CHERELLE-7 = 0  0-4  = Not anxious  Self-reported stress level:  5  Social support: Very Good and Patient reports excellent emotional/social support from family    Goals:  Overall Health in [place] Score < 3    Progression Toward Goals: Pt is progressing and showing improvement  toward the following goals:  Overall Health in [place] Score < 3   , Patient will EDU: Stress/Relaxation in the next 30 days, Will continue to educate and progress as tolerated      Education: benefits of a positive support system and stress management techniques  Plan: Class: Stress and Your Health, Class: Relaxation, Spend time outdoors and gardening, building projects, help daughter with cookie company  Readiness to change: Action:  (Changing behavior)      OTHER CORE COMPONENTS     Tobacco:   Social History     Tobacco Use   Smoking Status Heavy Tobacco Smoker    Packs/day: 0 50    Types: Cigarettes    Start date: 3/11/1975   Smokeless Tobacco Never Used       Tobacco Use Intervention:   Brief counseling by cardiac rehab professional  Date: 22  [place] Quit Line 1-800-QUIT-NOW  Pt continues to smoke 0 25ppd   Pt is not ready to quit    Anginal Symptoms:  chest  pain and pain radiating down both arms   NTG use: Compliant with carrying NTG, Understands proper use, Reviewed Proper use and Pt has not used NTG since event    Blood pressure:    Restin//70   Exercise: 138//74    Goals: reduced dietary sodium <2300mg, moderate intensity exercise >150 mins/wk, medication compliance and reduce number of cigarettes/day    Progression Toward Goals: Pt is progressing and showing improvement  toward the following goals:  reduced dietary sodium <2300mg, moderate intensity exercise >150 mins/wk, medication compliance and reduce number of cigarettes/day   , Will continue to educate and progress as tolerated      Education:  low sodium diet and HTN, smoking and heart disease and provide information on tobacco cessation treatment  Plan: Class: Understanding Heart Disease, Class: Common Heart Medications, reduce number of cigarettes per day, Avoid Processed foods, engage in regular exercise, eliminate salt shaker at the table, use salt substitutes, check labels for sodium content and monitor home BP  Readiness to change: Action:  (Changing behavior)

## 2022-09-28 ENCOUNTER — CLINICAL SUPPORT (OUTPATIENT)
Dept: CARDIAC REHAB | Age: 72
End: 2022-09-28
Payer: COMMERCIAL

## 2022-09-28 DIAGNOSIS — I21.4 NSTEMI (NON-ST ELEVATED MYOCARDIAL INFARCTION) (HCC): ICD-10-CM

## 2022-09-28 PROCEDURE — 93798 PHYS/QHP OP CAR RHAB W/ECG: CPT

## 2022-09-30 ENCOUNTER — CLINICAL SUPPORT (OUTPATIENT)
Dept: CARDIAC REHAB | Age: 72
End: 2022-09-30
Payer: COMMERCIAL

## 2022-09-30 DIAGNOSIS — I21.4 NSTEMI (NON-ST ELEVATED MYOCARDIAL INFARCTION) (HCC): Primary | ICD-10-CM

## 2022-09-30 PROCEDURE — 93798 PHYS/QHP OP CAR RHAB W/ECG: CPT

## 2022-10-03 ENCOUNTER — CLINICAL SUPPORT (OUTPATIENT)
Dept: CARDIAC REHAB | Age: 72
End: 2022-10-03
Payer: COMMERCIAL

## 2022-10-03 DIAGNOSIS — I21.4 NSTEMI (NON-ST ELEVATED MYOCARDIAL INFARCTION) (HCC): ICD-10-CM

## 2022-10-03 PROCEDURE — 93798 PHYS/QHP OP CAR RHAB W/ECG: CPT

## 2022-10-05 ENCOUNTER — CLINICAL SUPPORT (OUTPATIENT)
Dept: CARDIAC REHAB | Age: 72
End: 2022-10-05
Payer: COMMERCIAL

## 2022-10-05 ENCOUNTER — RA CDI HCC (OUTPATIENT)
Dept: OTHER | Facility: HOSPITAL | Age: 72
End: 2022-10-05

## 2022-10-05 DIAGNOSIS — I21.4 NSTEMI (NON-ST ELEVATED MYOCARDIAL INFARCTION) (HCC): ICD-10-CM

## 2022-10-05 PROCEDURE — 93798 PHYS/QHP OP CAR RHAB W/ECG: CPT

## 2022-10-05 NOTE — PROGRESS NOTES
[facility]  coding opportunities     I11 0  Chart Reviewed number of suggestions sent to Provider: 1     Patients Insurance     Medicare Insurance: [address] [place] [address]

## 2022-10-07 ENCOUNTER — CLINICAL SUPPORT (OUTPATIENT)
Dept: CARDIAC REHAB | Age: 72
End: 2022-10-07
Payer: COMMERCIAL

## 2022-10-07 DIAGNOSIS — I21.4 NSTEMI (NON-ST ELEVATED MYOCARDIAL INFARCTION) (HCC): ICD-10-CM

## 2022-10-07 PROCEDURE — 93798 PHYS/QHP OP CAR RHAB W/ECG: CPT

## 2022-10-10 ENCOUNTER — CLINICAL SUPPORT (OUTPATIENT)
Dept: CARDIAC REHAB | Age: 72
End: 2022-10-10
Payer: COMMERCIAL

## 2022-10-10 ENCOUNTER — OFFICE VISIT (OUTPATIENT)
Dept: INTERNAL MEDICINE CLINIC | Facility: CLINIC | Age: 72
End: 2022-10-10
Payer: COMMERCIAL

## 2022-10-10 VITALS
SYSTOLIC BLOOD PRESSURE: 170 MMHG | HEART RATE: 68 BPM | DIASTOLIC BLOOD PRESSURE: 80 MMHG | OXYGEN SATURATION: 98 % | BODY MASS INDEX: 24.34 KG/M2 | HEIGHT: 70 IN | TEMPERATURE: 97.7 F | WEIGHT: 170 LBS

## 2022-10-10 DIAGNOSIS — Z28.21 IMMUNIZATION DECLINED: ICD-10-CM

## 2022-10-10 DIAGNOSIS — I25.10 CORONARY ARTERY DISEASE INVOLVING NATIVE CORONARY ARTERY OF NATIVE HEART WITHOUT ANGINA PECTORIS: ICD-10-CM

## 2022-10-10 DIAGNOSIS — Z00.00 MEDICARE ANNUAL WELLNESS VISIT, SUBSEQUENT: Primary | ICD-10-CM

## 2022-10-10 DIAGNOSIS — I21.4 NSTEMI (NON-ST ELEVATED MYOCARDIAL INFARCTION) (HCC): ICD-10-CM

## 2022-10-10 DIAGNOSIS — Z72.0 TOBACCO ABUSE: ICD-10-CM

## 2022-10-10 DIAGNOSIS — Z12.2 SCREENING FOR LUNG CANCER: ICD-10-CM

## 2022-10-10 DIAGNOSIS — I10 PRIMARY HYPERTENSION: ICD-10-CM

## 2022-10-10 DIAGNOSIS — E78.2 MIXED HYPERLIPIDEMIA: ICD-10-CM

## 2022-10-10 PROCEDURE — G0439 PPPS, SUBSEQ VISIT: HCPCS | Performed by: INTERNAL MEDICINE

## 2022-10-10 PROCEDURE — 93798 PHYS/QHP OP CAR RHAB W/ECG: CPT

## 2022-10-10 RX ORDER — AMLODIPINE BESYLATE 10 MG/1
10 TABLET ORAL DAILY
Qty: 90 TABLET | Refills: 0 | Status: SHIPPED | OUTPATIENT
Start: 2022-10-10

## 2022-10-10 NOTE — PROGRESS NOTES
Assessment and Plan:     Problem List Items Addressed This Visit        Cardiovascular and Mediastinum    Primary hypertension    Relevant Medications    amLODIPine (NORVASC) 10 mg tablet    Coronary artery disease involving native coronary artery of native heart without angina pectoris    Relevant Medications    amLODIPine (NORVASC) 10 mg tablet       Other    Tobacco abuse    Relevant Orders    CT lung screening program    Hyperlipemia      Other Visit Diagnoses     Medicare annual wellness visit, subsequent    -  Primary    Screening for lung cancer        Relevant Orders    CT lung screening program    Immunization declined        Declines a flu, COVID, pneumonia vaccines          Tobacco Cessation Counseling: Tobacco cessation counseling was provided  The patient is sincerely urged to quit consumption of tobacco  He is not ready to quit tobacco      Lung Cancer Screening Shared Decision Making: I discussed with him that he is a candidate for lung cancer CT screening  The following Shared Decision-Making points were covered:  1  Benefits of screening were discussed, including the rates of reduction in death from lung cancer and other causes  Harms of screening were reviewed, including false positive tests, radiation exposure levels, risks of invasive procedures, risks of complications of screening, and risk of overdiagnosis  2  I counseled on the importance of adherence to annual lung cancer LDCT screening, impact of co-morbidities, and ability or willingness to undergo diagnosis and treatment    3  I counseled on the importance of maintaining abstinence as a former smoker or was counseled on the importance of smoking cessation if a current smoker    Review of Eligibility Criteria: He meets all of the criteria for Lung Cancer Screening    - He is 67 y o    - He has 20 pack year tobacco history and is a current smoker or has quit within the past 15 years  - He presents no signs or symptoms of lung cancer    After discussion, the patient decided to elect lung cancer screening  Preventive health issues were discussed with patient, and age appropriate screening tests were ordered as noted in patient's After Visit Summary  Personalized health advice and appropriate referrals for health education or preventive services given if needed, as noted in patient's After Visit Summary  History of Present Illness:     Patient presents for a Medicare Wellness Visit    Patient comes for Medicare annual wellness     Patient Care Team:  Lewis Araujo MD as PCP - General (Internal Medicine)     Review of Systems:     Review of Systems   Constitutional: Negative for activity change, appetite change, chills, diaphoresis, fatigue, fever and unexpected weight change  HENT: Negative for congestion and sore throat  Respiratory: Negative for apnea, cough, choking, chest tightness, shortness of breath, wheezing and stridor  Cardiovascular: Negative for chest pain, palpitations and leg swelling  Gastrointestinal: Negative for abdominal distention, abdominal pain, blood in stool, constipation, nausea and rectal pain  Genitourinary: Negative for dysuria, flank pain, frequency and urgency  Musculoskeletal: Negative for arthralgias, back pain, gait problem, joint swelling and myalgias  Skin: Negative for color change, pallor and rash  Neurological: Negative for headaches          Problem List:     Patient Active Problem List   Diagnosis   • PAD (peripheral artery disease) (HCC)   • Hypertensive crisis, unspecified   • Primary hypertension   • Tobacco abuse   • Umbilical hernia without obstruction and without gangrene   • NSTEMI (non-ST elevated myocardial infarction) [facility])   • Hospital discharge follow-up   • Coronary artery disease involving native coronary artery of native heart without angina pectoris   • NON MI elevated Troponin   • COVID-19   • Hyperlipemia   • Mild CHF      Past Medical and Surgical History:     Past Medical History:   Diagnosis Date   • Hypertension      Past Surgical History:   Procedure Laterality Date   • ARTERY SURGERY  2018   • CARDIAC CATHETERIZATION N/A 6/28/2022    Procedure: CARDIAC CATHETERIZATION;  Surgeon: Elsy Wellington MD;  [facility]; Service: Cardiology   • CARDIAC CATHETERIZATION N/A 6/28/2022    Procedure: Cardiac pci;  Surgeon: Elsy Wellington MD;  [facility]; Service: Cardiology      Family History:     Family History   Problem Relation Age of Onset   • Stomach cancer Mother    • No Known Problems Father       Social History:     Social History     Socioeconomic History   • Marital status: Single     Spouse name: None   • Number of children: None   • Years of education: None   • Highest education level: None   Occupational History   • None   Tobacco Use   • Smoking status: Heavy Tobacco Smoker     Packs/day: 0 50     Types: Cigarettes     Start date: 3/11/1975   • Smokeless tobacco: Never Used   Vaping Use   • Vaping Use: Never used   Substance and Sexual Activity   • Alcohol use: Yes     Comment: ocassionally   • Drug use: Never   • Sexual activity: None   Other Topics Concern   • None   Social History Narrative   • None     Social Determinants of Health     Financial Resource Strain: Low Risk    • Difficulty of Paying Living Expenses: Not hard at all   Food Insecurity: Not on file   Transportation Needs: No Transportation Needs   • Lack of Transportation (Medical): No   • Lack of Transportation (Non-Medical):  No   Physical Activity: Not on file   Stress: Not on file   Social Connections: Not on file   Intimate Partner Violence: Not on file   Housing Stability: Not on file      Medications and Allergies:     Current Outpatient Medications   Medication Sig Dispense Refill   • amLODIPine (NORVASC) 10 mg tablet Take 1 tablet (10 mg total) by mouth daily 90 tablet 0   • atorvastatin (LIPITOR) 40 mg tablet Take 1 tablet (40 mg total) by mouth daily with dinner 90 tablet 3   • carvedilol (COREG) 12 5 mg tablet TAKE 1 TABLET BY MOUTH TWICE A DAY WITH FOOD 180 tablet 0   • [facility] Aspirin Adult Low Dose 81 MG chewable tablet CHEW 1 TABLET BY MOUTH DAILY 90 tablet 1   • furosemide (LASIX) 20 mg tablet TAKE 1 TABLET (20 MG TOTAL) BY MOUTH DAILY AS NEEDED (WEIGHT GAIN MORE THAN 3 POUNDS) 90 tablet 0   • lisinopril (ZESTRIL) 40 mg tablet TAKE 1 TABLET BY MOUTH EVERY DAY 90 tablet 0   • nitroglycerin (NITROSTAT) 0 4 mg SL tablet Place 1 tablet (0 4 mg total) under the tongue every 5 (five) minutes as needed for chest pain 24 tablet 1   • ticagrelor (BRILINTA) 90 MG Take 1 tablet (90 mg total) by mouth every 12 (twelve) hours 180 tablet 3     No current facility-administered medications for this visit  No Known Allergies   Immunizations: There is no immunization history on file for this patient  Health Maintenance:         Topic Date Due   • Colorectal Cancer Screening  Never done   • Hepatitis C Screening  Completed         Topic Date Due   • COVID-19 Vaccine (1) Never done   • Pneumococcal Vaccine: 65+ Years (1 - PCV) Never done   • Influenza Vaccine (1) Never done      Medicare Screening Tests and Risk Assessments:     Anuradha Linares is here for his Subsequent Wellness visit  Last Medicare Wellness visit information reviewed, patient interviewed and updates made to the record today  Health Risk Assessment:   Patient rates overall health as good  Patient feels that their physical health rating is much better  Patient is very satisfied with their life  Eyesight was rated as same  Hearing was rated as same  Patient feels that their emotional and mental health rating is slightly worse  Patients states they are never, rarely angry  Patient states they are never, rarely unusually tired/fatigued  Pain experienced in the last 7 days has been none  Patient states that he has experienced no weight loss or gain in last 6 months  Fall Risk Screening:    In the past year, patient has experienced: no history of falling in past year      Home Safety:  Patient does not have trouble with stairs inside or outside of their home  Patient has working smoke alarms and has working carbon monoxide detector  Home safety hazards include: none  Nutrition:   Current diet is Regular  Medications:   Patient is not currently taking any over-the-counter supplements  Patient is able to manage medications  Activities of Daily Living (ADLs)/Instrumental Activities of Daily Living (IADLs):   Walk and transfer into and out of bed and chair?: Yes  Dress and groom yourself?: Yes    Bathe or shower yourself?: Yes    Feed yourself?  Yes  Do your laundry/housekeeping?: Yes  Manage your money, pay your bills and track your expenses?: Yes  Make your own meals?: Yes    Do your own shopping?: Yes    Previous Hospitalizations:   Any hospitalizations or ED visits within the last 12 months?: Yes    How many hospitalizations have you had in the last year?: 1-2    Advance Care Planning:   Living will: No    Durable POA for healthcare: No    Advanced directive: No    Five wishes given: Yes      Cognitive Screening:   Provider or family/friend/caregiver concerned regarding cognition?: No    PREVENTIVE SCREENINGS      Cardiovascular Screening:    General: Screening Not Indicated and History Lipid Disorder      Diabetes Screening:     General: Screening Current      Colorectal Cancer Screening:     General: Patient Declines      Prostate Cancer Screening:    General: Screening Current      Abdominal Aortic Aneurysm (AAA) Screening:    Risk factors include: age between 73-67 yo and tobacco use        Lung Cancer Screening:     General: Screening Not Indicated      Hepatitis C Screening:    General: Screening Current    Screening, Brief Intervention, and Referral to Treatment (SBIRT)    Screening  Typical number of drinks in a day: 0  Typical number of drinks in a week: 0  Interpretation: Low risk drinking behavior  Single Item Drug Screening:  How often have you used an illegal drug (including marijuana) or a prescription medication for non-medical reasons in the past year? never    Single Item Drug Screen Score: 0  Interpretation: Negative screen for possible drug use disorder    No exam data present     Physical Exam:     /80 (BP Location: Left arm, Patient Position: Sitting, Cuff Size: Adult)   Pulse 68   Temp 97 7 °F (36 5 °C) (Temporal)   Ht 5' 10" (1 778 m)   Wt 77 1 kg (170 lb)   SpO2 98%   BMI 24 39 kg/m²     Physical Exam  Constitutional:       General: He is not in acute distress  Appearance: Normal appearance  He is normal weight  He is not ill-appearing, toxic-appearing or diaphoretic  Cardiovascular:      Rate and Rhythm: Normal rate and regular rhythm  Pulses: Normal pulses  Heart sounds: Normal heart sounds  No murmur heard  No gallop  Pulmonary:      Effort: Pulmonary effort is normal  No respiratory distress  Breath sounds: Normal breath sounds  No stridor  No wheezing, rhonchi or rales  Chest:      Chest wall: No tenderness  Musculoskeletal:         General: Normal range of motion  Skin:     General: Skin is warm and dry  Capillary Refill: Capillary refill takes less than 2 seconds  Neurological:      General: No focal deficit present  Mental Status: He is alert            Vera Gallegos MD

## 2022-10-12 ENCOUNTER — CLINICAL SUPPORT (OUTPATIENT)
Dept: CARDIAC REHAB | Age: 72
End: 2022-10-12
Payer: COMMERCIAL

## 2022-10-12 DIAGNOSIS — I21.4 NSTEMI (NON-ST ELEVATED MYOCARDIAL INFARCTION) (HCC): ICD-10-CM

## 2022-10-12 PROCEDURE — 93798 PHYS/QHP OP CAR RHAB W/ECG: CPT

## 2022-10-14 ENCOUNTER — CLINICAL SUPPORT (OUTPATIENT)
Dept: CARDIAC REHAB | Age: 72
End: 2022-10-14
Payer: COMMERCIAL

## 2022-10-14 DIAGNOSIS — I21.4 NSTEMI (NON-ST ELEVATED MYOCARDIAL INFARCTION) (HCC): ICD-10-CM

## 2022-10-14 PROCEDURE — 93798 PHYS/QHP OP CAR RHAB W/ECG: CPT

## 2022-10-17 ENCOUNTER — CLINICAL SUPPORT (OUTPATIENT)
Dept: CARDIAC REHAB | Age: 72
End: 2022-10-17
Payer: COMMERCIAL

## 2022-10-17 DIAGNOSIS — I21.4 NSTEMI (NON-ST ELEVATED MYOCARDIAL INFARCTION) (HCC): ICD-10-CM

## 2022-10-17 PROCEDURE — 93798 PHYS/QHP OP CAR RHAB W/ECG: CPT

## 2022-10-19 ENCOUNTER — CLINICAL SUPPORT (OUTPATIENT)
Dept: CARDIAC REHAB | Age: 72
End: 2022-10-19
Payer: COMMERCIAL

## 2022-10-19 DIAGNOSIS — I21.4 NSTEMI (NON-ST ELEVATED MYOCARDIAL INFARCTION) (HCC): Primary | ICD-10-CM

## 2022-10-19 PROCEDURE — 93798 PHYS/QHP OP CAR RHAB W/ECG: CPT

## 2022-10-19 NOTE — PROGRESS NOTES
Cardiac Rehabilitation Plan of Care   60 Day Reassessment          Today's date: 10/19/2022   # of Exercise Sessions Completed: 18  Patient name: Will Seip      : 1950  Age: 67 y o  MRN: [id number]  Referring Physician: Heidy Starr MD  Cardiologist: Charlee Keller MD  Provider: Marybeth Hoff  Clinician: Sandi Gracia MS      Dx:   Encounter Diagnosis   Name Primary? • NSTEMI (non-ST elevated myocardial infarction) [facility])      Date of onset: 22      SUMMARY OF PROGRESS:    10/19/22 - Lucy John is compliant attending cardiac rehab exercise sessions 3x/wk  He tolerates 40-45 mins at 2 8 - 3 9 METs plus wt training  He is tolerating progression of intensity levels to maintain RPE 4-6  Resting BP  126/64 - 144/66 with appropriate response to exercise reaching 134/64 - 168/72  NSR on tele with rare non conducted beats observed  RHR 68 - 86 ExHR 95 - 112  He has added home exercise 3 days/wk which includes physical activity around the house as well as walking  No cardiac complaints  Maintaining current weight of 166 lbs  Patient has been working on  dietary modifications with the goal of rare red/processed meats, low fat dairy, reduced added sugars and refined flours  The patient is a current smoker who is not ready to quit  He does not want to quit at this time  Patient reports excellent social/emotional support  Patient attends group educational classes on cardiac risk factor modification  His exercise program will be progressed as tolerated to maintain RPE 4-6  The patient has the following personal goals he hopes to achieved by discharge: improved/controlled BP  Pt will continue to be educated on lifestyle modifications and encouraged to supplement with a home exercise program to reach the following goals in the next 30 days: Increase overall energy levels  Will continue to monitor  22: Lucy John is compliant attending cardiac rehab exercise sessions 3x/wk   He tolerates 33-40 mins at 2 4 - 3 1 METs  He is tolerating progression of intensity levels to maintain RPE 4-6  Resting BP  126/64 - 148/70 with appropriate response to exercise reaching 138/74- 182/76  NSR on tele with PACS observed  RHR 65 - 82 ExHR 93 - 108  He has added home exercise daily days/wk which includes outdoor walking for 30-60mins  No cardiac complaints  Current MH of PAD; claudication reducing and exercise duration increasing  He states more conssitent BP, he is able to walk farther before onset of claudication and has increased energy  He is progressing toward wt loss goals with a loss of 3 pounds  Patient has been working on  dietary modifications with the goal of rare red/processed meats, low fat dairy, reduced added sugars and refined flours  The patient is a smoker ( 25ppd x 47 years)  He does not want to quit at this time  Patient attends group educational classes on cardiac risk factor modification  His exercise program will be progressed as tolerated to maintain RPE 4-6  The patient has the following personal goals he hopes to achieved by discharge: improved/controlled BP  Pt will continue to be educated on lifestyle modifications and encouraged to supplement with a home exercise program to reach his goals  Will continue to monitor  9/1/22: Today is Josh's initial evaluation to begin Cardiac Rehab post NSTEMI, ALLEN x 3 to mLCX, mRCA, dRCA   The patient does not currently follow a formal exercise program at home  He has resumed all ADLs without limitations  Depression screening using the PHQ-9 interprets the patient's score of  0  as  0 =No Depression  CHERELLE-7 screening tool for anxiety suggests 0  0-4  = Not anxious  When addressed, the patient denies having depression/anxiety  Patient reports sufficient social/emotional support from his daughter when needed as well as his girlfriend     Information to begin using [employer] [address] was provided as well as contact information for counseling through [facility]  PHQ-9 score will be reassessed in 30 days  The patient is a current smoker who is not ready to quit and current smoker but has reduced the number of cigarettes to 0 25 ppd per day  He does not want to quit at this time, was given materials on tobacco cessation programs/services and was counseled on effects of smoking  Patient admits to 100% medication compliance  He has added supplements he purchased on the Internet which he reports  Helps BP control  The patient completed an initial submaximal TM ETT  The patient completed 1 minutes of stage 3 (3 6METs) with test termination of RPE 6  The incline protocol was adjusted to take into account his PAD  Resting  /50 with appropriate hemodynamic response to exercise reaching 158/68  Patient denied symptoms during exercise  Telemetry revealed NSR  Patient was counseled on exercise guidelines to achieve a minimum of 150 mins/wk of moderate intensity (RPE 4-6) exercise and encouraged to add 1-2 days of exercise on opposite days of cardiac rehab as tolerated  We discussed current dietary habits and goals of heart healthy eating for lipid management  Patient's goals include: improved/controlled BP control  The patient's CAD risk factors include:  inactivity, stress, hypertension, hyperlipidemia and smoking  His education will focus on lifestyle modification/education specific to His needs  Patient will attend group education classes on heart healthy eating, reading food labels, stress management, risk factor reduction, understanding heart disease and common heart medications  Patient will attend 35 monitored exercise sessions, 3x/wk for 12-18 weeks beginning Sept 7, 2022         Medication compliance: Yes   Comments: Pt reports to be compliant with medications  Fall Risk: Low    Comments: Ambulates with a steady gait with no assist device and Denies a fall in the past 6 months    EKG Interpretation: NSR      EXERCISE ASSESSMENT and PLAN    Exercise Prescription:      Frequency: 3 days/week   Supplement with home exercise 2+ days/wk as tolerated       Minutes: 40         METS: 2 8-3 9          HR:    RPE: 4-5         Modalities: Treadmill, Airdyne bike, UBE and Recumbent bike      30 Day Goals for Exercise Progression:    Frequency: 3 days/week of cardiac rehab       Supplement with home exercise 2+ days/wk as tolerated    Minutes: 40                              >150 mins/wk of moderate intensity exercise   METS: 4 0-4 8   HR:     RPE: 4-6   Modalities: Treadmill, Airdyne bike, UBE, Lifecycle and Rower    Strength trainin-3 days / week  12-15 repetitions  1-2 sets per modality   Will be added following 2-3 weeks of monitored exercise sessions   Modalities: Leg Press, Chest Press, Pull Downs and Lateral Raise    Home Exercise: Type: walking, Frequency: daily days/week, Duration: 30-60 mins    Goals: 10% improvement in functional capacity - based on max METs achieved in fitness assessment, Increase in exercise capacity by 40% - based on peak METs tolerated in cardiac rehab exercise session, Exercise 5 days/wk, >150mins/wk of moderate intensity exercise, Attend Rehab regularly, start a home exercise program and increased time of onset of claudication    Progression Toward Goals:  Pt is progressing and showing improvement  toward the following goals:  10% improvement in functional capacity - based on max METs achieved in fitness assessment, Increase in exercise capacity by 40% - based on peak METs tolerated in cardiac rehab exercise session, Exercise 5 days/wk, >150mins/wk of moderate intensity exercise, Attend Rehab regularly, start a home exercise program and increased time of onset of claudication  , Patient will EDU: RF for Heart Disease in the next 30 days, Will continue to educate and progress as tolerated  Continued home walking      Education: benefit of exercise for CAD risk factors, AHA guidelines to achieve >150 mins/wk of moderate exercise and RPE scale   Plan:education on home exercise guidelines, home exercise 30+ mins 2 days opposite CR and Education class: Risk Factors for Heart Disease  Readiness to change: Action:  (Changing behavior)      NUTRITION ASSESSMENT AND PLAN    Weight control:    Starting weight: 167   Current weight:   166  Diabetes: N/A  Lipid management: Discussed diet and lipid management and Last lipid profile 6/28/22  Chol 184    HDL 41      Goals:LDL <100, Improved Rate Your Plate score  >09, choose lean meat (93-95%), eliminate processed meats, reduce portion sizes of meat to 3oz or less, increase intake of meatless meals, use olive or canola oil in baking, choose low sodium processed foods, eliminate butter, Increase intake of nuts and seeds, seldom eat or choose low fat ice-cream, fruit juice bars or frozen yogurt , eliminate or choose low-fat sweets and daily saturated fat intake <7%/13g    Progression Toward Goals: Pt is progressing and showing improvement  toward the following goals:  eliminated salt   , Pt has not made progress toward the following goals: reducing saturated fats  , Will continue to educate and progress as tolerated  Continues to make healthier dietary choices      Education: heart healthy eating  low sodium diet  hydration  nutrition for  lipid management  Plan: Education class: Reading Food Labels, Education Class: Heart Healthy Eating, switch to low fat cheeses, replace butter with soft spreads made with olive oil, canola or yogurt, reduce portion sizes, reduce red meat 1x/wk, avoid processed foods, drink more water, learn how to read food labels, replace sugar with stevia or truvia and keep added daily sugar <25g/day  Readiness to change: Action:  (Changing behavior)      PSYCHOSOCIAL ASSESSMENT AND PLAN    Emotional:  Depression assessment:  PHQ-9 = 0  0 =No Depression            Anxiety measure:  CHERELLE-7 = 0  0-4  = Not anxious  Self-reported stress level:  5  Social support: Very Good and Patient reports excellent emotional/social support from family    Goals:  Overall Health in [place] Score < 3    Progression Toward Goals: Pt is progressing and showing improvement  toward the following goals:  Overall Health in [place] Score < 3   , Patient will EDU: Stress/Relaxation in the next 30 days, Will continue to educate and progress as tolerated  Education: benefits of a positive support system and stress management techniques  Plan: Class: Stress and Your Health, Class: Relaxation, Spend time outdoors and gardening, building projects, help daughter with cookie company  Readiness to change: Action:  (Changing behavior)      OTHER CORE COMPONENTS     Tobacco:   Social History     Tobacco Use   Smoking Status Heavy Tobacco Smoker   • Packs/day: 0 50   • Types: Cigarettes   • Start date: 3/11/1975   Smokeless Tobacco Never Used       Tobacco Use Intervention:   Brief counseling by cardiac rehab professional  Date: 22  [place] Quit Line 800-QUIT-NOW  Pt continues to smoke 0 25ppd   Pt is not ready to quit    Anginal Symptoms:  chest  pain and pain radiating down both arms   NTG use: Compliant with carrying NTG, Understands proper use, Reviewed Proper use and Pt has not used NTG since event    Blood pressure:    Restin/64 - 144/66   Exercise: 134/64 - 168/72    Goals: reduced dietary sodium <2300mg, moderate intensity exercise >150 mins/wk, medication compliance and reduce number of cigarettes/day    Progression Toward Goals: Pt is progressing and showing improvement  toward the following goals:  reduced dietary sodium <2300mg, moderate intensity exercise >150 mins/wk, medication compliance and reduce number of cigarettes/day   , Will continue to educate and progress as tolerated  Continues to make proper adjustments      Education:  low sodium diet and HTN, smoking and heart disease and provide information on tobacco cessation treatment  Plan: Class: Understanding Heart Disease, Class: Common Heart Medications, reduce number of cigarettes per day, Avoid Processed foods, engage in regular exercise, eliminate salt shaker at the table, use salt substitutes, check labels for sodium content and monitor home BP  Readiness to change: Action:  (Changing behavior)

## 2022-10-21 ENCOUNTER — CLINICAL SUPPORT (OUTPATIENT)
Dept: CARDIAC REHAB | Age: 72
End: 2022-10-21
Payer: COMMERCIAL

## 2022-10-21 DIAGNOSIS — I21.4 NSTEMI (NON-ST ELEVATED MYOCARDIAL INFARCTION) (HCC): ICD-10-CM

## 2022-10-21 PROCEDURE — 93798 PHYS/QHP OP CAR RHAB W/ECG: CPT

## 2022-10-24 ENCOUNTER — CLINICAL SUPPORT (OUTPATIENT)
Dept: CARDIAC REHAB | Age: 72
End: 2022-10-24
Payer: COMMERCIAL

## 2022-10-24 DIAGNOSIS — I21.4 NSTEMI (NON-ST ELEVATED MYOCARDIAL INFARCTION) (HCC): ICD-10-CM

## 2022-10-24 PROCEDURE — 93798 PHYS/QHP OP CAR RHAB W/ECG: CPT

## 2022-10-28 ENCOUNTER — CLINICAL SUPPORT (OUTPATIENT)
Dept: CARDIAC REHAB | Age: 72
End: 2022-10-28
Payer: COMMERCIAL

## 2022-10-28 DIAGNOSIS — I21.4 NSTEMI (NON-ST ELEVATED MYOCARDIAL INFARCTION) (HCC): ICD-10-CM

## 2022-10-28 PROCEDURE — 93798 PHYS/QHP OP CAR RHAB W/ECG: CPT

## 2022-10-31 ENCOUNTER — CLINICAL SUPPORT (OUTPATIENT)
Dept: CARDIAC REHAB | Age: 72
End: 2022-10-31

## 2022-10-31 DIAGNOSIS — I21.4 NSTEMI (NON-ST ELEVATED MYOCARDIAL INFARCTION) (HCC): ICD-10-CM

## 2022-11-02 ENCOUNTER — CLINICAL SUPPORT (OUTPATIENT)
Dept: CARDIAC REHAB | Age: 72
End: 2022-11-02

## 2022-11-02 DIAGNOSIS — I21.4 NSTEMI (NON-ST ELEVATED MYOCARDIAL INFARCTION) (HCC): ICD-10-CM

## 2022-11-04 ENCOUNTER — CLINICAL SUPPORT (OUTPATIENT)
Dept: CARDIAC REHAB | Age: 72
End: 2022-11-04

## 2022-11-04 DIAGNOSIS — I21.4 NSTEMI (NON-ST ELEVATED MYOCARDIAL INFARCTION) (HCC): ICD-10-CM

## 2022-11-07 ENCOUNTER — CLINICAL SUPPORT (OUTPATIENT)
Dept: CARDIAC REHAB | Age: 72
End: 2022-11-07

## 2022-11-07 DIAGNOSIS — I21.4 NSTEMI (NON-ST ELEVATED MYOCARDIAL INFARCTION) (HCC): ICD-10-CM

## 2022-11-08 ENCOUNTER — CLINICAL SUPPORT (OUTPATIENT)
Dept: CARDIAC REHAB | Age: 72
End: 2022-11-08

## 2022-11-08 DIAGNOSIS — I21.4 NSTEMI (NON-ST ELEVATED MYOCARDIAL INFARCTION) (HCC): Primary | ICD-10-CM

## 2022-11-09 ENCOUNTER — APPOINTMENT (OUTPATIENT)
Dept: CARDIAC REHAB | Age: 72
End: 2022-11-09

## 2022-11-10 ENCOUNTER — CLINICAL SUPPORT (OUTPATIENT)
Dept: CARDIAC REHAB | Age: 72
End: 2022-11-10

## 2022-11-10 DIAGNOSIS — I21.4 NSTEMI (NON-ST ELEVATED MYOCARDIAL INFARCTION) (HCC): Primary | ICD-10-CM

## 2022-11-11 ENCOUNTER — APPOINTMENT (OUTPATIENT)
Dept: CARDIAC REHAB | Age: 72
End: 2022-11-11

## 2022-11-14 ENCOUNTER — CLINICAL SUPPORT (OUTPATIENT)
Dept: CARDIAC REHAB | Age: 72
End: 2022-11-14

## 2022-11-14 DIAGNOSIS — I21.4 NSTEMI (NON-ST ELEVATED MYOCARDIAL INFARCTION) (HCC): ICD-10-CM

## 2022-11-14 NOTE — PROGRESS NOTES
Cardiac Rehabilitation Plan of Care   90 Day Reassessment          Today's date: 2022   # of Exercise Sessions Completed: 29  Patient name: Opal Vincent      : 1950  Age: 67 y o  MRN: [id number]  Referring Physician: Gisselle Acuna MD  Cardiologist: Charlene Radford MD  Provider: Lee Cannon  Clinician: Kamar Chavez, MS, CEP, CCRP      Dx:   Encounter Diagnosis   Name Primary? • NSTEMI (non-ST elevated myocardial infarction) [facility])      Date of onset: 22      SUMMARY OF PROGRESS:  Kye Cedeño is compliant attending cardiac rehab exercise sessions 3x/wk  He tolerates 40-45 mins at 3 1 - 4 4 METs plus wt training  He is tolerating progression of intensity levels to maintain RPE 4-6  Kye Cedeño denies cardiac symptoms  He does not carry his NTG and feels it is not necessary  Resting /62 - 156/64 with appropriate response to exercise reaching 152/78 - 180/70  NSR on tele  RHR 63-76, ExHR 100-122  He continues to take daily 30 min walks outside  He reports that he can walk further until the onset of claudication  No cardiac complaints  Maintaining current weight of 166 lbs  Kye Cedeño reports he does not eat processed foods  Kye Cedeño continues to smoke 0 5ppd (down from 1 ppd) and does not want to quit  Attempted to educate Kye Cedeño on effects of smoking  However, he refuses to listen and believes smoking is not a detriment to his health  He states the facts of detrimental effects are exaggerated by the government and health insurance companies  He does not want to quit at this time  Patient reports sufficient social/emotional support from his daughter and son in law  Patient attended all of the  group educational classes on cardiac risk factor modification  His exercise program will be progressed as tolerated to maintain RPE 4-6  The patient has the following personal goals he hopes to achieved by discharge: improved/controlled BP, increased walking duration before onset of claudication     Pt will continue to be educated on lifestyle modifications and encouraged to supplement with a home exercise program to reach the following goals in the next 30 days: Increase overall energy levels, maintain consistent home walking  Will continue to monitor  10/19/22 - Nesha Magdaleno is compliant attending cardiac rehab exercise sessions 3x/wk  He tolerates 40-45 mins at 2 8 - 3 9 METs plus wt training  He is tolerating progression of intensity levels to maintain RPE 4-6  Resting BP  126/64 - 144/66 with appropriate response to exercise reaching 134/64 - 168/72  NSR on tele with rare non conducted beats observed  RHR 68 - 86 ExHR 95 - 112  He has added home exercise 3 days/wk which includes physical activity around the house as well as walking  No cardiac complaints  Maintaining current weight of 166 lbs  Patient has been working on  dietary modifications with the goal of rare red/processed meats, low fat dairy, reduced added sugars and refined flours  The patient is a current smoker who is not ready to quit  He does not want to quit at this time  Patient reports excellent social/emotional support  Patient attends group educational classes on cardiac risk factor modification  His exercise program will be progressed as tolerated to maintain RPE 4-6  The patient has the following personal goals he hopes to achieved by discharge: improved/controlled BP  Pt will continue to be educated on lifestyle modifications and encouraged to supplement with a home exercise program to reach the following goals in the next 30 days: Increase overall energy levels  Will continue to monitor  9/26/22: Nesha Magdaleno is compliant attending cardiac rehab exercise sessions 3x/wk  He tolerates 33-40 mins at 2 4 - 3 1 METs  He is tolerating progression of intensity levels to maintain RPE 4-6  Resting BP  126/64 - 148/70 with appropriate response to exercise reaching 138/74- 182/76  NSR on tele with PACS observed  RHR 65 - 82 ExHR 93 - 108  He has added home exercise daily days/wk which includes outdoor walking for 30-60mins  No cardiac complaints  Current MH of PAD; claudication reducing and exercise duration increasing  He states more conssitent BP, he is able to walk farther before onset of claudication and has increased energy  He is progressing toward wt loss goals with a loss of 3 pounds  Patient has been working on  dietary modifications with the goal of rare red/processed meats, low fat dairy, reduced added sugars and refined flours  The patient is a smoker ( 25ppd x 47 years)  He does not want to quit at this time  Patient attends group educational classes on cardiac risk factor modification  His exercise program will be progressed as tolerated to maintain RPE 4-6  The patient has the following personal goals he hopes to achieved by discharge: improved/controlled BP  Pt will continue to be educated on lifestyle modifications and encouraged to supplement with a home exercise program to reach his goals  Will continue to monitor  9/1/22: Today is Josh's initial evaluation to begin Cardiac Rehab post NSTEMI, ALLEN x 3 to mLCX, mRCA, dRCA   The patient does not currently follow a formal exercise program at home  He has resumed all ADLs without limitations  Depression screening using the PHQ-9 interprets the patient's score of  0  as  0 =No Depression  CHERELLE-7 screening tool for anxiety suggests 0  0-4  = Not anxious  When addressed, the patient denies having depression/anxiety  Patient reports sufficient social/emotional support from his daughter when needed as well as his girlfriend  Information to begin using [employer] was provided as well as contact information for counseling through [facility]  PHQ-9 score will be reassessed in 30 days  The patient is a current smoker who is not ready to quit and current smoker but has reduced the number of cigarettes to 0 25 ppd per day   He does not want to quit at this time, was given materials on tobacco cessation programs/services and was counseled on effects of smoking  Patient admits to 100% medication compliance  He has added supplements he purchased on the Internet which he reports  Helps BP control  The patient completed an initial submaximal TM ETT  The patient completed 1 minutes of stage 3 (3 6METs) with test termination of RPE 6  The incline protocol was adjusted to take into account his PAD  Resting  /50 with appropriate hemodynamic response to exercise reaching 158/68  Patient denied symptoms during exercise  Telemetry revealed NSR  Patient was counseled on exercise guidelines to achieve a minimum of 150 mins/wk of moderate intensity (RPE 4-6) exercise and encouraged to add 1-2 days of exercise on opposite days of cardiac rehab as tolerated  We discussed current dietary habits and goals of heart healthy eating for lipid management  Patient's goals include: improved/controlled BP control  The patient's CAD risk factors include:  inactivity, stress, hypertension, hyperlipidemia and smoking  His education will focus on lifestyle modification/education specific to His needs  Patient will attend group education classes on heart healthy eating, reading food labels, stress management, risk factor reduction, understanding heart disease and common heart medications  Patient will attend 35 monitored exercise sessions, 3x/wk for 12-18 weeks beginning Sept 7, 2022         Medication compliance: Yes   Comments: Pt reports to be compliant with medications  Fall Risk: Low    Comments: Ambulates with a steady gait with no assist device and Denies a fall in the past 6 months    EKG Interpretation: NSR      EXERCISE ASSESSMENT and PLAN    Exercise Prescription:      Frequency: 3 days/week   Supplement with home exercise 2+ days/wk as tolerated       Minutes: 40         METS: 2 9-4 4         HR:    RPE: 4-5         Modalities: Treadmill, Airdyne bike, UBE and Recumbent bike      30 Day Goals for Exercise Progression:    Frequency: 3 days/week of cardiac rehab       Supplement with home exercise 2+ days/wk as tolerated    Minutes: 40                              >150 mins/wk of moderate intensity exercise   METS: 4 0-4 8   HR:     RPE: 4-6   Modalities: Treadmill, Airdyne bike, UBE, Lifecycle and Rower    Strength trainin-3 days / week  12-15 repetitions  1-2 sets per modality    Modalities: Leg Press, Chest Press, Pull Downs and Lateral Raise    Home Exercise: Type: walking, Frequency: daily days/week, Duration: 30-60 mins    Goals: 10% improvement in functional capacity - based on max METs achieved in fitness assessment, Increase in exercise capacity by 40% - based on peak METs tolerated in cardiac rehab exercise session, Exercise 5 days/wk, >150mins/wk of moderate intensity exercise, Attend Rehab regularly, start a home exercise program and increased time of onset of claudication    Progression Toward Goals:  Pt is progressing and showing improvement  toward the following goals:  10% improvement in functional capacity - based on max METs achieved in fitness assessment, Increase in exercise capacity by 40% - based on peak METs tolerated in cardiac rehab exercise session, Exercise 5 days/wk, >150mins/wk of moderate intensity exercise, Attend Rehab regularly, start a home exercise program and increased time of onset of claudication  , Patient will EDU: RF for Heart Disease in the next 30 days, Will continue to educate and progress as tolerated  Continued home walking      Education: benefit of exercise for CAD risk factors, home exercise guidelines, AHA guidelines to achieve >150 mins/wk of moderate exercise, RPE scale and class: Risk Factors for Heart Disease   Plan:education on home exercise guidelines and home exercise 30+ mins 2 days opposite CR  Readiness to change: Action:  (Changing behavior)      NUTRITION ASSESSMENT AND PLAN    Weight control:    Starting weight: 167   Current weight:   166  Diabetes: N/A  Lipid management: Discussed diet and lipid management and Last lipid profile 6/28/22  Chol 184    HDL 41      Goals:LDL <100, Improved Rate Your Plate score  >76, choose lean meat (93-95%), eliminate processed meats, reduce portion sizes of meat to 3oz or less, increase intake of meatless meals, use olive or canola oil in baking, choose low sodium processed foods, eliminate butter, Increase intake of nuts and seeds, seldom eat or choose low fat ice-cream, fruit juice bars or frozen yogurt , eliminate or choose low-fat sweets and daily saturated fat intake <7%/13g    Progression Toward Goals: Pt is progressing and showing improvement  toward the following goals:  eliminated salt   , Pt has not made progress toward the following goals: reducing saturated fats  , Will continue to educate and progress as tolerated  Continues to make healthier dietary choices      Education: heart healthy eating  low sodium diet  hydration  nutrition for  lipid management  Plan: Education class: Reading Food Labels, Education Class: Heart Healthy Eating, switch to low fat cheeses, replace butter with soft spreads made with olive oil, canola or yogurt, reduce portion sizes, reduce red meat 1x/wk, avoid processed foods, drink more water, learn how to read food labels, replace sugar with stevia or truvia and keep added daily sugar <25g/day  Readiness to change: Action:  (Changing behavior)      PSYCHOSOCIAL ASSESSMENT AND PLAN    Emotional:  Depression assessment:  PHQ-9 = 0  0 =No Depression            Anxiety measure:  CHERELLE-7 = 0  0-4  = Not anxious  Self-reported stress level:  5  Social support: Very Good and Patient reports excellent emotional/social support from family    Goals:  Overall Health in [place] Score < 3    Progression Toward Goals: Pt is progressing and showing improvement  toward the following goals:  Overall Health in [place] Score < 3   , Patient will EDU: Stress/Relaxation in the next 30 days, Will continue to educate and progress as tolerated  Education: benefits of a positive support system, stress management techniques, class:  Stress and Your Health  and class:  Relaxation  Plan: Spend time outdoors and gardening, building projects, help daughter with cookie company  Readiness to change: Action:  (Changing behavior)      OTHER CORE COMPONENTS     Tobacco:   Social History     Tobacco Use   Smoking Status Heavy Tobacco Smoker   • Packs/day: 0 50   • Types: Cigarettes   • Start date: 3/11/1975   Smokeless Tobacco Never Used       Tobacco Use Intervention:   Brief counseling by cardiac rehab professional  Date: 22, 22  [place] Quit Line 1-800-QUIT-NOW  Pt continues to smoke 0 5ppd   Pt is not ready to quit    Anginal Symptoms:  chest  pain and pain radiating down both arms   NTG use: Janeen Little does not carry his NTG  He feels it is not necessary    Blood pressure:    Restin/62 - 156/64   Exercise: 138/74- 182/76    Goals: reduced dietary sodium <2300mg, moderate intensity exercise >150 mins/wk, medication compliance and reduce number of cigarettes/day    Progression Toward Goals: Pt is progressing and showing improvement  toward the following goals:  reduced dietary sodium <2300mg, moderate intensity exercise >150 mins/wk, medication compliance  Janeen Little has not made progress toward: reduce number of cigarettes/day   , Will continue to educate and progress as tolerated  Continues to make proper adjustments      Education:  low sodium diet and HTN, Education class:  Common Heart Medications, Education class: Understanding Heart Disease, smoking and heart disease and provide information on tobacco cessation treatment  Plan: reduce number of cigarettes per day, Avoid Processed foods, engage in regular exercise, eliminate salt shaker at the table, use salt substitutes, check labels for sodium content and monitor home BP  Readiness to change: Action:  (Changing behavior)

## 2022-11-17 ENCOUNTER — CLINICAL SUPPORT (OUTPATIENT)
Dept: CARDIAC REHAB | Age: 72
End: 2022-11-17

## 2022-11-17 DIAGNOSIS — I21.4 NSTEMI (NON-ST ELEVATED MYOCARDIAL INFARCTION) (HCC): Primary | ICD-10-CM

## 2022-11-21 ENCOUNTER — APPOINTMENT (OUTPATIENT)
Dept: CARDIAC REHAB | Age: 72
End: 2022-11-21

## 2022-11-25 ENCOUNTER — APPOINTMENT (OUTPATIENT)
Dept: CARDIAC REHAB | Age: 72
End: 2022-11-25

## 2022-11-28 ENCOUNTER — APPOINTMENT (OUTPATIENT)
Dept: CARDIAC REHAB | Age: 72
End: 2022-11-28

## 2022-11-30 ENCOUNTER — APPOINTMENT (OUTPATIENT)
Dept: CARDIAC REHAB | Age: 72
End: 2022-11-30

## 2022-12-07 DIAGNOSIS — I43 CARDIOMYOPATHY DUE TO COVID-19 VIRUS (HCC): ICD-10-CM

## 2022-12-07 DIAGNOSIS — U07.1 CARDIOMYOPATHY DUE TO COVID-19 VIRUS (HCC): ICD-10-CM

## 2022-12-07 DIAGNOSIS — R77.8 ELEVATED TROPONIN LEVEL NOT DUE MYOCARDIAL INFARCTION: ICD-10-CM

## 2022-12-08 RX ORDER — LISINOPRIL 40 MG/1
40 TABLET ORAL DAILY
Qty: 90 TABLET | Refills: 1 | Status: SHIPPED | OUTPATIENT
Start: 2022-12-08

## 2022-12-17 ENCOUNTER — NURSE TRIAGE (OUTPATIENT)
Dept: OTHER | Facility: OTHER | Age: 72
End: 2022-12-17

## 2022-12-17 DIAGNOSIS — I10 PRIMARY HYPERTENSION: Primary | ICD-10-CM

## 2022-12-17 DIAGNOSIS — R77.8 ELEVATED TROPONIN LEVEL NOT DUE MYOCARDIAL INFARCTION: ICD-10-CM

## 2022-12-17 DIAGNOSIS — I10 PRIMARY HYPERTENSION: ICD-10-CM

## 2022-12-17 DIAGNOSIS — U07.1 CARDIOMYOPATHY DUE TO COVID-19 VIRUS (HCC): ICD-10-CM

## 2022-12-17 DIAGNOSIS — I43 CARDIOMYOPATHY DUE TO COVID-19 VIRUS (HCC): ICD-10-CM

## 2022-12-17 RX ORDER — CARVEDILOL 12.5 MG/1
12.5 TABLET ORAL 2 TIMES DAILY WITH MEALS
Qty: 14 TABLET | Refills: 0 | Status: SHIPPED | OUTPATIENT
Start: 2022-12-17 | End: 2022-12-24

## 2022-12-17 RX ORDER — CARVEDILOL 12.5 MG/1
12.5 TABLET ORAL 2 TIMES DAILY WITH MEALS
Qty: 14 TABLET | Refills: 0 | Status: SHIPPED | OUTPATIENT
Start: 2022-12-17 | End: 2022-12-17 | Stop reason: SDUPTHER

## 2022-12-17 RX ORDER — CARVEDILOL 12.5 MG/1
12.5 TABLET ORAL 2 TIMES DAILY WITH MEALS
Qty: 180 TABLET | Refills: 0 | Status: SHIPPED | OUTPATIENT
Start: 2022-12-17

## 2022-12-17 NOTE — TELEPHONE ENCOUNTER
Reason for Disposition  • [1] Caller requesting a prescription renewal (no refills left), no triage required, AND [2] triager able to renew prescription per department policy    Answer Assessment - Initial Assessment Questions  1  NAME of MEDICATION: "What medicine are you calling about?"      Coreg  2  QUESTION: "What is your question?" (e g , medication refill, side effect)      I am out as of today  3  PRESCRIBING HCP: "Who prescribed it?" Reason: if prescribed by specialist, call should be referred to that group  Dr Cassie Hebert  4  SYMPTOMS: "Do you have any symptoms?"      None  5  SEVERITY: If symptoms are present, ask "Are they mild, moderate or severe?"      No  6   PREGNANCY:  "Is there any chance that you are pregnant?" "When was your last menstrual period?"      N/A    Protocols used: MEDICATION QUESTION CALL-ADULT-

## 2022-12-17 NOTE — TELEPHONE ENCOUNTER
Regarding: Med refill  ----- Message from Magno Araujo sent at 12/17/2022 12:31 PM EST -----  "I'm all out of carvedilol (COREG) 12 5 mg tablet [[id number]]   I would like a refill sent to [facility] [address] [place] [address] , [address] [facility] [address], Phone:  [phone]  Fax:  [phone] "

## 2022-12-17 NOTE — TELEPHONE ENCOUNTER
Medication Refill Request     Name Coreg  Dose/Frequency 12 5mgs/1 tablet BID  Quantity 180  Verified pharmacy   [x]  Verified ordering Provider   [x]  Does patient have enough for the next 3 days?  Yes [] No [x]

## 2023-01-20 DIAGNOSIS — I10 PRIMARY HYPERTENSION: ICD-10-CM

## 2023-01-23 RX ORDER — AMLODIPINE BESYLATE 10 MG/1
10 TABLET ORAL DAILY
Qty: 90 TABLET | Refills: 0 | Status: SHIPPED | OUTPATIENT
Start: 2023-01-23

## 2023-04-05 ENCOUNTER — RA CDI HCC (OUTPATIENT)
Dept: OTHER | Facility: HOSPITAL | Age: 73
End: 2023-04-05

## 2023-04-05 DIAGNOSIS — I43 CARDIOMYOPATHY DUE TO COVID-19 VIRUS (HCC): ICD-10-CM

## 2023-04-05 DIAGNOSIS — U07.1 CARDIOMYOPATHY DUE TO COVID-19 VIRUS (HCC): ICD-10-CM

## 2023-04-05 DIAGNOSIS — R77.8 ELEVATED TROPONIN LEVEL NOT DUE MYOCARDIAL INFARCTION: ICD-10-CM

## 2023-04-05 NOTE — PROGRESS NOTES
[facility]  coding opportunities     I11 0     Chart Reviewed number of suggestions sent to Provider: 1     Patients Insurance     Medicare Insurance: [address] [place] [address]

## 2023-04-06 RX ORDER — CARVEDILOL 12.5 MG/1
12.5 TABLET ORAL 2 TIMES DAILY WITH MEALS
Qty: 180 TABLET | Refills: 0 | Status: SHIPPED | OUTPATIENT
Start: 2023-04-06 | End: 2023-04-07 | Stop reason: SDUPTHER

## 2023-04-07 DIAGNOSIS — I43 CARDIOMYOPATHY DUE TO COVID-19 VIRUS (HCC): ICD-10-CM

## 2023-04-07 DIAGNOSIS — U07.1 CARDIOMYOPATHY DUE TO COVID-19 VIRUS (HCC): ICD-10-CM

## 2023-04-07 DIAGNOSIS — R77.8 ELEVATED TROPONIN LEVEL NOT DUE MYOCARDIAL INFARCTION: ICD-10-CM

## 2023-04-08 RX ORDER — CARVEDILOL 12.5 MG/1
12.5 TABLET ORAL 2 TIMES DAILY WITH MEALS
Qty: 60 TABLET | Refills: 0 | Status: SHIPPED | OUTPATIENT
Start: 2023-04-08 | End: 2023-05-08

## 2023-05-11 DIAGNOSIS — R77.8 ELEVATED TROPONIN LEVEL NOT DUE MYOCARDIAL INFARCTION: ICD-10-CM

## 2023-05-11 DIAGNOSIS — U07.1 CARDIOMYOPATHY DUE TO COVID-19 VIRUS (HCC): ICD-10-CM

## 2023-05-11 DIAGNOSIS — I43 CARDIOMYOPATHY DUE TO COVID-19 VIRUS (HCC): ICD-10-CM

## 2023-05-11 RX ORDER — CARVEDILOL 12.5 MG/1
12.5 TABLET ORAL 2 TIMES DAILY WITH MEALS
Qty: 180 TABLET | Refills: 0 | Status: SHIPPED | OUTPATIENT
Start: 2023-05-11 | End: 2023-08-09

## 2023-05-15 NOTE — TELEPHONE ENCOUNTER
10/12/23  4/11/23  Patient lvm on rx line requesting one of his medications- looks like they were all updated with quantities and a refill

## 2023-07-12 ENCOUNTER — HOSPITAL ENCOUNTER (EMERGENCY)
Facility: HOSPITAL | Age: 73
Discharge: HOME | End: 2023-07-12
Attending: EMERGENCY MEDICINE
Payer: COMMERCIAL

## 2023-07-12 ENCOUNTER — APPOINTMENT (EMERGENCY)
Dept: CT IMAGING | Facility: HOSPITAL | Age: 73
End: 2023-07-12
Payer: COMMERCIAL

## 2023-07-12 VITALS
RESPIRATION RATE: 18 BRPM | DIASTOLIC BLOOD PRESSURE: 64 MMHG | HEART RATE: 68 BPM | SYSTOLIC BLOOD PRESSURE: 141 MMHG | OXYGEN SATURATION: 95 % | TEMPERATURE: 98.5 F

## 2023-07-12 DIAGNOSIS — J43.2 CENTRILOBULAR EMPHYSEMA (HCC): ICD-10-CM

## 2023-07-12 DIAGNOSIS — I77.819 AORTIC ECTASIA (HCC): ICD-10-CM

## 2023-07-12 DIAGNOSIS — R05.3 CHRONIC COUGH: Primary | ICD-10-CM

## 2023-07-12 DIAGNOSIS — J40 BRONCHITIS: ICD-10-CM

## 2023-07-12 PROCEDURE — G1004 CDSM NDSC: HCPCS

## 2023-07-12 PROCEDURE — 71250 CT THORAX DX C-: CPT

## 2023-07-12 RX ORDER — BENZONATATE 100 MG/1
100 CAPSULE ORAL EVERY 8 HOURS PRN
Qty: 21 CAPSULE | Refills: 0 | Status: SHIPPED | OUTPATIENT
Start: 2023-07-12

## 2023-07-12 RX ORDER — AZITHROMYCIN 250 MG/1
TABLET, FILM COATED ORAL
Qty: 6 TABLET | Refills: 0 | Status: SHIPPED | OUTPATIENT
Start: 2023-07-12

## 2023-07-12 NOTE — DISCHARGE INSTRUCTIONS
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  Recommend follow-up with a chest CT with no contrast in 1 year."

## 2023-07-12 NOTE — ED PROVIDER NOTES
History  Chief Complaint   Patient presents with   • Cough     Pt presents to the ED with c/o cough that has been occurring for approximately 6 months. Pt reports no other symptoms/complaints. Pt takes lisinopril and feels that this may be the cause to his constant coughing. Patient is a 65-year-old male with a PMHx of HLD, HTN, hx of NSTEMI s/p PCI and PAD, presenting to the ED for evaluation of a dry cough x8 months. Patient states that he has had a persistent dry cough over the past 8 months. He denies any associated chest pain, shortness of breath or lower extremity edema. He denies any hemoptysis, calf pain/swelling, recent travel, recent surgery or history of PE/DVT. He states that over the weekend he developed a "cold" and had increased frequency of cough associated with a runny nose/congestion. He denies any fevers, chills or sputum production. He states that his cold symptoms have now resolved but he continues to have a persistent cough. He states that he has done some research and feels that his Lisinopril is the cause of his chronic cough. He states that he was initially started on lisinopril 1 year ago and the cough started about 3 to 4 months later. He states that he mentioned this to his PCP and was told this would go away with time. He is a current 0.5 pack per day smoker and says he has been smoking for over 50 years. Prior to Admission Medications   Prescriptions Last Dose Informant Patient Reported? Taking?    [facility] Aspirin Adult Low Dose 81 MG chewable tablet  Self No No   Sig: CHEW 1 TABLET BY MOUTH DAILY   amLODIPine (NORVASC) 10 mg tablet   No No   Sig: Take 1 tablet (10 mg total) by mouth daily   atorvastatin (LIPITOR) 40 mg tablet   No No   Sig: Take 1 tablet (40 mg total) by mouth daily with dinner   carvedilol (COREG) 12.5 mg tablet   No No   Sig: Take 1 tablet (12.5 mg total) by mouth 2 (two) times a day with meals   furosemide (LASIX) 20 mg tablet   No No   Sig: Take 1 tablet (20 mg total) by mouth daily as needed (weight gain more than 3 pounds)   lisinopril (ZESTRIL) 40 mg tablet   No No   Sig: Take 1 tablet (40 mg total) by mouth daily   nitroglycerin (NITROSTAT) 0.4 mg SL tablet  Self No No   Sig: Place 1 tablet (0.4 mg total) under the tongue every 5 (five) minutes as needed for chest pain   ticagrelor (BRILINTA) 90 MG   No No   Sig: Take 1 tablet (90 mg total) by mouth every 12 (twelve) hours      Facility-Administered Medications: None       Past Medical History:   Diagnosis Date   • Hypertension        Past Surgical History:   Procedure Laterality Date   • ARTERY SURGERY  2018   • CARDIAC CATHETERIZATION N/A 6/28/2022    Procedure: CARDIAC CATHETERIZATION;  Surgeon: Richa Cabral MD;  [facility]; Service: Cardiology   • CARDIAC CATHETERIZATION N/A 6/28/2022    Procedure: Cardiac pci;  Surgeon: Richa Cabral MD;  [facility]; Service: Cardiology       Family History   Problem Relation Age of Onset   • Stomach cancer Mother    • No Known Problems Father      I have reviewed and agree with the history as documented. E-Cigarette/Vaping   • E-Cigarette Use Never User      E-Cigarette/Vaping Substances   • Nicotine No    • THC No    • CBD No    • Flavoring No    • Other No    • Unknown No      Social History     Tobacco Use   • Smoking status: Heavy Smoker     Packs/day: 0.50     Types: Cigarettes     Start date: 3/11/1975   • Smokeless tobacco: Never   Vaping Use   • Vaping Use: Never used   Substance Use Topics   • Alcohol use: Yes     Comment: ocassionally   • Drug use: Never       Review of Systems   Constitutional: Negative for chills, diaphoresis, fatigue and fever. HENT: Positive for rhinorrhea. Negative for congestion, ear pain, mouth sores, sinus pain, sore throat and trouble swallowing. Eyes: Negative for photophobia and visual disturbance. Respiratory: Positive for cough.  Negative for chest tightness, shortness of breath and wheezing. Cardiovascular: Negative for chest pain, palpitations and leg swelling. Gastrointestinal: Negative for abdominal pain, blood in stool, constipation, diarrhea, nausea and vomiting. Genitourinary: Negative for dysuria, flank pain, frequency, hematuria and urgency. Musculoskeletal: Negative for arthralgias, back pain, gait problem, joint swelling, myalgias and neck pain. Skin: Negative for color change, pallor and rash. Neurological: Negative for dizziness, syncope, speech difficulty, weakness, light-headedness, numbness and headaches. Psychiatric/Behavioral: Negative for confusion and sleep disturbance. All other systems reviewed and are negative. Physical Exam  Physical Exam  Vitals and nursing note reviewed. Constitutional:       General: He is awake. He is not in acute distress. Appearance: Normal appearance. He is well-developed. He is not ill-appearing or diaphoretic. HENT:      Head: Normocephalic and atraumatic. Right Ear: External ear normal.      Left Ear: External ear normal.      Nose: Nose normal.      Mouth/Throat:      Lips: Pink. Mouth: Mucous membranes are moist. No angioedema. Comments: No facial/oral swelling, no evidence of angioedema. Eyes:      General: Lids are normal. No scleral icterus. Conjunctiva/sclera: Conjunctivae normal.      Pupils: Pupils are equal, round, and reactive to light. Cardiovascular:      Rate and Rhythm: Normal rate and regular rhythm. Pulses: Normal pulses. Radial pulses are 2+ on the right side and 2+ on the left side. Heart sounds: Normal heart sounds, S1 normal and S2 normal.      Comments: No lower extremity edema. No calf tenderness. Pulmonary:      Effort: Pulmonary effort is normal. No accessory muscle usage. Breath sounds: Normal breath sounds. No stridor. No decreased breath sounds, wheezing, rhonchi or rales.       Comments: Lungs clear and equal to auscultation bilaterally. No wheezing, rhonchi or rales. No tachypnea, accessory muscle usage or retractions. SPO2 95% on room air. Abdominal:      General: Abdomen is flat. Bowel sounds are normal. There is no distension. Palpations: Abdomen is soft. Tenderness: There is no abdominal tenderness. There is no right CVA tenderness, left CVA tenderness, guarding or rebound. Musculoskeletal:      Cervical back: Full passive range of motion without pain, normal range of motion and neck supple. No signs of trauma. No pain with movement. Normal range of motion. Right lower leg: No edema. Left lower leg: No edema. Lymphadenopathy:      Cervical: No cervical adenopathy. Skin:     General: Skin is warm and dry. Capillary Refill: Capillary refill takes less than 2 seconds. Coloration: Skin is not cyanotic, jaundiced or pale. Neurological:      Mental Status: He is alert and oriented to person, place, and time. GCS: GCS eye subscore is 4. GCS verbal subscore is 5. GCS motor subscore is 6. Cranial Nerves: No dysarthria or facial asymmetry. Gait: Gait normal.   Psychiatric:         Attention and Perception: Attention normal.         Mood and Affect: Mood normal.         Speech: Speech normal.         Behavior: Behavior normal. Behavior is cooperative.          Vital Signs  ED Triage Vitals [07/12/23 1224]   Temperature Pulse Respirations Blood Pressure SpO2   98.5 °F (36.9 °C) 81 16 166/70 94 %      Temp Source Heart Rate Source Patient Position - Orthostatic VS BP Location FiO2 (%)   Oral Monitor -- Right arm --      Pain Score       --           Vitals:    07/12/23 1224 07/12/23 1300 07/12/23 1440   BP: 166/70 114/56 141/64   Pulse: 81 66 68         Visual Acuity      ED Medications  Medications - No data to display    Diagnostic Studies  Results Reviewed     None                 CT chest without contrast   Final Result by Fabien Fontanez MD (07/12 1414)      Nothing to indicate malignancy. Mild diffuse bronchial wall thickening which may be due to smoking related bronchitis. 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 Recommend follow-up with a chest CT with no contrast in 1 year. Mild centrilobular emphysema. The study was marked in [facility] for immediate notification and follow-up         Workstation performed: [id number]                    Procedures  Procedures         ED Course                                             Medical Decision Making  Patient is a 78-year-old male with a PMHx of HLD, HTN, hx of NSTEMI s/p PCI and PAD, presenting to the ED for evaluation of a dry cough x8 months. Patient reports a chronic, persistent cough over the past 8 months. He denies any chest pain, pleuritic pain, shortness of breath, hemoptysis, calf pain/swelling, recent travel, recent surgery or history of PE/DVT. I do not have concern for PE at this time. He additionally has no chest pain to suggest ACS/MI. He is euvolemic with no symptoms of CHF. No signs/symptoms of angioedema. CT chest obtained to rule out malignancy given patient's extensive smoking history. CT shows bronchitis, mild ectasia of the ascending aorta and centrilobular emphysema but otherwise unremarkable. I suspect patient's symptoms are secondary to the lisinopril and are acutely exacerbated by bronchitis. Will treat with a course of azithromycin given patient's history of smoking and emphysema. I advised patient to schedule an appointment as soon as possible with his PCP to discuss discontinuation of his lisinopril and switching to an alternative blood pressure medication. Patient was instructed to return to the ED if he developed any worsening cough, fevers, chest pain or shortness of breath. The management plan was discussed in detail with the patient at bedside and all questions were answered. Strict ED return instructions were discussed at bedside.  Prior to discharge, both verbal and written instructions were provided. We discussed the signs and symptoms that should prompt the patient to return to the ED. All questions were answered and the patient was comfortable with the plan of care and discharged home. The patient agrees to return to the Emergency Department for concerns and/or progression of illness. Aortic ectasia [facility]):     Details: Informed patient of this CT finding and need for close monitoring as an outpatient. Bronchitis: acute illness or injury  Centrilobular emphysema ([address]): chronic illness or injury  Amount and/or Complexity of Data Reviewed  Radiology: ordered. Risk  Prescription drug management. Disposition  Final diagnoses:   Chronic cough   Bronchitis   Centrilobular emphysema ([address])   Aortic ectasia ([address])     Time reflects when diagnosis was documented in both MDM as applicable and the Disposition within this note     Time User Action Codes Description Comment    7/12/2023  2:18 PM Mellissa Fung Add [R05.3] Chronic cough     7/12/2023  2:18 PM Danielle Freeman Add [J40] Bronchitis     7/12/2023  2:19 PM Danielle Freeman Add [J43.2] Centrilobular emphysema ([address])     7/12/2023  2:19 PM Mellissa Dew Add [I77.819] Aortic ectasia [facility])       ED Disposition     ED Disposition   Discharge    Condition   Stable    Date/Time   Wed Jul 12, 2023  2:20 PM    [facility] discharge to home/self care.                Follow-up Information     Follow up With Specialties Details Why Contact Info Additional Information    Jaison Clements MD Internal Medicine Schedule an appointment as soon as possible for a visit in 2 days Re-evaluation 1 [facility] [id number]  hospitals Emergency Department Emergency Medicine  If symptoms worsen [address] Emergency Department, [place], [place], [address]          Discharge Medication List as of 7/12/2023  2:32 PM      START taking these medications    Details   azithromycin (ZITHROMAX) 250 mg tablet Take 2 tablets today then 1 tablet daily x 4 days, Normal      benzonatate (TESSALON PERLES) 100 mg capsule Take 1 capsule (100 mg total) by mouth every 8 (eight) hours as needed for cough, Starting Wed 7/12/2023, Normal         CONTINUE these medications which have NOT CHANGED    Details   amLODIPine (NORVASC) 10 mg tablet Take 1 tablet (10 mg total) by mouth daily, Starting Tue 4/11/2023, Normal      atorvastatin (LIPITOR) 40 mg tablet Take 1 tablet (40 mg total) by mouth daily with dinner, Starting Tue 4/11/2023, Until Fri 4/5/2024, Normal      carvedilol (COREG) 12.5 mg tablet Take 1 tablet (12.5 mg total) by mouth 2 (two) times a day with meals, Starting Thu 5/11/2023, Until Wed 8/9/2023, Normal      [facility] Aspirin Adult Low Dose 81 MG chewable tablet CHEW 1 TABLET BY MOUTH DAILY, Normal      furosemide (LASIX) 20 mg tablet Take 1 tablet (20 mg total) by mouth daily as needed (weight gain more than 3 pounds), Starting Tue 4/11/2023, Normal      lisinopril (ZESTRIL) 40 mg tablet Take 1 tablet (40 mg total) by mouth daily, Starting Tue 4/11/2023, Normal      nitroglycerin (NITROSTAT) 0.4 mg SL tablet Place 1 tablet (0.4 mg total) under the tongue every 5 (five) minutes as needed for chest pain, Starting Mon 7/18/2022, Normal      ticagrelor (BRILINTA) 90 MG Take 1 tablet (90 mg total) by mouth every 12 (twelve) hours, Starting Tue 4/11/2023, Normal             No discharge procedures on file.     PDMP Review     None          ED Provider  Electronically Signed by           Lavell Parker PA-C  07/12/23 2042

## 2023-07-13 ENCOUNTER — VBI (OUTPATIENT)
Dept: INTERNAL MEDICINE CLINIC | Facility: CLINIC | Age: 73
End: 2023-07-13

## 2023-07-13 NOTE — TELEPHONE ENCOUNTER
07/13/23 10:05 AM    Patient contacted post ED visit, first outreach attempt made. Message was left for patient to return a call to the [facility] Department at [facility]: Phone [phone]. Thank you.   PASTOR RIVERA  [facility]

## 2023-07-14 NOTE — TELEPHONE ENCOUNTER
07/14/23 9:32 AM    Patient contacted post ED visit, V[facility] department spoke with patient/caregiver and outreach was successful. Thank you.   PASTOR RIVERA  [facility]

## 2023-07-17 ENCOUNTER — OFFICE VISIT (OUTPATIENT)
Dept: INTERNAL MEDICINE CLINIC | Facility: CLINIC | Age: 73
End: 2023-07-17
Payer: COMMERCIAL

## 2023-07-17 VITALS
OXYGEN SATURATION: 97 % | DIASTOLIC BLOOD PRESSURE: 76 MMHG | HEIGHT: 70 IN | BODY MASS INDEX: 23.19 KG/M2 | SYSTOLIC BLOOD PRESSURE: 122 MMHG | HEART RATE: 62 BPM | TEMPERATURE: 98.7 F | WEIGHT: 162 LBS

## 2023-07-17 DIAGNOSIS — I77.819 AORTIC ECTASIA (HCC): ICD-10-CM

## 2023-07-17 DIAGNOSIS — I21.4 NSTEMI (NON-ST ELEVATED MYOCARDIAL INFARCTION) (HCC): ICD-10-CM

## 2023-07-17 DIAGNOSIS — I25.10 CORONARY ARTERY DISEASE INVOLVING NATIVE CORONARY ARTERY OF NATIVE HEART WITHOUT ANGINA PECTORIS: ICD-10-CM

## 2023-07-17 DIAGNOSIS — E78.2 MIXED HYPERLIPIDEMIA: ICD-10-CM

## 2023-07-17 DIAGNOSIS — R05.8 COUGH DUE TO ACE INHIBITOR: Primary | ICD-10-CM

## 2023-07-17 DIAGNOSIS — I10 PRIMARY HYPERTENSION: ICD-10-CM

## 2023-07-17 DIAGNOSIS — Z72.0 TOBACCO ABUSE: ICD-10-CM

## 2023-07-17 DIAGNOSIS — T46.4X5A COUGH DUE TO ACE INHIBITOR: Primary | ICD-10-CM

## 2023-07-17 DIAGNOSIS — I73.9 PAD (PERIPHERAL ARTERY DISEASE) (HCC): ICD-10-CM

## 2023-07-17 PROCEDURE — 99214 OFFICE O/P EST MOD 30 MIN: CPT | Performed by: INTERNAL MEDICINE

## 2023-07-17 RX ORDER — SPIRONOLACTONE 25 MG/1
25 TABLET ORAL DAILY
Qty: 90 TABLET | Refills: 0 | Status: SHIPPED | OUTPATIENT
Start: 2023-07-17

## 2023-07-17 NOTE — PROGRESS NOTES
Assessment/Plan:    Diagnoses and all orders for this visit:    Cough due to ACE inhibitor  Comments:  Likely due to lisinopril. We will replace with Aldactone 25 mg daily    Mixed hyperlipidemia  Comments:  Not at goal, LDL in June 2022 104. Currently on Lipitor 40 mg daily, recommend to complete blood work for possible uptitration. Orders:  -     spironolactone (ALDACTONE) 25 mg tablet; Take 1 tablet (25 mg total) by mouth daily  -     Cancel: Ambulatory Referral to Cardiology; Future  -     Ambulatory Referral to Cardiology; Future    Tobacco abuse  Comments:  Continues to smoke 2 to 3 cigarettes/day, recommend complete cessation. Patient unwilling to quit entirely. Orders:  -     spironolactone (ALDACTONE) 25 mg tablet; Take 1 tablet (25 mg total) by mouth daily  -     Cancel: Ambulatory Referral to Cardiology; Future  -     Ambulatory Referral to Cardiology; Future    NSTEMI (non-ST elevated myocardial infarction) [facility])  Comments:  Recommend to follow-up with cardiology, our office facilitated with future appointments. Orders:  -     spironolactone (ALDACTONE) 25 mg tablet; Take 1 tablet (25 mg total) by mouth daily  -     Cancel: Ambulatory Referral to Cardiology; Future  -     Ambulatory Referral to Cardiology; Future    Primary hypertension  Comments: We will stop lisinopril due to complaints of cough, will add Aldactone, follow-up with BMP in 1 month along with home BP logs  Orders:  -     spironolactone (ALDACTONE) 25 mg tablet; Take 1 tablet (25 mg total) by mouth daily  -     Cancel: Ambulatory Referral to Cardiology; Future  -     Ambulatory Referral to Cardiology; Future    PAD (peripheral artery disease) (HCC)    Coronary artery disease involving native coronary artery of native heart without angina pectoris  Comments:  Continue Lipitor, carvedilol, Brilinta and aspirin. Patient recommended to follow-up with cardiology. Orders:  -     Ambulatory Referral to Cardiology;  Future    Aortic ectasia ([address])  Comments:  Recommend to follow-up with cardiology for surveillance. No new symptoms today  Orders:  -     Ambulatory Referral to Cardiology; Future                    There are no Patient Instructions on file for this visit. Subjective:      Patient ID: Nalini Stoll is a 75 y.o. male    Patient comes for ER follow-up for upper respiratory symptoms which is now improved with antibiotics, but reports worsening of ongoing dry cough which she reports started after initiation of lisinopril a year back. Patient wants to discontinue lisinopril and start alternate medications. Denies any fever chills shortness of breath or chest pain.         Current Outpatient Medications:   •  amLODIPine (NORVASC) 10 mg tablet, Take 1 tablet (10 mg total) by mouth daily, Disp: 90 tablet, Rfl: 1  •  atorvastatin (LIPITOR) 40 mg tablet, Take 1 tablet (40 mg total) by mouth daily with dinner, Disp: 90 tablet, Rfl: 1  •  benzonatate (TESSALON PERLES) 100 mg capsule, Take 1 capsule (100 mg total) by mouth every 8 (eight) hours as needed for cough, Disp: 21 capsule, Rfl: 0  •  carvedilol (COREG) 12.5 mg tablet, Take 1 tablet (12.5 mg total) by mouth 2 (two) times a day with meals, Disp: 180 tablet, Rfl: 0  •  [facility] Aspirin Adult Low Dose 81 MG chewable tablet, CHEW 1 TABLET BY MOUTH DAILY, Disp: 90 tablet, Rfl: 1  •  furosemide (LASIX) 20 mg tablet, Take 1 tablet (20 mg total) by mouth daily as needed (weight gain more than 3 pounds), Disp: 90 tablet, Rfl: 1  •  nitroglycerin (NITROSTAT) 0.4 mg SL tablet, Place 1 tablet (0.4 mg total) under the tongue every 5 (five) minutes as needed for chest pain, Disp: 24 tablet, Rfl: 1  •  spironolactone (ALDACTONE) 25 mg tablet, Take 1 tablet (25 mg total) by mouth daily, Disp: 90 tablet, Rfl: 0  •  ticagrelor (BRILINTA) 90 MG, Take 1 tablet (90 mg total) by mouth every 12 (twelve) hours, Disp: 180 tablet, Rfl: 1  •  azithromycin (ZITHROMAX) 250 mg tablet, Take 2 tablets today then 1 tablet daily x 4 days (Patient not taking: Reported on 7/17/2023), Disp: 6 tablet, Rfl: 0     Past Medical History:   Diagnosis Date   • Hypertension          Past Surgical History:   Procedure Laterality Date   • ARTERY SURGERY  2018   • CARDIAC CATHETERIZATION N/A 6/28/2022    Procedure: CARDIAC CATHETERIZATION;  Surgeon: Adelina Alvarenga MD;  [facility]; Service: Cardiology   • CARDIAC CATHETERIZATION N/A 6/28/2022    Procedure: Cardiac pci;  Surgeon: Adelina Alvarenga MD;  [facility]; Service: Cardiology         No Known Allergies    No results found for this or any previous visit (from the past 1008 hour(s)). The following portions of the patient's history were reviewed and updated as appropriate: allergies, current medications, past family history, past medical history, past social history, past surgical history and problem list.     Review of Systems   Constitutional: Negative for activity change, appetite change, chills, diaphoresis, fatigue, fever and unexpected weight change. HENT: Negative for congestion and sore throat. Respiratory: Positive for cough. Negative for apnea, choking, chest tightness, shortness of breath, wheezing and stridor. Cardiovascular: Negative for chest pain, palpitations and leg swelling. Gastrointestinal: Negative for abdominal distention, abdominal pain, blood in stool, constipation, nausea and rectal pain. Genitourinary: Negative for dysuria, flank pain, frequency and urgency. Musculoskeletal: Negative for arthralgias, back pain, gait problem, joint swelling and myalgias. Skin: Negative for color change, pallor and rash. Neurological: Negative for headaches. Objective:      Vitals:    07/17/23 1046   BP: 122/76   Pulse: 62   Temp: 98.7 °F (37.1 °C)   SpO2: 97%          Physical Exam  Vitals reviewed. Constitutional:       General: He is not in acute distress. Appearance: Normal appearance.  He is not ill-appearing, toxic-appearing or diaphoretic. HENT:      Mouth/Throat:      Mouth: Mucous membranes are moist.   Cardiovascular:      Rate and Rhythm: Normal rate and regular rhythm. Pulses: Normal pulses. Heart sounds: Normal heart sounds. No murmur heard. No friction rub. No gallop. Pulmonary:      Effort: Pulmonary effort is normal. No respiratory distress. Breath sounds: Normal breath sounds. No stridor. No wheezing, rhonchi or rales. Chest:      Chest wall: No tenderness. Musculoskeletal:      Right lower leg: No edema. Left lower leg: No edema. Skin:     General: Skin is warm and dry. Findings: No lesion or rash. Neurological:      Mental Status: He is alert.

## 2023-08-18 DIAGNOSIS — U07.1 CARDIOMYOPATHY DUE TO COVID-19 VIRUS (HCC): ICD-10-CM

## 2023-08-18 DIAGNOSIS — R77.8 ELEVATED TROPONIN LEVEL NOT DUE MYOCARDIAL INFARCTION: ICD-10-CM

## 2023-08-18 DIAGNOSIS — I43 CARDIOMYOPATHY DUE TO COVID-19 VIRUS (HCC): ICD-10-CM

## 2023-08-22 RX ORDER — CARVEDILOL 12.5 MG/1
12.5 TABLET ORAL 2 TIMES DAILY WITH MEALS
Qty: 180 TABLET | Refills: 0 | Status: SHIPPED | OUTPATIENT
Start: 2023-08-22 | End: 2023-11-20

## 2023-08-25 DIAGNOSIS — I25.10 CAD (CORONARY ARTERY DISEASE): ICD-10-CM

## 2023-08-25 DIAGNOSIS — Z95.5 S/P DRUG ELUTING CORONARY STENT PLACEMENT: ICD-10-CM

## 2023-08-25 DIAGNOSIS — I21.4 NSTEMI (NON-ST ELEVATED MYOCARDIAL INFARCTION) (HCC): ICD-10-CM

## 2023-09-15 PROBLEM — R05.8 COUGH DUE TO ACE INHIBITOR: Status: RESOLVED | Noted: 2023-07-17 | Resolved: 2023-09-15

## 2023-09-15 PROBLEM — T46.4X5A COUGH DUE TO ACE INHIBITOR: Status: RESOLVED | Noted: 2023-07-17 | Resolved: 2023-09-15

## 2023-10-12 ENCOUNTER — TELEPHONE (OUTPATIENT)
Dept: INTERNAL MEDICINE CLINIC | Facility: CLINIC | Age: 73
End: 2023-10-12

## 2023-10-13 DIAGNOSIS — I21.4 NSTEMI (NON-ST ELEVATED MYOCARDIAL INFARCTION) (HCC): ICD-10-CM

## 2023-10-13 DIAGNOSIS — I10 PRIMARY HYPERTENSION: ICD-10-CM

## 2023-10-13 DIAGNOSIS — Z72.0 TOBACCO ABUSE: ICD-10-CM

## 2023-10-13 DIAGNOSIS — E78.2 MIXED HYPERLIPIDEMIA: ICD-10-CM

## 2023-10-13 RX ORDER — SPIRONOLACTONE 25 MG/1
25 TABLET ORAL DAILY
Qty: 90 TABLET | Refills: 1 | Status: SHIPPED | OUTPATIENT
Start: 2023-10-13

## 2023-10-13 NOTE — TELEPHONE ENCOUNTER
Candelario called nad needs a refill on medication sent to [facility] [address]   Last ovs 07/17/2023   No next ovs

## 2023-11-13 ENCOUNTER — TELEPHONE (OUTPATIENT)
Dept: INTERNAL MEDICINE CLINIC | Facility: CLINIC | Age: 73
End: 2023-11-13

## 2023-12-04 DIAGNOSIS — I43 CARDIOMYOPATHY DUE TO COVID-19 VIRUS (HCC): ICD-10-CM

## 2023-12-04 DIAGNOSIS — R79.89 ELEVATED TROPONIN LEVEL NOT DUE MYOCARDIAL INFARCTION: ICD-10-CM

## 2023-12-04 DIAGNOSIS — U07.1 CARDIOMYOPATHY DUE TO COVID-19 VIRUS (HCC): ICD-10-CM

## 2023-12-04 RX ORDER — CARVEDILOL 12.5 MG/1
12.5 TABLET ORAL 2 TIMES DAILY WITH MEALS
Qty: 180 TABLET | Refills: 0 | Status: SHIPPED | OUTPATIENT
Start: 2023-12-04 | End: 2024-03-03

## 2023-12-04 NOTE — TELEPHONE ENCOUNTER
Patient called and needs his prescription for his carvedilol 12.5 mg for 3 months refilled called into [facility] [address] last ov's 10/12/2023  No next visit

## 2023-12-16 DIAGNOSIS — I21.4 NSTEMI (NON-ST ELEVATED MYOCARDIAL INFARCTION) (HCC): ICD-10-CM

## 2023-12-16 DIAGNOSIS — Z95.5 S/P DRUG ELUTING CORONARY STENT PLACEMENT: ICD-10-CM

## 2023-12-16 DIAGNOSIS — I25.10 CAD (CORONARY ARTERY DISEASE): ICD-10-CM

## 2023-12-16 NOTE — TELEPHONE ENCOUNTER
Medication: Lipitor     Dose/Frequency: 40 mg    Quantity: 90    Medication: Brilinta     Dose/Frequency: 90 mg     Quantity: 180    Pharmacy:   [facility]/pharmacy #[id number] - [place], [place] - [address]   [phone]     Office:   [x] PCP/Provider -   [] Speciality/Provider -     Does the patient have enough for 3 days?   [x] Yes   [] No - Send as HP to POD

## 2023-12-18 RX ORDER — ATORVASTATIN CALCIUM 40 MG/1
40 TABLET, FILM COATED ORAL
Qty: 90 TABLET | Refills: 0 | Status: SHIPPED | OUTPATIENT
Start: 2023-12-18 | End: 2024-12-12

## 2023-12-29 ENCOUNTER — TELEPHONE (OUTPATIENT)
Dept: INTERNAL MEDICINE CLINIC | Facility: CLINIC | Age: 73
End: 2023-12-29

## 2023-12-29 NOTE — TELEPHONE ENCOUNTER
Called patient had to leave a voice message for him to call the office back , patient needs to schedule a AWV due now

## 2024-02-26 DIAGNOSIS — I10 PRIMARY HYPERTENSION: ICD-10-CM

## 2024-02-26 RX ORDER — LISINOPRIL 20 MG/1
20 TABLET ORAL DAILY
Qty: 90 TABLET | Refills: 1 | Status: CANCELLED | OUTPATIENT
Start: 2024-02-26

## 2024-02-26 RX ORDER — AMLODIPINE BESYLATE 10 MG/1
10 TABLET ORAL DAILY
Qty: 90 TABLET | Refills: 1 | Status: CANCELLED | OUTPATIENT
Start: 2024-02-26

## 2024-02-26 NOTE — TELEPHONE ENCOUNTER
Refill  for Lisinopril (not ion patient med list.) and Amlodipine   LA:   7-17-23   NA:  Called patient to make an appointment, made appt. For tomorrow.

## 2024-02-27 ENCOUNTER — OFFICE VISIT (OUTPATIENT)
Dept: INTERNAL MEDICINE CLINIC | Facility: CLINIC | Age: 74
End: 2024-02-27
Payer: COMMERCIAL

## 2024-02-27 VITALS
TEMPERATURE: 98.9 F | HEART RATE: 82 BPM | BODY MASS INDEX: 24.34 KG/M2 | OXYGEN SATURATION: 99 % | DIASTOLIC BLOOD PRESSURE: 72 MMHG | SYSTOLIC BLOOD PRESSURE: 130 MMHG | WEIGHT: 170 LBS | HEIGHT: 70 IN

## 2024-02-27 DIAGNOSIS — I21.4 NSTEMI (NON-ST ELEVATED MYOCARDIAL INFARCTION) (HCC): ICD-10-CM

## 2024-02-27 DIAGNOSIS — I73.9 PAD (PERIPHERAL ARTERY DISEASE) (HCC): ICD-10-CM

## 2024-02-27 DIAGNOSIS — I10 PRIMARY HYPERTENSION: ICD-10-CM

## 2024-02-27 DIAGNOSIS — I10 PRIMARY HYPERTENSION: Primary | ICD-10-CM

## 2024-02-27 DIAGNOSIS — J40 BRONCHITIS: ICD-10-CM

## 2024-02-27 DIAGNOSIS — E78.2 MIXED HYPERLIPIDEMIA: ICD-10-CM

## 2024-02-27 DIAGNOSIS — Z72.0 TOBACCO USE: ICD-10-CM

## 2024-02-27 DIAGNOSIS — I50.32 CHRONIC DIASTOLIC CONGESTIVE HEART FAILURE (HCC): ICD-10-CM

## 2024-02-27 DIAGNOSIS — I25.119 ATHEROSCLEROSIS OF NATIVE CORONARY ARTERY OF NATIVE HEART WITH ANGINA PECTORIS (HCC): ICD-10-CM

## 2024-02-27 DIAGNOSIS — J43.2 CENTRILOBULAR EMPHYSEMA (HCC): ICD-10-CM

## 2024-02-27 DIAGNOSIS — Z95.5 S/P DRUG ELUTING CORONARY STENT PLACEMENT: ICD-10-CM

## 2024-02-27 PROCEDURE — 99214 OFFICE O/P EST MOD 30 MIN: CPT | Performed by: INTERNAL MEDICINE

## 2024-02-27 RX ORDER — AMLODIPINE BESYLATE 10 MG/1
10 TABLET ORAL DAILY
Qty: 90 TABLET | Refills: 0 | Status: SHIPPED | OUTPATIENT
Start: 2024-02-27

## 2024-02-27 NOTE — PROGRESS NOTES
Assessment/Plan:    Diagnoses and all orders for this visit:    Primary hypertension  -     CBC (Includes Diff/Plt) (Refl); Future  -     Comprehensive metabolic panel; Future  -     Lipid panel; Future  -     amLODIPine (NORVASC) 10 mg tablet; Take 1 tablet (10 mg total) by mouth daily    Bronchitis  -     CBC (Includes Diff/Plt) (Refl); Future  -     Comprehensive metabolic panel; Future  -     Lipid panel; Future    Elevated troponin level not due myocardial infarction  -     CBC (Includes Diff/Plt) (Refl); Future  -     Comprehensive metabolic panel; Future  -     Lipid panel; Future    Mixed hyperlipidemia  -     CBC (Includes Diff/Plt) (Refl); Future  -     Comprehensive metabolic panel; Future  -     Lipid panel; Future    S/P drug eluting coronary stent placement  -     CBC (Includes Diff/Plt) (Refl); Future  -     Comprehensive metabolic panel; Future  -     Lipid panel; Future    PAD (peripheral artery disease) (HCC)  -     CBC (Includes Diff/Plt) (Refl); Future  -     Comprehensive metabolic panel; Future  -     Lipid panel; Future    NSTEMI (non-ST elevated myocardial infarction) (HCC)  -     CBC (Includes Diff/Plt) (Refl); Future  -     Comprehensive metabolic panel; Future  -     Lipid panel; Future    Centrilobular emphysema (HCC)    Chronic diastolic congestive heart failure (HCC)    Atherosclerosis of native coronary artery of native heart with angina pectoris (HCC)    Primary hypertension  Comments:  Stable continue current medications  Orders:  -     CBC (Includes Diff/Plt) (Refl); Future  -     Comprehensive metabolic panel; Future  -     Lipid panel; Future  -     amLODIPine (NORVASC) 10 mg tablet; Take 1 tablet (10 mg total) by mouth daily       Patient with a history of hypertension/CAD/tobacco use  Reports being stable on current medication, denies any subjective complaints today, was able to quit smoking in the last 3 months with the help of nicotine gum.    No lab work since 7/2022.  Labs  have been ordered, patient is requested to complete labs in a timely fashion, follow-up with me every 4 months.       There are no Patient Instructions on file for this visit.    Subjective:      Patient ID: Josh Olsen is a 73 y.o. male    HPI      Current Outpatient Medications:     amLODIPine (NORVASC) 10 mg tablet, Take 1 tablet (10 mg total) by mouth daily, Disp: 90 tablet, Rfl: 0    atorvastatin (LIPITOR) 40 mg tablet, Take 1 tablet (40 mg total) by mouth daily with dinner, Disp: 90 tablet, Rfl: 0    carvedilol (COREG) 12.5 mg tablet, Take 1 tablet (12.5 mg total) by mouth 2 (two) times a day with meals, Disp: 180 tablet, Rfl: 0    furosemide (LASIX) 20 mg tablet, Take 1 tablet (20 mg total) by mouth daily as needed (weight gain more than 3 pounds), Disp: 90 tablet, Rfl: 1    nitroglycerin (NITROSTAT) 0.4 mg SL tablet, Place 1 tablet (0.4 mg total) under the tongue every 5 (five) minutes as needed for chest pain, Disp: 24 tablet, Rfl: 1    spironolactone (ALDACTONE) 25 mg tablet, Take 1 tablet (25 mg total) by mouth daily, Disp: 90 tablet, Rfl: 1    ticagrelor (BRILINTA) 90 MG, Take 1 tablet (90 mg total) by mouth every 12 (twelve) hours, Disp: 180 tablet, Rfl: 0    [facility] Aspirin Adult Low Dose 81 MG chewable tablet, CHEW 1 TABLET BY MOUTH DAILY (Patient not taking: Reported on 2/27/2024), Disp: 90 tablet, Rfl: 1     Past Medical History:   Diagnosis Date    Hypertension          Past Surgical History:   Procedure Laterality Date    ARTERY SURGERY  2018    CARDIAC CATHETERIZATION N/A 6/28/2022    Procedure: CARDIAC CATHETERIZATION;  Surgeon: Billy Ansari MD;  [facility];  Service: Cardiology    CARDIAC CATHETERIZATION N/A 6/28/2022    Procedure: Cardiac pci;  Surgeon: Billy Ansari MD;  [facility];  Service: Cardiology         No Known Allergies    No results found for this or any previous visit (from the past 1008 hour(s)).    The following portions of the patient's history  were reviewed and updated as appropriate: allergies, current medications, past family history, past medical history, past social history, past surgical history and problem list.     Review of Systems   Constitutional:  Negative for activity change, appetite change, chills, diaphoresis, fatigue, fever and unexpected weight change.   HENT:  Negative for congestion and sore throat.    Respiratory:  Negative for apnea, cough, choking, chest tightness, shortness of breath, wheezing and stridor.    Cardiovascular:  Negative for chest pain, palpitations and leg swelling.   Gastrointestinal:  Negative for abdominal distention, abdominal pain, blood in stool, constipation, nausea and rectal pain.   Genitourinary:  Negative for dysuria, flank pain, frequency and urgency.   Musculoskeletal:  Negative for arthralgias, back pain, gait problem, joint swelling and myalgias.   Skin:  Negative for color change, pallor and rash.   Neurological:  Negative for headaches.         Objective:      Vitals:    02/27/24 1404   BP: 130/72   Pulse: 82   Temp: 98.9 °F (37.2 °C)   SpO2: 99%          Physical Exam  Vitals reviewed.   Constitutional:       General: He is not in acute distress.     Appearance: Normal appearance. He is not ill-appearing, toxic-appearing or diaphoretic.   HENT:      Mouth/Throat:      Mouth: Mucous membranes are moist.   Cardiovascular:      Rate and Rhythm: Normal rate and regular rhythm.      Pulses: Normal pulses.      Heart sounds: Normal heart sounds. No murmur heard.     No friction rub. No gallop.   Pulmonary:      Effort: Pulmonary effort is normal. No respiratory distress.      Breath sounds: Normal breath sounds. No stridor. No wheezing, rhonchi or rales.   Chest:      Chest wall: No tenderness.   Musculoskeletal:      Right lower leg: No edema.      Left lower leg: No edema.   Skin:     General: Skin is warm and dry.      Findings: No lesion or rash.   Neurological:      Mental Status: He is alert.

## 2024-04-15 DIAGNOSIS — R79.89 ELEVATED TROPONIN LEVEL NOT DUE MYOCARDIAL INFARCTION: ICD-10-CM

## 2024-04-15 DIAGNOSIS — U07.1 CARDIOMYOPATHY DUE TO COVID-19 VIRUS (HCC): ICD-10-CM

## 2024-04-15 DIAGNOSIS — I43 CARDIOMYOPATHY DUE TO COVID-19 VIRUS (HCC): ICD-10-CM

## 2024-04-15 DIAGNOSIS — I10 PRIMARY HYPERTENSION: ICD-10-CM

## 2024-04-15 DIAGNOSIS — I25.10 CAD (CORONARY ARTERY DISEASE): ICD-10-CM

## 2024-04-15 DIAGNOSIS — Z95.5 S/P DRUG ELUTING CORONARY STENT PLACEMENT: ICD-10-CM

## 2024-04-15 DIAGNOSIS — E78.2 MIXED HYPERLIPIDEMIA: ICD-10-CM

## 2024-04-15 DIAGNOSIS — Z72.0 TOBACCO ABUSE: ICD-10-CM

## 2024-04-15 DIAGNOSIS — I21.4 NSTEMI (NON-ST ELEVATED MYOCARDIAL INFARCTION) (HCC): ICD-10-CM

## 2024-04-15 RX ORDER — AMLODIPINE BESYLATE 10 MG/1
10 TABLET ORAL DAILY
Qty: 90 TABLET | Refills: 0 | Status: SHIPPED | OUTPATIENT
Start: 2024-04-15

## 2024-04-15 RX ORDER — SPIRONOLACTONE 25 MG/1
25 TABLET ORAL DAILY
Qty: 90 TABLET | Refills: 1 | Status: SHIPPED | OUTPATIENT
Start: 2024-04-15

## 2024-04-15 RX ORDER — ATORVASTATIN CALCIUM 40 MG/1
40 TABLET, FILM COATED ORAL
Qty: 90 TABLET | Refills: 0 | Status: SHIPPED | OUTPATIENT
Start: 2024-04-15 | End: 2025-04-10

## 2024-04-15 RX ORDER — CARVEDILOL 12.5 MG/1
12.5 TABLET ORAL 2 TIMES DAILY WITH MEALS
Qty: 180 TABLET | Refills: 0 | Status: SHIPPED | OUTPATIENT
Start: 2024-04-15 | End: 2024-07-14

## 2024-04-15 NOTE — TELEPHONE ENCOUNTER
Patient needs refills on medications sent to [facility] [address] [place]     Last ov's 02/27/2024  Next ov's 06/25/2024

## 2024-04-29 DIAGNOSIS — I21.4 NSTEMI (NON-ST ELEVATED MYOCARDIAL INFARCTION) (HCC): ICD-10-CM

## 2024-04-29 DIAGNOSIS — Z95.5 S/P DRUG ELUTING CORONARY STENT PLACEMENT: ICD-10-CM

## 2024-04-29 DIAGNOSIS — I25.10 CAD (CORONARY ARTERY DISEASE): ICD-10-CM

## 2024-04-29 NOTE — TELEPHONE ENCOUNTER
Reason for call:   [x] Refill   [] Prior Auth  [] Other:     Office:   [x] PCP/Provider - [facility]  [] Specialty/Provider -     Medication:        Does the patient have enough for 3 days?   [] Yes   [x] No - Send as HP to POD

## 2024-10-01 DIAGNOSIS — R79.89 ELEVATED TROPONIN LEVEL NOT DUE MYOCARDIAL INFARCTION: ICD-10-CM

## 2024-10-01 DIAGNOSIS — I21.4 NSTEMI (NON-ST ELEVATED MYOCARDIAL INFARCTION) (HCC): ICD-10-CM

## 2024-10-01 DIAGNOSIS — I43 CARDIOMYOPATHY DUE TO COVID-19 VIRUS (HCC): ICD-10-CM

## 2024-10-01 DIAGNOSIS — U07.1 CARDIOMYOPATHY DUE TO COVID-19 VIRUS (HCC): ICD-10-CM

## 2024-10-01 DIAGNOSIS — Z95.5 S/P DRUG ELUTING CORONARY STENT PLACEMENT: ICD-10-CM

## 2024-10-01 DIAGNOSIS — I25.10 CAD (CORONARY ARTERY DISEASE): ICD-10-CM

## 2024-10-01 DIAGNOSIS — I10 PRIMARY HYPERTENSION: ICD-10-CM

## 2024-10-01 RX ORDER — CARVEDILOL 12.5 MG/1
12.5 TABLET ORAL 2 TIMES DAILY WITH MEALS
Qty: 60 TABLET | Refills: 0 | Status: SHIPPED | OUTPATIENT
Start: 2024-10-01 | End: 2024-12-30

## 2024-10-01 RX ORDER — AMLODIPINE BESYLATE 10 MG/1
10 TABLET ORAL DAILY
Qty: 30 TABLET | Refills: 0 | Status: SHIPPED | OUTPATIENT
Start: 2024-10-01

## 2024-10-01 NOTE — TELEPHONE ENCOUNTER
Reason for call:   [x] Refill   [] Prior Auth  [] Other:     Office:   [x] PCP/Provider - Mitchell Matt MD   [] Specialty/Provider -     Medication   carvedilol (COREG) 12.5 mg tablet () Take 1 tablet (12.5 mg total) by mouth 2 (two) times a day with meals   180      amLODIPine (NORVASC) 10 mg tablet Take 1 tablet (10 mg total) by mouth daily   90      ticagrelor (BRILINTA) 90 MG Take 1 tablet (90 mg total) by mouth every 12 (twelve) hours   180          Pharmacy: [facility]/pharmacy #[id number] - [place], [place] - [address] [phone]     Does the patient have enough for 3 days?   [] Yes   [x] No - Send as HP to POD

## 2024-10-14 NOTE — Clinical Note
Name from pharmacy: Cetirizine HCl 10 MG Oral Tablet         Will file in chart as: cetirizine (ZyrTEC) 10 MG tablet    Sig: Take 1 tablet by mouth once daily    Disp: 90 tablet    Refills: 0    Start: 10/14/2024    Class: Eprescribe    Non-formulary For: Allergic rhinitis due to animals    Last ordered: 5 months ago (4/26/2024) by MATTHIEU Dominique    Last refill: 7/21/2024    Rx #: [id number]    Non-sedating Antihistamines Refill Protocol - 12 Month Protocol Pafenk31/14/2024 06:05 AM   Protocol Details Seen by prescribing provider or same department within the last 12 months or has a future appt in 3 months - IF FAILED PLEASE LOOK AT CHART REVIEW FOR LAST VISIT AND PROCEED ACCORDINGLY    Medication (including dose and sig) on current meds list      To be filled at: [facility] [address] [place] [address]        LOV: 4-24-24  NOV: 2-3-25    Plan:   5. Allergic rhinitis due to animals  Plan: Continue with antihistamine and Flonase.   Intravascular ultrasound catheter inserted for high resolution imaging

## 2024-11-03 ENCOUNTER — APPOINTMENT (EMERGENCY)
Dept: RADIOLOGY | Facility: HOSPITAL | Age: 74
End: 2024-11-03
Payer: COMMERCIAL

## 2024-11-03 ENCOUNTER — HOSPITAL ENCOUNTER (EMERGENCY)
Facility: HOSPITAL | Age: 74
Discharge: HOME | End: 2024-11-03
Attending: EMERGENCY MEDICINE
Payer: COMMERCIAL

## 2024-11-03 VITALS
SYSTOLIC BLOOD PRESSURE: 168 MMHG | RESPIRATION RATE: 18 BRPM | DIASTOLIC BLOOD PRESSURE: 84 MMHG | HEART RATE: 77 BPM | TEMPERATURE: 98 F | OXYGEN SATURATION: 97 %

## 2024-11-03 DIAGNOSIS — R71.8 MICROCYTOSIS: ICD-10-CM

## 2024-11-03 DIAGNOSIS — I10 HYPERTENSION: Primary | ICD-10-CM

## 2024-11-03 DIAGNOSIS — E83.42 HYPOMAGNESEMIA: ICD-10-CM

## 2024-11-03 DIAGNOSIS — I49.3 PVC'S (PREMATURE VENTRICULAR CONTRACTIONS): ICD-10-CM

## 2024-11-03 LAB
ALBUMIN SERPL BCG-MCNC: 4 G/DL (ref 3.5–5)
ALP SERPL-CCNC: 85 U/L (ref 34–104)
ALT SERPL W P-5'-P-CCNC: 12 U/L (ref 7–52)
ANION GAP SERPL CALCULATED.3IONS-SCNC: 6 MMOL/L (ref 4–13)
AST SERPL W P-5'-P-CCNC: 15 U/L (ref 13–39)
BASOPHILS # BLD AUTO: 0.07 THOUSANDS/ΜL (ref 0–0.1)
BASOPHILS NFR BLD AUTO: 1 % (ref 0–1)
BILIRUB SERPL-MCNC: 0.35 MG/DL (ref 0.2–1)
BUN SERPL-MCNC: 19 MG/DL (ref 5–25)
CALCIUM SERPL-MCNC: 9 MG/DL (ref 8.4–10.2)
CHLORIDE SERPL-SCNC: 107 MMOL/L (ref 96–108)
CO2 SERPL-SCNC: 25 MMOL/L (ref 21–32)
CREAT SERPL-MCNC: 1.18 MG/DL (ref 0.6–1.3)
EOSINOPHIL # BLD AUTO: 0.45 THOUSAND/ΜL (ref 0–0.61)
EOSINOPHIL NFR BLD AUTO: 5 % (ref 0–6)
ERYTHROCYTE [DISTWIDTH] IN BLOOD BY AUTOMATED COUNT: 24.2 % (ref 11.6–15.1)
GFR SERPL CREATININE-BSD FRML MDRD: 60 ML/MIN/1.73SQ M
GLUCOSE SERPL-MCNC: 105 MG/DL (ref 65–140)
HCT VFR BLD AUTO: 40.1 % (ref 36.5–49.3)
HGB BLD-MCNC: 12.3 G/DL (ref 12–17)
IMM GRANULOCYTES # BLD AUTO: 0.03 THOUSAND/UL (ref 0–0.2)
IMM GRANULOCYTES NFR BLD AUTO: 0 % (ref 0–2)
LYMPHOCYTES # BLD AUTO: 2.69 THOUSANDS/ΜL (ref 0.6–4.47)
LYMPHOCYTES NFR BLD AUTO: 28 % (ref 14–44)
MAGNESIUM SERPL-MCNC: 1.8 MG/DL (ref 1.9–2.7)
MCH RBC QN AUTO: 22.9 PG (ref 26.8–34.3)
MCHC RBC AUTO-ENTMCNC: 30.7 G/DL (ref 31.4–37.4)
MCV RBC AUTO: 75 FL (ref 82–98)
MONOCYTES # BLD AUTO: 1.21 THOUSAND/ΜL (ref 0.17–1.22)
MONOCYTES NFR BLD AUTO: 13 % (ref 4–12)
NEUTROPHILS # BLD AUTO: 5.08 THOUSANDS/ΜL (ref 1.85–7.62)
NEUTS SEG NFR BLD AUTO: 53 % (ref 43–75)
NRBC BLD AUTO-RTO: 0 /100 WBCS
PLATELET # BLD AUTO: 216 THOUSANDS/UL (ref 149–390)
PMV BLD AUTO: 10.5 FL (ref 8.9–12.7)
POTASSIUM SERPL-SCNC: 4 MMOL/L (ref 3.5–5.3)
PROT SERPL-MCNC: 6.8 G/DL (ref 6.4–8.4)
RBC # BLD AUTO: 5.36 MILLION/UL (ref 3.88–5.62)
SODIUM SERPL-SCNC: 138 MMOL/L (ref 135–147)
WBC # BLD AUTO: 9.53 THOUSAND/UL (ref 4.31–10.16)

## 2024-11-03 PROCEDURE — 83735 ASSAY OF MAGNESIUM: CPT

## 2024-11-03 PROCEDURE — 99285 EMERGENCY DEPT VISIT HI MDM: CPT

## 2024-11-03 PROCEDURE — 83540 ASSAY OF IRON: CPT | Performed by: EMERGENCY MEDICINE

## 2024-11-03 PROCEDURE — 71045 X-RAY EXAM CHEST 1 VIEW: CPT

## 2024-11-03 PROCEDURE — 80053 COMPREHEN METABOLIC PANEL: CPT

## 2024-11-03 PROCEDURE — 85025 COMPLETE CBC W/AUTO DIFF WBC: CPT

## 2024-11-03 PROCEDURE — 82728 ASSAY OF FERRITIN: CPT | Performed by: EMERGENCY MEDICINE

## 2024-11-03 PROCEDURE — 36415 COLL VENOUS BLD VENIPUNCTURE: CPT

## 2024-11-03 PROCEDURE — 93005 ELECTROCARDIOGRAM TRACING: CPT

## 2024-11-03 PROCEDURE — 99285 EMERGENCY DEPT VISIT HI MDM: CPT | Performed by: EMERGENCY MEDICINE

## 2024-11-03 PROCEDURE — 83550 IRON BINDING TEST: CPT | Performed by: EMERGENCY MEDICINE

## 2024-11-04 LAB
ATRIAL RATE: 72 BPM
FERRITIN SERPL-MCNC: 7 NG/ML (ref 24–336)
IRON SATN MFR SERPL: 12 % (ref 15–50)
IRON SERPL-MCNC: 47 UG/DL (ref 50–212)
P AXIS: 76 DEGREES
PR INTERVAL: 198 MS
QRS AXIS: 72 DEGREES
QRSD INTERVAL: 76 MS
QT INTERVAL: 408 MS
QTC INTERVAL: 446 MS
T WAVE AXIS: 55 DEGREES
TIBC SERPL-MCNC: 380 UG/DL (ref 250–450)
UIBC SERPL-MCNC: 333 UG/DL (ref 155–355)
VENTRICULAR RATE: 72 BPM

## 2024-11-04 PROCEDURE — 93010 ELECTROCARDIOGRAM REPORT: CPT | Performed by: INTERNAL MEDICINE

## 2024-11-04 NOTE — ED PROVIDER NOTES
Time reflects when diagnosis was documented in both MDM as applicable and the Disposition within this note       Time User Action Codes Description Comment    11/3/2024 10:56 PM Reji Wes Steven [I10] Hypertension     11/3/2024 10:57 PM Wes Mendoza [R71.8] Microcytosis     11/3/2024 11:08 PM Reji Wes Steven [E83.42] Hypomagnesemia     11/3/2024 11:08 PM Wes Mendoza [I49.3] PVC's (premature ventricular contractions)           ED Disposition       ED Disposition   Discharge    Condition   Stable    Date/Time   Sun Nov 3, 2024 11:11 PM    Comment   Josh Olsen discharge to home/self care.                   Assessment & Plan       Medical Decision Making  74y.o M presenting for elevated blood pressure. Pt presents with asymptomatic HTN. Labs were obtained to evaluate renal function, which was wnl. NO neuro deficits on exam. BP readings were obtained on both arms to identify possible aortic dissection. BP readings within range of each other. Furthermore, CXR without evidence of aortic dissection. EKG showed PVCs, but otherwise were not evident of acute ischemic changes. These findings, in addition to the absence of CP, were reassuring, therefore, troponin levels were deferred at this time. Given he is asymptomatic, there is no indication to treat his BP as the risks outweigh the benefits. Pt was advised to f/u with his PCP regarding his anti-hypertensives. Strict return precautions discussed.    Amount and/or Complexity of Data Reviewed  Labs: ordered.  Radiology: ordered and independent interpretation performed.             Medications - No data to display    ED Risk Strat Scores                                               History of Present Illness       Chief Complaint   Patient presents with    Hypertension     Pt states he recently refilled his prescription of Amlodipine and was given a different brand. States had BP readings in the 170 earlier this evening.       Past Medical History:   Diagnosis  Date    Hypertension       Past Surgical History:   Procedure Laterality Date    ARTERY SURGERY  2018    CARDIAC CATHETERIZATION N/A 6/28/2022    Procedure: CARDIAC CATHETERIZATION;  Surgeon: Billy Ansari MD;  [facility];  Service: Cardiology    CARDIAC CATHETERIZATION N/A 6/28/2022    Procedure: Cardiac pci;  Surgeon: Billy Ansari MD;  [facility];  Service: Cardiology      Family History   Problem Relation Age of Onset    Stomach cancer Mother     No Known Problems Father       Social History     Tobacco Use    Smoking status: Heavy Smoker     Current packs/day: 0.25     Average packs/day: 0.3 packs/day for 49.6 years (12.4 ttl pk-yrs)     Types: Cigarettes     Start date: 3/11/1975    Smokeless tobacco: Never   Vaping Use    Vaping status: Never Used   Substance Use Topics    Alcohol use: Yes     Comment: ocassionally    Drug use: Never      E-Cigarette/Vaping    E-Cigarette Use Never User       E-Cigarette/Vaping Substances    Nicotine No     THC No     CBD No     Flavoring No     Other No     Unknown No       I have reviewed and agree with the history as documented.     74y.o M w/ h/o HTN, CAD, PAD presenting for elevated blood pressure. Pt recently picked up his amlodipine from the pharmacy. He states that it's a different , but otherwise is the same dose he's been on. Usually his BP is around 140' systolic after he takes his medication. This morning he took his usual morning medications. Throughout the day he felt dizzy and had a gritty sensation in his eyes. He states that these sx usually indicate elevated BP. When he took his BP it was in the 170's. He took another dose of his medication. A few hours later he rechecked it and it still read in the 170's. Although he's asx now, he wanted to come in to get evaluated. He states he has an appointment with his doctor tomorrow morning. Pt denies vision loss, HA, numbness, weakness, syncope, CP, SOB, edema, N/V,  abdominal pain.      History provided by:  Patient      Review of Systems   Constitutional:  Negative for activity change, appetite change, chills and fever.   Eyes:  Positive for pain. Negative for visual disturbance.   Respiratory:  Negative for shortness of breath.    Cardiovascular:  Negative for chest pain and leg swelling.   Gastrointestinal:  Negative for abdominal pain, nausea and vomiting.   Neurological:  Positive for dizziness. Negative for syncope, facial asymmetry, weakness, numbness and headaches.           Objective       ED Triage Vitals [11/03/24 2213]   Temperature Pulse Blood Pressure Respirations SpO2 Patient Position - Orthostatic VS   98 °F (36.7 °C) 72 (!) 193/91 18 97 % Lying      Temp Source Heart Rate Source BP Location FiO2 (%) Pain Score    Oral Monitor Right arm -- --      Vitals      Date and Time Temp Pulse SpO2 Resp BP Pain Score FACES Pain Rating User   11/03/24 2306 -- 71 97 % 18 176/86 -- --    11/03/24 2230 -- 75 98 % -- 181/93 -- --    11/03/24 2229 -- 73 -- -- 168/84 -- --    11/03/24 2213 98 °F (36.7 °C) 72 97 % 18 193/91 -- -- AW            Physical Exam  Constitutional:       General: He is not in acute distress.     Appearance: Normal appearance.   HENT:      Mouth/Throat:      Mouth: Mucous membranes are moist.      Pharynx: Oropharynx is clear.   Eyes:      Extraocular Movements: Extraocular movements intact.      Conjunctiva/sclera: Conjunctivae normal.      Pupils: Pupils are equal, round, and reactive to light.   Cardiovascular:      Rate and Rhythm: Normal rate and regular rhythm.      Pulses: Normal pulses.      Heart sounds: Normal heart sounds.   Pulmonary:      Effort: Pulmonary effort is normal.      Breath sounds: Normal breath sounds.   Abdominal:      General: Abdomen is flat.      Palpations: Abdomen is soft.   Musculoskeletal:      Right lower leg: No edema.      Left lower leg: No edema.   Skin:     General: Skin is warm and dry.      Capillary Refill:  Capillary refill takes less than 2 seconds.   Neurological:      General: No focal deficit present.      Mental Status: He is alert and oriented to person, place, and time.         Results Reviewed       Procedure Component Value Units Date/Time    Comprehensive metabolic panel [[id number]] Collected: 11/03/24 2242    Lab Status: Final result Specimen: Blood from Arm, Right Updated: 11/03/24 2307     Sodium 138 mmol/L      Potassium 4.0 mmol/L      Chloride 107 mmol/L      CO2 25 mmol/L      ANION GAP 6 mmol/L      BUN 19 mg/dL      Creatinine 1.18 mg/dL      Glucose 105 mg/dL      Calcium 9.0 mg/dL      AST 15 U/L      ALT 12 U/L      Alkaline Phosphatase 85 U/L      Total Protein 6.8 g/dL      Albumin 4.0 g/dL      Total Bilirubin 0.35 mg/dL      eGFR 60 ml/min/1.73sq m     Narrative:      National Kidney Disease Foundation guidelines for Chronic Kidney Disease (CKD):     Stage 1 with normal or high GFR (GFR > 90 mL/min/1.73 square meters)    Stage 2 Mild CKD (GFR = 60-89 mL/min/1.73 square meters)    Stage 3A Moderate CKD (GFR = 45-59 mL/min/1.73 square meters)    Stage 3B Moderate CKD (GFR = 30-44 mL/min/1.73 square meters)    Stage 4 Severe CKD (GFR = 15-29 mL/min/1.73 square meters)    Stage 5 End Stage CKD (GFR <15 mL/min/1.73 square meters)  Note: GFR calculation is accurate only with a steady state creatinine    Magnesium [[id number]]  (Abnormal) Collected: 11/03/24 2242    Lab Status: Final result Specimen: Blood from Arm, Right Updated: 11/03/24 2307     Magnesium 1.8 mg/dL     CBC and differential [[id number]]  (Abnormal) Collected: 11/03/24 2242    Lab Status: Final result Specimen: Blood from Arm, Right Updated: 11/03/24 2253     WBC 9.53 Thousand/uL      RBC 5.36 Million/uL      Hemoglobin 12.3 g/dL      Hematocrit 40.1 %      MCV 75 fL      MCH 22.9 pg      MCHC 30.7 g/dL      RDW 24.2 %      MPV 10.5 fL      Platelets 216 Thousands/uL      nRBC 0 /100 WBCs      Segmented % 53 %      Immature Grans % 0  %      Lymphocytes % 28 %      Monocytes % 13 %      Eosinophils Relative 5 %      Basophils Relative 1 %      Absolute Neutrophils 5.08 Thousands/µL      Absolute Immature Grans 0.03 Thousand/uL      Absolute Lymphocytes 2.69 Thousands/µL      Absolute Monocytes 1.21 Thousand/µL      Eosinophils Absolute 0.45 Thousand/µL      Basophils Absolute 0.07 Thousands/µL             XR chest 1 view portable   ED Interpretation by Wes Mendoza MD (11/03 2306)   No acute cardiopulmonary pathology          ECG 12 Lead Documentation Only    Date/Time: 11/3/2024 10:50 PM    Performed by: Wes Mendoza MD  Authorized by: Wes Mendoza MD    ECG reviewed by me, the ED Provider: yes    Patient location:  ED  Interpretation:     Interpretation: abnormal    Rate:     ECG rate:  72    ECG rate assessment: normal    Rhythm:     Rhythm: sinus rhythm    Ectopy:     Ectopy: PVCs      PVCs:  Infrequent  QRS:     QRS axis:  Normal    QRS intervals:  Normal  Conduction:     Conduction: normal    ST segments:     ST segments:  Normal  T waves:     T waves: normal        ED Medication and Procedure Management   Prior to Admission Medications   Prescriptions Last Dose Informant Patient Reported? Taking?   [facility]S Aspirin Adult Low Dose 81 MG chewable tablet  Self No No   Sig: CHEW 1 TABLET BY MOUTH DAILY   Patient not taking: Reported on 2/27/2024   amLODIPine (NORVASC) 10 mg tablet   No No   Sig: Take 1 tablet (10 mg total) by mouth daily   atorvastatin (LIPITOR) 40 mg tablet   No No   Sig: Take 1 tablet (40 mg total) by mouth daily with dinner   carvedilol (COREG) 12.5 mg tablet   No No   Sig: Take 1 tablet (12.5 mg total) by mouth 2 (two) times a day with meals   furosemide (LASIX) 20 mg tablet  Self No No   Sig: Take 1 tablet (20 mg total) by mouth daily as needed (weight gain more than 3 pounds)   nitroglycerin (NITROSTAT) 0.4 mg SL tablet  Self No No   Sig: Place 1 tablet (0.4 mg total) under the tongue every 5 (five) minutes as needed  for chest pain   spironolactone (ALDACTONE) 25 mg tablet   No No   Sig: Take 1 tablet (25 mg total) by mouth daily   ticagrelor (BRILINTA) 90 MG   No No   Sig: Take 1 tablet (90 mg total) by mouth every 12 (twelve) hours      Facility-Administered Medications: None     Patient's Medications   Discharge Prescriptions    No medications on file     No discharge procedures on file.  ED SEPSIS DOCUMENTATION   Time reflects when diagnosis was documented in both MDM as applicable and the Disposition within this note       Time User Action Codes Description Comment    11/3/2024 10:56 PM Wes Mendoza [I10] Hypertension     11/3/2024 10:57 PM eWs Mendoza [R71.8] Microcytosis     11/3/2024 11:08 PM Wes Mendoza [E83.42] Hypomagnesemia     11/3/2024 11:08 PM Wes Mendoza [I49.3] PVC's (premature ventricular contractions)                  Wes Mendoza MD  11/03/24 9856

## 2024-11-04 NOTE — ED ATTENDING ATTESTATION
11/3/2024  I, Brett Badillo MD, saw and evaluated the patient. I have discussed the patient with the resident/non-physician practitioner and agree with the resident's/non-physician practitioner's findings, Plan of Care, and MDM as documented in the resident's/non-physician practitioner's note, except where noted. All available labs and Radiology studies were reviewed.  I was present for key portions of any procedure(s) performed by the resident/non-physician practitioner and I was immediately available to provide assistance.       At this point I agree with the current assessment done in the Emergency Department.  I have conducted an independent evaluation of this patient a history and physical is as follows: Patient is a 74 year old male with high blood pressure today and got a new brand of his amlodipine today from the pharmacy. No chest pain. No headache. No blurry vision. No urinary sx. No sob. Was last seen at [facility] on 2/27/24 for primary HTN. [facility] website checked on this patient and no Rx found. NCAT. No scleral icterus. Lungs clear. Heart regular without murmur. Abdomen soft and nontender. Good bowel sounds. No edema. No rash noted. No focal deficits. Differential diagnosis including but not limited to: hypertension, hypertensive urgency, hypertensive crisis, malignant hypertension, end organ damage, renal failure, metabolic abnormality; doubt intracranial process, ACS, MI; doubt pheochromocytoma. BP decreasing while in ED. Will check EKG, labs and CXR and monitor BP.     ED Course         Critical Care Time  Procedures

## 2024-11-04 NOTE — DISCHARGE INSTRUCTIONS
Follow up with your PCP regarding your persistently elevated blood pressure.     Return to the ER if you develop:    Numbness, weakness, slurred speech, abdominal pain, chest pain, shortness of breath, vision changes.

## 2024-11-05 ENCOUNTER — VBI (OUTPATIENT)
Dept: INTERNAL MEDICINE CLINIC | Facility: CLINIC | Age: 74
End: 2024-11-05

## 2024-11-05 NOTE — TELEPHONE ENCOUNTER
11/05/24 3:47 PM    Patient contacted post ED visit, first outreach attempt made. Message was left for patient to return a call to the VBI Department at [facility]: Phone [phone].    Thank you.  Deb Pedraza MA  [facility]

## 2024-11-07 NOTE — TELEPHONE ENCOUNTER
11/07/24 2:29 PM    Patient contacted post ED visit, VBI department spoke with patient/caregiver and outreach was successful.    Thank you.  Deb Pedraza MA  [facility]

## 2024-11-10 DIAGNOSIS — I10 PRIMARY HYPERTENSION: ICD-10-CM

## 2024-11-13 RX ORDER — AMLODIPINE BESYLATE 10 MG/1
10 TABLET ORAL DAILY
Qty: 90 TABLET | Refills: 0 | Status: SHIPPED | OUTPATIENT
Start: 2024-11-13

## 2024-11-26 DIAGNOSIS — R79.89 ELEVATED TROPONIN LEVEL NOT DUE MYOCARDIAL INFARCTION: ICD-10-CM

## 2024-11-26 DIAGNOSIS — U07.1 CARDIOMYOPATHY DUE TO COVID-19 VIRUS (HCC): ICD-10-CM

## 2024-11-26 DIAGNOSIS — I43 CARDIOMYOPATHY DUE TO COVID-19 VIRUS (HCC): ICD-10-CM

## 2024-11-26 NOTE — TELEPHONE ENCOUNTER
Reason for call:   [x] Refill   [] Prior Auth  [x] Other: Patient requesting 90 day supply    Office:   [x] PCP/Provider - [facility] - Mitchell Matt MD   [] Specialty/Provider -     Medication: carvedilol (COREG) 12.5 mg tablet     Dose/Frequency:  Take 1 tablet (12.5 mg total) by mouth 2 (two) times a day with meals     Quantity: 60 tablet    Pharmacy: [facility]/pharmacy #[id number] - [place], [place] - [address] [phone]    Does the patient have enough for 3 days?   [x] Yes   [] No - Send as HP to POD

## 2024-11-29 RX ORDER — CARVEDILOL 12.5 MG/1
12.5 TABLET ORAL 2 TIMES DAILY WITH MEALS
Qty: 60 TABLET | Refills: 0 | Status: SHIPPED | OUTPATIENT
Start: 2024-11-29 | End: 2025-02-27

## 2024-12-09 NOTE — PROGRESS NOTES
EXERCISE SESSION DETAIL [FreeTextEntry1] : The patient is here for new right elbow pain on the side of her right shoulder operation. The patient reports no new trauma and pain for over a week. She has exquisite pain to the olecranon with minimal but present effusion. No drainage or erythema noted. The patient denies new trauma. She denies paresthesias. She has pain with leaning on the elbow and with overuse or repetitive movement. Her pain is moderate in nature and intermittent. She has not had treatment outside home remedies.   Right elbow exam: The patient presents with no apparent distress. Neurovascularly intact. Sensation intact to right upper extremity. No scars, rashes, or abrasions. 2+ pulses to the distal radius.Tender to palpation at olecranon process and slightly to the lateral epicondyle. Mild effusion noted to the olecranon with possible loose body to palpation.   Right shoulder xrays taken today in office, 5 views WB: No fractures or loose bodies. Mild OA noted. No obvious tumors, masses, or lesions  The patient will go for MRI to r/o loose body. She will ice, rest, and avoid leaning on her elbow. She will consider steroids as we get farther from her surgery if not better.

## 2024-12-22 DIAGNOSIS — U07.1 CARDIOMYOPATHY DUE TO COVID-19 VIRUS (HCC): ICD-10-CM

## 2024-12-22 DIAGNOSIS — I43 CARDIOMYOPATHY DUE TO COVID-19 VIRUS (HCC): ICD-10-CM

## 2024-12-22 DIAGNOSIS — R79.89 ELEVATED TROPONIN LEVEL NOT DUE MYOCARDIAL INFARCTION: ICD-10-CM

## 2024-12-24 RX ORDER — CARVEDILOL 12.5 MG/1
12.5 TABLET ORAL 2 TIMES DAILY WITH MEALS
Qty: 180 TABLET | Refills: 1 | Status: SHIPPED | OUTPATIENT
Start: 2024-12-24

## 2025-01-28 ENCOUNTER — TELEPHONE (OUTPATIENT)
Age: 75
End: 2025-01-28

## 2025-01-28 NOTE — TELEPHONE ENCOUNTER
I received a fax sheet from [employer] and was scanned into the chart. Patient has not seen  since April of 2023 he needs a follow up. Called pt no answer voicemail was invalid.

## 2025-01-28 NOTE — TELEPHONE ENCOUNTER
Received report from [employer] and made 2 attempts to schedule an appointment for patient to come in and go over report.  Patient was last seen Feb 2024

## 2025-02-08 DIAGNOSIS — I10 PRIMARY HYPERTENSION: ICD-10-CM

## 2025-02-10 RX ORDER — AMLODIPINE BESYLATE 10 MG/1
10 TABLET ORAL DAILY
Qty: 90 TABLET | Refills: 0 | OUTPATIENT
Start: 2025-02-10

## 2025-02-10 NOTE — TELEPHONE ENCOUNTER
Occupational Therapy  Daily Note     Name: Santana Adame  : 1963  MRN: [id number]  Diagnosis: R53.1 Weakness    Visit Information:   OT Insurance Information: [employer]   Total # of Visits Approved: (BMN)  Total # of Visits to Date: 10  Progress Note Due Date: 21  Progress Note Counter: 10/12    Date: 3/29/2021  Time:   OT Manual therapy 15 minutes for 1 unit(s), CPT 04850  OT Therapeutic activities 40 minutes for 3 unit(s), CPT 07327       OT Individual Minutes  Time In: 1500  Time Out: 07  Minutes: 55    Referring Practitioner: Asia Layton PA-C              Subjective:  Patient reports today was her first day back at work. Patient stated she wore her left hand over night and for the first two hours of work. Patient reports her right hand is a little more sore this afternoon. Patient reports she got a home paraffin wax unit and she is planning to set it up this week. Pain rating:   Pre-treatment pain:    2/10, right hand more than left hand     Action for pain:   Patient reports pain is at acceptable level for treatment. Pain after treatment:      3/10         Focus of treatment was on the following:   decreasing fascial restrictions, education/training and strengthening bilateral /pinch     Objective:    Treatment Activity:   Patient educated on ulnar nerve glides to incorporate into HEP. Patient required min verbal/visual cues for proper form. Patient complete 1 set of 3 reps. Patient with good understanding. Patient provided with handout. Patient engages in therapeutic activity to increase bilateral wrist and digit AROM and strength this date. Patient used velcro roller board. Patient utilizes right wrist and digit extension to push and pull medium sized roller back and forth across board x10 with good overall tolerance. Patient also used medium velcro disk with knob on end to imitate using a can opener. Patient rolled back and forth across board x10.  Patient then Patient canceled Medicare Wellness Visit & follow-up appointments that were previously scheduled with Dr. Matt, who is no longer with the practice.     Left message on machine to ask patient to schedule Medicare Wellness Visit & follow-up appointment that is overdue.     Patient last seen in Feb 2024.        completed both of these activities with left wrist and digits. MFR/Manual:   Pt was treated with MFR as noted below:  Upper extremity: transverse plane bilateral  elbow , bilateral  forearm , bilateral  wrist  and bilateral  hand    Patient tolerated well. Patient educated on equipment that could be used at home to assist with opening jars and cans. Patient declined handout for wall mounted jar opener, but stated she would look into it. Patient educated on wrist strengthening exercises. Patient educated wrist flexion, extension, and ulnar/radial deviation with 1# weight. Patient instructed on different light weight items that could be used at home, if not weights available. Patient demonstrated 1 set of 5 reps of each exercise with each hand with 1# weight. Patient tolerated well. Patient provided with handout. Discussed previous HEP: yes, Pt verbally confirmed compliant with HEP's    Comments:     Assessment:   Pt tolerated treatment well. Patient tolerated wrist strengthening exercises with good form. Patient would benefit from continued occupational therapy services to increased bilateral hand strength and decreased fatigue following functional activity. Plan:   Continue POC    Goals:     Long term goals  Time Frame for Long term goals : 2x/week for 4-6 weeks, 8-12 visits  Long term goal 1: Patient will report pain 2 or less during functional activities. Long term goal 2: Patient will be independent with all recommended HEP's, adaptive strategies, and adaptive techniques. Long term goal 3: Patient will report decreased frequency of numbness/tingling in bilateral digits. .    Long term goal 4: Patient will increase bilateral ROM of bilateral wrist/digits from current to [facility] to increase performance with I/ADL's. Long term goal 5: Patient will increase BUE strength from current by 1/2 muscle grade to increase performance with I/ADL's.    Long term goals 6: Patient will increase B  strength from current by 15-20 lbs to increase performance with I/ADL's. Long term goal 7: Patient will increase B pinch strength from current by 3-5 lbs to increase performance with I/ADL's.      Signature: Electronically signed by Alejandrina Mendoza OT on 3/29/2021 at 4:36 PM

## 2025-02-14 DIAGNOSIS — I21.4 NSTEMI (NON-ST ELEVATED MYOCARDIAL INFARCTION) (HCC): ICD-10-CM

## 2025-02-14 DIAGNOSIS — I10 PRIMARY HYPERTENSION: ICD-10-CM

## 2025-02-14 DIAGNOSIS — Z72.0 TOBACCO ABUSE: ICD-10-CM

## 2025-02-14 DIAGNOSIS — E78.2 MIXED HYPERLIPIDEMIA: ICD-10-CM

## 2025-02-14 RX ORDER — SPIRONOLACTONE 25 MG/1
25 TABLET ORAL DAILY
Qty: 30 TABLET | Refills: 1 | Status: SHIPPED | OUTPATIENT
Start: 2025-02-14

## 2025-02-14 RX ORDER — AMLODIPINE BESYLATE 10 MG/1
10 TABLET ORAL DAILY
Qty: 30 TABLET | Refills: 1 | Status: SHIPPED | OUTPATIENT
Start: 2025-02-14

## 2025-02-14 NOTE — TELEPHONE ENCOUNTER
Patient states that he did not get a message to make an appointment since he changed his phone number-phone number has been updated in chart. Patient transferred to scheduling to make an appointment    Reason for call:   [x] Refill   [] Prior Auth  [] Other:     Office:   [x] [facility] - [facility]  Authorized By: Mitchell Matt MD    [] Specialty/Provider -     Medication: spironolactone (ALDACTONE) 25 mg tablet    Dose/Frequency: Take 1 tablet (25 mg total) by mouth daily,    Quantity: 30    Pharmacy: [facility]/pharmacy #[id number]  [place], [place] - [address]     Does the patient have enough for 3 days?   [] Yes   [x] No - Send as HP to POD    Reason for call:   [x] Refill   [] Prior Auth  [] Other:     Office:   [x] [facility]  Authorized By: Mitchell Matt MD    [] Specialty/Provider -     Medication: amLODIPine (NORVASC) 10 mg tablet    Dose/Frequency: TAKE 1 TABLET BY MOUTH EVERY DAY    Quantity: 30    Pharmacy: [facility]/pharmacy #[id number]  [place], [place] - [address]     Does the patient have enough for 3 days?   [] Yes   [x] No - Send as HP to POD

## 2025-03-06 DIAGNOSIS — I10 PRIMARY HYPERTENSION: ICD-10-CM

## 2025-03-06 RX ORDER — AMLODIPINE BESYLATE 10 MG/1
10 TABLET ORAL DAILY
Qty: 30 TABLET | Refills: 0 | Status: SHIPPED | OUTPATIENT
Start: 2025-03-06

## 2025-03-06 NOTE — TELEPHONE ENCOUNTER
Reason for call: pt requesting medication to be resent. Pharmacy states they don't have it   [x] Refill   [] Prior Auth  [] Other:     Office:   [x] PCP/Provider -   [] Specialty/Provider -     Medication: amLODIPine (NORVASC) 10 mg tablet     Dose/Frequency: Take 1 tablet (10 mg total) by mouth daily,     Quantity:  30 tablet     Pharmacy:  [facility]/pharmacy #[id number]  [place], [place] - [address]   Does the patient have enough for 3 days?   [] Yes   [x] No - Send as HP to POD

## 2025-03-10 DIAGNOSIS — E78.2 MIXED HYPERLIPIDEMIA: ICD-10-CM

## 2025-03-10 DIAGNOSIS — I21.4 NSTEMI (NON-ST ELEVATED MYOCARDIAL INFARCTION) (HCC): ICD-10-CM

## 2025-03-10 DIAGNOSIS — Z72.0 TOBACCO ABUSE: ICD-10-CM

## 2025-03-10 DIAGNOSIS — I10 PRIMARY HYPERTENSION: ICD-10-CM

## 2025-03-11 RX ORDER — SPIRONOLACTONE 25 MG/1
25 TABLET ORAL DAILY
Qty: 90 TABLET | Refills: 1 | OUTPATIENT
Start: 2025-03-11

## 2025-03-17 ENCOUNTER — OFFICE VISIT (OUTPATIENT)
Age: 75
End: 2025-03-17
Payer: COMMERCIAL

## 2025-03-17 VITALS
OXYGEN SATURATION: 99 % | SYSTOLIC BLOOD PRESSURE: 148 MMHG | TEMPERATURE: 96.5 F | HEIGHT: 70 IN | DIASTOLIC BLOOD PRESSURE: 73 MMHG | HEART RATE: 75 BPM | RESPIRATION RATE: 16 BRPM | BODY MASS INDEX: 23.11 KG/M2 | WEIGHT: 161.4 LBS

## 2025-03-17 DIAGNOSIS — J43.2 CENTRILOBULAR EMPHYSEMA (HCC): ICD-10-CM

## 2025-03-17 DIAGNOSIS — I25.119 ATHEROSCLEROSIS OF NATIVE CORONARY ARTERY OF NATIVE HEART WITH ANGINA PECTORIS (HCC): Primary | ICD-10-CM

## 2025-03-17 DIAGNOSIS — E78.2 MIXED HYPERLIPIDEMIA: ICD-10-CM

## 2025-03-17 DIAGNOSIS — I73.9 PAD (PERIPHERAL ARTERY DISEASE) (HCC): ICD-10-CM

## 2025-03-17 DIAGNOSIS — I21.4 NSTEMI (NON-ST ELEVATED MYOCARDIAL INFARCTION) (HCC): ICD-10-CM

## 2025-03-17 DIAGNOSIS — Z12.5 SCREENING FOR PROSTATE CANCER: ICD-10-CM

## 2025-03-17 DIAGNOSIS — Z95.5 S/P DRUG ELUTING CORONARY STENT PLACEMENT: ICD-10-CM

## 2025-03-17 DIAGNOSIS — Z72.0 TOBACCO ABUSE: ICD-10-CM

## 2025-03-17 DIAGNOSIS — I25.10 CAD (CORONARY ARTERY DISEASE): ICD-10-CM

## 2025-03-17 DIAGNOSIS — I10 PRIMARY HYPERTENSION: ICD-10-CM

## 2025-03-17 DIAGNOSIS — I50.32 CHRONIC DIASTOLIC CONGESTIVE HEART FAILURE (HCC): ICD-10-CM

## 2025-03-17 DIAGNOSIS — Z00.00 MEDICARE ANNUAL WELLNESS VISIT, SUBSEQUENT: ICD-10-CM

## 2025-03-17 PROCEDURE — 99214 OFFICE O/P EST MOD 30 MIN: CPT | Performed by: INTERNAL MEDICINE

## 2025-03-17 PROCEDURE — G0444 DEPRESSION SCREEN ANNUAL: HCPCS | Performed by: INTERNAL MEDICINE

## 2025-03-17 PROCEDURE — G2211 COMPLEX E/M VISIT ADD ON: HCPCS | Performed by: INTERNAL MEDICINE

## 2025-03-17 PROCEDURE — G0439 PPPS, SUBSEQ VISIT: HCPCS | Performed by: INTERNAL MEDICINE

## 2025-03-17 RX ORDER — SPIRONOLACTONE 25 MG/1
25 TABLET ORAL DAILY
Qty: 90 TABLET | Refills: 1 | Status: SHIPPED | OUTPATIENT
Start: 2025-03-17

## 2025-03-17 RX ORDER — AMLODIPINE BESYLATE 10 MG/1
10 TABLET ORAL DAILY
Qty: 90 TABLET | Refills: 0
Start: 2025-03-17

## 2025-03-17 NOTE — ASSESSMENT & PLAN NOTE
Continues to smoke  Counseled for smoking cessation  Orders:    spironolactone (ALDACTONE) 25 mg tablet; Take 1 tablet (25 mg total) by mouth daily

## 2025-03-17 NOTE — ASSESSMENT & PLAN NOTE
Orders:    spironolactone (ALDACTONE) 25 mg tablet; Take 1 tablet (25 mg total) by mouth daily    amLODIPine (NORVASC) 10 mg tablet; Take 1 tablet (10 mg total) by mouth daily    CBC and differential; Future    Comprehensive metabolic panel; Future    Albumin / creatinine urine ratio; Future    Ambulatory referral to Cardiology; Future

## 2025-03-17 NOTE — ASSESSMENT & PLAN NOTE
Orders:    spironolactone (ALDACTONE) 25 mg tablet; Take 1 tablet (25 mg total) by mouth daily

## 2025-03-17 NOTE — ASSESSMENT & PLAN NOTE
Stable, continue current medications  Orders:    spironolactone (ALDACTONE) 25 mg tablet; Take 1 tablet (25 mg total) by mouth daily    amLODIPine (NORVASC) 10 mg tablet; Take 1 tablet (10 mg total) by mouth daily    CBC and differential; Future    Comprehensive metabolic panel; Future    Albumin / creatinine urine ratio; Future    Ambulatory referral to Cardiology; Future

## 2025-03-17 NOTE — PATIENT INSTRUCTIONS
Medicare Preventive Visit Patient Instructions  Thank you for completing your Welcome to Medicare Visit or Medicare Annual Wellness Visit today. Your next wellness visit will be due in one year (3/18/2026).  The screening/preventive services that you may require over the next 5-10 years are detailed below. Some tests may not apply to you based off risk factors and/or age. Screening tests ordered at today's visit but not completed yet may show as past due. Also, please note that scanned in results may not display below.  Preventive Screenings:  Service Recommendations Previous Testing/Comments   Colorectal Cancer Screening  Colonoscopy    Fecal Occult Blood Test (FOBT)/Fecal Immunochemical Test (FIT)  Fecal DNA/Cologuard Test  Flexible Sigmoidoscopy Age: 45-75 years old   Colonoscopy: every 10 years (May be performed more frequently if at higher risk)  OR  FOBT/FIT: every 1 year  OR  Cologuard: every 3 years  OR  Sigmoidoscopy: every 5 years  Screening may be recommended earlier than age 45 if at higher risk for colorectal cancer. Also, an individualized decision between you and your healthcare provider will decide whether screening between the ages of 76-85 would be appropriate. Colonoscopy: Not on file  FOBT/FIT: Not on file  Cologuard: Not on file  Sigmoidoscopy: Not on file          Prostate Cancer Screening Individualized decision between patient and health care provider in men between ages of 55-69   Medicare will cover every 12 months beginning on the day after your 50th birthday PSA: 3.1 ng/mL     Screening Not Indicated     Hepatitis C Screening Once for adults born between 1945 and 1965  More frequently in patients at high risk for Hepatitis C Hep C Antibody: 03/14/2022    Screening Current   Diabetes Screening 1-2 times per year if you're at risk for diabetes or have pre-diabetes Fasting glucose: 101 mg/dL (3/14/2022)  A1C: No results in last 5 years (No results in last 5 years)  Screening Current    Cholesterol Screening Once every 5 years if you don't have a lipid disorder. May order more often based on risk factors. Lipid panel: 06/28/2022  Screening Not Indicated  History Lipid Disorder      Other Preventive Screenings Covered by Medicare:  Abdominal Aortic Aneurysm (AAA) Screening: covered once if your at risk. You're considered to be at risk if you have a family history of AAA or a male between the age of 65-75 who smoking at least 100 cigarettes in your lifetime.  Lung Cancer Screening: covers low dose CT scan once per year if you meet all of the following conditions: (1) Age 55-77; (2) No signs or symptoms of lung cancer; (3) Current smoker or have quit smoking within the last 15 years; (4) You have a tobacco smoking history of at least 20 pack years (packs per day x number of years you smoked); (5) You get a written order from a healthcare provider.  Glaucoma Screening: covered annually if you're considered high risk: (1) You have diabetes OR (2) Family history of glaucoma OR (3)  aged 50 and older OR (4)  American aged 65 and older  Osteoporosis Screening: covered every 2 years if you meet one of the following conditions: (1) Have a vertebral abnormality; (2) On glucocorticoid therapy for more than 3 months; (3) Have primary hyperparathyroidism; (4) On osteoporosis medications and need to assess response to drug therapy.  HIV Screening: covered annually if you're between the age of 15-65. Also covered annually if you are younger than 15 and older than 65 with risk factors for HIV infection. For pregnant patients, it is covered up to 3 times per pregnancy.    Immunizations:  Immunization Recommendations   Influenza Vaccine Annual influenza vaccination during flu season is recommended for all persons aged >= 6 months who do not have contraindications   Pneumococcal Vaccine   * Pneumococcal conjugate vaccine = PCV13 (Prevnar 13), PCV15 (Vaxneuvance), PCV20 (Prevnar 20)  *  Pneumococcal polysaccharide vaccine = PPSV23 (Pneumovax) Adults 19-65 yo with certain risk factors or if 65+ yo  If never received any pneumonia vaccine: recommend Prevnar 20 (PCV20)  Give PCV20 if previously received 1 dose of PCV13 or PPSV23   Hepatitis B Vaccine 3 dose series if at intermediate or high risk (ex: diabetes, end stage renal disease, liver disease)   Respiratory syncytial virus (RSV) Vaccine - COVERED BY MEDICARE PART D  * RSVPreF3 (Arexvy) CDC recommends that adults 60 years of age and older may receive a single dose of RSV vaccine using shared clinical decision-making (SCDM)   Tetanus (Td) Vaccine - COST NOT COVERED BY MEDICARE PART B Following completion of primary series, a booster dose should be given every 10 years to maintain immunity against tetanus. Td may also be given as tetanus wound prophylaxis.   Tdap Vaccine - COST NOT COVERED BY MEDICARE PART B Recommended at least once for all adults. For pregnant patients, recommended with each pregnancy.   Shingles Vaccine (Shingrix) - COST NOT COVERED BY MEDICARE PART B  2 shot series recommended in those 19 years and older who have or will have weakened immune systems or those 50 years and older     Health Maintenance Due:      Topic Date Due   • Colorectal Cancer Screening  Never done   • Hepatitis C Screening  Completed     Immunizations Due:      Topic Date Due   • Pneumococcal Vaccine: 65+ Years (1 of 2 - PCV) Never done   • Influenza Vaccine (1) Never done   • COVID-19 Vaccine (1 - 2024-25 season) Never done     Advance Directives   What are advance directives?  Advance directives are legal documents that state your wishes and plans for medical care. These plans are made ahead of time in case you lose your ability to make decisions for yourself. Advance directives can apply to any medical decision, such as the treatments you want, and if you want to donate organs.   What are the types of advance directives?  There are many types of advance  directives, and each state has rules about how to use them. You may choose a combination of any of the following:  Living will:  This is a written record of the treatment you want. You can also choose which treatments you do not want, which to limit, and which to stop at a certain time. This includes surgery, medicine, IV fluid, and tube feedings.   Durable power of  for healthcare (DPAHC):  This is a written record that states who you want to make healthcare choices for you when you are unable to make them for yourself. This person, called a proxy, is usually a family member or a friend. You may choose more than 1 proxy.  Do not resuscitate (DNR) order:  A DNR order is used in case your heart stops beating or you stop breathing. It is a request not to have certain forms of treatment, such as CPR. A DNR order may be included in other types of advance directives.  Medical directive:  This covers the care that you want if you are in a coma, near death, or unable to make decisions for yourself. You can list the treatments you want for each condition. Treatment may include pain medicine, surgery, blood transfusions, dialysis, IV or tube feedings, and a ventilator (breathing machine).  Values history:  This document has questions about your views, beliefs, and how you feel and think about life. This information can help others choose the care that you would choose.  Why are advance directives important?  An advance directive helps you control your care. Although spoken wishes may be used, it is better to have your wishes written down. Spoken wishes can be misunderstood, or not followed. Treatments may be given even if you do not want them. An advance directive may make it easier for your family to make difficult choices about your care.   Cigarette Smoking and Your Health   Risks to your health if you smoke:  Nicotine and other chemicals found in tobacco damage every cell in your body. Even if you are a light  smoker, you have an increased risk for cancer, heart disease, and lung disease. If you are pregnant or have diabetes, smoking increases your risk for complications.   Benefits to your health if you stop smoking:   You decrease respiratory symptoms such as coughing, wheezing, and shortness of breath.   You reduce your risk for cancers of the lung, mouth, throat, kidney, bladder, pancreas, stomach, and cervix. If you already have cancer, you increase the benefits of chemotherapy. You also reduce your risk for cancer returning or a second cancer from developing.   You reduce your risk for heart disease, blood clots, heart attack, and stroke.   You reduce your risk for lung infections, and diseases such as pneumonia, asthma, chronic bronchitis, and emphysema.  Your circulation improves. More oxygen can be delivered to your body. If you have diabetes, you lower your risk for complications, such as kidney, artery, and eye diseases. You also lower your risk for nerve damage. Nerve damage can lead to amputations, poor vision, and blindness.  You improve your body's ability to heal and to fight infections.  For more information and support to stop smoking:   Smokefree.gov  Phone: 0- 993 - 783-4176  Web Address: www.smokefree.gov     © Copyright [employer] 2018 Information is for End User's use only and may not be sold, redistributed or otherwise used for commercial purposes. All illustrations and images included in CareNotes® are the copyrighted property of [employer] Inc[employer] or [employer]

## 2025-03-17 NOTE — ASSESSMENT & PLAN NOTE
Needs updated FLP, continue Lipitor  Orders:    spironolactone (ALDACTONE) 25 mg tablet; Take 1 tablet (25 mg total) by mouth daily    Lipid panel; Future

## 2025-03-17 NOTE — ASSESSMENT & PLAN NOTE
Stable without any angina, continue carvedilol, Brilinta and Lipitor  Willing to follow-up with cardiology, referral has been given  Orders:    CBC and differential; Future    Comprehensive metabolic panel; Future    Albumin / creatinine urine ratio; Future    Ambulatory referral to Cardiology; Future

## 2025-03-17 NOTE — ASSESSMENT & PLAN NOTE
Orders:    spironolactone (ALDACTONE) 25 mg tablet; Take 1 tablet (25 mg total) by mouth daily    Lipid panel; Future

## 2025-03-17 NOTE — ASSESSMENT & PLAN NOTE
Orders:    spironolactone (ALDACTONE) 25 mg tablet; Take 1 tablet (25 mg total) by mouth daily    ticagrelor (BRILINTA) 90 MG; Take 1 tablet (90 mg total) by mouth every 12 (twelve) hours    CBC and differential; Future    Comprehensive metabolic panel; Future    Albumin / creatinine urine ratio; Future    Ambulatory referral to Cardiology; Future

## 2025-03-17 NOTE — PROGRESS NOTES
Name: Josh Olsen      : 1950      MRN: [id number]  Encounter Provider: Mitchell Matt MD  Encounter Date: 3/17/2025   Encounter department: [facility] PRIMARY CARE    Assessment & Plan  PAD (peripheral artery disease) (HCC)  CTA neck in  with atherosclerotic changes in the proximal cervical internal carotid arteries  Continue statin and Brilinta       Primary hypertension  Stable, continue current medications  Orders:    spironolactone (ALDACTONE) 25 mg tablet; Take 1 tablet (25 mg total) by mouth daily    amLODIPine (NORVASC) 10 mg tablet; Take 1 tablet (10 mg total) by mouth daily    CBC and differential; Future    Comprehensive metabolic panel; Future    Albumin / creatinine urine ratio; Future    Ambulatory referral to Cardiology; Future    Atherosclerosis of native coronary artery of native heart with angina pectoris (HCC)  Stable without any angina, continue carvedilol, Brilinta and Lipitor  Willing to follow-up with cardiology, referral has been given  Orders:    CBC and differential; Future    Comprehensive metabolic panel; Future    Albumin / creatinine urine ratio; Future    Ambulatory referral to Cardiology; Future    Tobacco abuse  Continues to smoke  Counseled for smoking cessation  Orders:    spironolactone (ALDACTONE) 25 mg tablet; Take 1 tablet (25 mg total) by mouth daily    Centrilobular emphysema (HCC)  Stable without any exertional dyspnea       Mixed hyperlipidemia  Needs updated FLP, continue Lipitor  Orders:    spironolactone (ALDACTONE) 25 mg tablet; Take 1 tablet (25 mg total) by mouth daily    Lipid panel; Future    Mixed hyperlipidemia    Orders:    spironolactone (ALDACTONE) 25 mg tablet; Take 1 tablet (25 mg total) by mouth daily    Lipid panel; Future    Tobacco abuse    Orders:    spironolactone (ALDACTONE) 25 mg tablet; Take 1 tablet (25 mg total) by mouth daily    Primary hypertension    Orders:    spironolactone (ALDACTONE) 25 mg tablet; Take 1 tablet  (25 mg total) by mouth daily    amLODIPine (NORVASC) 10 mg tablet; Take 1 tablet (10 mg total) by mouth daily    CBC and differential; Future    Comprehensive metabolic panel; Future    Albumin / creatinine urine ratio; Future    Ambulatory referral to Cardiology; Future    NSTEMI (non-ST elevated myocardial infarction) ([facility])    Orders:    spironolactone (ALDACTONE) 25 mg tablet; Take 1 tablet (25 mg total) by mouth daily    ticagrelor (BRILINTA) 90 MG; Take 1 tablet (90 mg total) by mouth every 12 (twelve) hours    CBC and differential; Future    Comprehensive metabolic panel; Future    Albumin / creatinine urine ratio; Future    Ambulatory referral to Cardiology; Future    NSTEMI (non-ST elevated myocardial infarction) ([facility])    Orders:    spironolactone (ALDACTONE) 25 mg tablet; Take 1 tablet (25 mg total) by mouth daily    ticagrelor (BRILINTA) 90 MG; Take 1 tablet (90 mg total) by mouth every 12 (twelve) hours    CBC and differential; Future    Comprehensive metabolic panel; Future    Albumin / creatinine urine ratio; Future    Ambulatory referral to Cardiology; Future    CAD (coronary artery disease)    Orders:    ticagrelor (BRILINTA) 90 MG; Take 1 tablet (90 mg total) by mouth every 12 (twelve) hours    S/P drug eluting coronary stent placement    Orders:    ticagrelor (BRILINTA) 90 MG; Take 1 tablet (90 mg total) by mouth every 12 (twelve) hours    Primary hypertension    Orders:    spironolactone (ALDACTONE) 25 mg tablet; Take 1 tablet (25 mg total) by mouth daily    amLODIPine (NORVASC) 10 mg tablet; Take 1 tablet (10 mg total) by mouth daily    CBC and differential; Future    Comprehensive metabolic panel; Future    Albumin / creatinine urine ratio; Future    Ambulatory referral to Cardiology; Future    Chronic diastolic congestive heart failure (HCC)  Wt Readings from Last 3 Encounters:   03/17/25 73.2 kg (161 lb 6.4 oz)   02/27/24 77.1 kg (170 lb)   07/17/23 73.5 kg (162 lb)                    Medicare  annual wellness visit, subsequent         Screening for prostate cancer    Orders:    PSA, Total Screen; Future      Depression Screening and Follow-up Plan: Patient was screened for depression during today's encounter. They screened negative with a PHQ-2 score of 2.      Tobacco Cessation Counseling: Tobacco cessation counseling was provided. The patient is sincerely urged to quit consumption of tobacco. He is not ready to quit tobacco. Medication options discussed.       Preventive health issues were discussed with patient, and age appropriate screening tests were ordered as noted in patient's After Visit Summary. Personalized health advice and appropriate referrals for health education or preventive services given if needed, as noted in patient's After Visit Summary.    History of Present Illness     Comes for follow-up of multiple chronic medical conditions as noted above.  He denies any subjective complaints today.  Continues to smoke and is unwilling to quit.  And was counseled for smoking cessation.       Patient Care Team:  Mitchell Matt MD as PCP - General (Internal Medicine)    Review of Systems   Constitutional:  Negative for activity change, appetite change, chills, diaphoresis, fatigue, fever and unexpected weight change.   HENT:  Negative for congestion and sore throat.    Respiratory:  Negative for apnea, cough, choking, chest tightness, shortness of breath, wheezing and stridor.    Cardiovascular:  Negative for chest pain, palpitations and leg swelling.   Gastrointestinal:  Negative for abdominal distention, abdominal pain, blood in stool, constipation, nausea and rectal pain.   Genitourinary:  Negative for dysuria, flank pain, frequency and urgency.   Musculoskeletal:  Negative for arthralgias, back pain, gait problem, joint swelling and myalgias.   Skin:  Negative for color change, pallor and rash.   Neurological:  Negative for headaches.     Medical History Reviewed by provider this encounter:        Annual Wellness Visit Questionnaire   Josh is here for his Subsequent Wellness visit. Last Medicare Wellness visit information reviewed, patient interviewed and updates made to the record today.      Health Risk Assessment:   Patient rates overall health as good. Patient feels that their physical health rating is slightly better. Patient is very satisfied with their life. Eyesight was rated as slightly better. Hearing was rated as same. Patient feels that their emotional and mental health rating is same. Patients states they are never, rarely angry. Patient states they are never, rarely unusually tired/fatigued. Pain experienced in the last 7 days has been none. Patient states that he has experienced weight loss or gain in last 6 months.     Depression Screening:   PHQ-2 Score: 2      Fall Risk Screening:   In the past year, patient has experienced: no history of falling in past year      Home Safety:  Patient does not have trouble with stairs inside or outside of their home. Patient has working smoke alarms and has working carbon monoxide detector. Home safety hazards include: none.     Nutrition:   Current diet is Regular.     Medications:   Patient is not currently taking any over-the-counter supplements. Patient is able to manage medications.     Activities of Daily Living (ADLs)/Instrumental Activities of Daily Living (IADLs):   Walk and transfer into and out of bed and chair?: Yes  Dress and groom yourself?: Yes    Bathe or shower yourself?: Yes    Feed yourself? Yes  Do your laundry/housekeeping?: Yes  Manage your money, pay your bills and track your expenses?: Yes  Make your own meals?: Yes    Do your own shopping?: Yes    Previous Hospitalizations:   Any hospitalizations or ED visits within the last 12 months?: Yes    How many hospitalizations have you had in the last year?: 1-2    Advance Care Planning:   Living will: No    Durable POA for healthcare: No    Advanced directive: No    Advanced  directive counseling given: Yes    ACP document given: Yes      Cognitive Screening:   Provider or family/friend/caregiver concerned regarding cognition?: No    PREVENTIVE SCREENINGS      Cardiovascular Screening:    General: Screening Not Indicated and History Lipid Disorder      Diabetes Screening:     General: Screening Current      Colorectal Cancer Screening:     General: Screening Not Indicated      Prostate Cancer Screening:    General: Screening Not Indicated      Osteoporosis Screening:    General: Screening Not Indicated      Abdominal Aortic Aneurysm (AAA) Screening:    Risk factors include: age between 65-76 yo and tobacco use        Lung Cancer Screening:     General: Screening Not Indicated      Hepatitis C Screening:    General: Screening Current    Screening, Brief Intervention, and Referral to Treatment (SBIRT)     Screening  Typical number of drinks in a day: 0  Typical number of drinks in a week: 0  Interpretation: Low risk drinking behavior.    AUDIT-C Screenin) How often did you have a drink containing alcohol in the past year? 2 to 4 times a month  2) How many drinks did you have on a typical day when you were drinking in the past year? 1 to 2  3) How often did you have 6 or more drinks on one occasion in the past year? never    AUDIT-C Score: 2  Interpretation: Score 0-3 (male): Negative screen for alcohol misuse    Single Item Drug Screening:  How often have you used an illegal drug (including marijuana) or a prescription medication for non-medical reasons in the past year? never    Single Item Drug Screen Score: 0  Interpretation: Negative screen for possible drug use disorder    Annual Depression Screening  Time spent screening and evaluating the patient for depression during today's encounter was 5 minutes.    Other Counseling Topics:   Car/seat belt/driving safety, skin self-exam, sunscreen and calcium and vitamin D intake and regular weightbearing exercise.     Social Drivers of  Health     Financial Resource Strain: Low Risk  (3/17/2025)    Overall Financial Resource Strain (CARDIA)     Difficulty of Paying Living Expenses: Not hard at all   Food Insecurity: No Food Insecurity (3/17/2025)    Hunger Vital Sign     Worried About Running Out of Food in the Last Year: Never true     Ran Out of Food in the Last Year: Never true   Transportation Needs: No Transportation Needs (3/17/2025)    PRAPARE - Transportation     Lack of Transportation (Medical): No     Lack of Transportation (Non-Medical): No   Housing Stability: Unknown (3/17/2025)    Housing Stability Vital Sign     Unable to Pay for Housing in the Last Year: No     Homeless in the Last Year: No   Utilities: Not At Risk (3/17/2025)    [facility] Utilities     Threatened with loss of utilities: No     No results found.      Past Medical History   Past Medical History:   Diagnosis Date    Hypertension      Past Surgical History:   Procedure Laterality Date    ARTERY SURGERY  2018    CARDIAC CATHETERIZATION N/A 6/28/2022    Procedure: CARDIAC CATHETERIZATION;  Surgeon: Billy Ansari MD;  Location: [facility];  Service: Cardiology    CARDIAC CATHETERIZATION N/A 6/28/2022    Procedure: Cardiac pci;  Surgeon: Billy Ansari MD;  Location: [facility];  Service: Cardiology     Family History   Problem Relation Age of Onset    Stomach cancer Mother     No Known Problems Father       reports that he has been smoking cigarettes. He started smoking about 50 years ago. He has a 12.5 pack-year smoking history. He has never used smokeless tobacco. He reports current alcohol use. He reports that he does not use drugs.  Current Outpatient Medications   Medication Instructions    amLODIPine (NORVASC) 10 mg, Oral, Daily    atorvastatin (LIPITOR) 40 mg, Oral, Daily with dinner    carvedilol (COREG) 12.5 mg, Oral, 2 times daily with meals    [employer] Aspirin Adult Low Dose 81 MG chewable tablet CHEW 1 TABLET BY MOUTH DAILY    furosemide (LASIX) 20 mg,  "Oral, Daily PRN    nitroglycerin (NITROSTAT) 0.4 mg, Sublingual, Every 5 minutes PRN    spironolactone (ALDACTONE) 25 mg, Oral, Daily    ticagrelor (BRILINTA) 90 mg, Oral, Every 12 hours scheduled   No Known Allergies   Objective   /73 (Patient Position: Sitting, Cuff Size: Large)   Pulse 75   Temp (!) 96.5 °F (35.8 °C) (Tympanic)   Resp 16   Ht 5' 10\" (1.778 m)   Wt 73.2 kg (161 lb 6.4 oz)   SpO2 99%   BMI 23.16 kg/m²     Physical Exam  Constitutional:       General: He is not in acute distress.     Appearance: Normal appearance. He is normal weight. He is not ill-appearing, toxic-appearing or diaphoretic.   Cardiovascular:      Rate and Rhythm: Normal rate and regular rhythm.      Pulses: Normal pulses.      Heart sounds: Normal heart sounds. No murmur heard.     No gallop.   Pulmonary:      Effort: Pulmonary effort is normal. No respiratory distress.      Breath sounds: Normal breath sounds. No stridor. No wheezing, rhonchi or rales.   Chest:      Chest wall: No tenderness.   Musculoskeletal:      Right lower leg: No edema.      Left lower leg: No edema.   Neurological:      Mental Status: He is alert and oriented to person, place, and time.         "

## 2025-03-21 DIAGNOSIS — I21.4 NSTEMI (NON-ST ELEVATED MYOCARDIAL INFARCTION) (HCC): ICD-10-CM

## 2025-03-21 RX ORDER — ATORVASTATIN CALCIUM 40 MG/1
40 TABLET, FILM COATED ORAL
Qty: 30 TABLET | Refills: 0 | Status: SHIPPED | OUTPATIENT
Start: 2025-03-21 | End: 2025-09-17

## 2025-03-21 NOTE — TELEPHONE ENCOUNTER
Reason for call:   [x] Refill   [] Prior Auth  [] Other:     Office:   [x] PCP/Provider - Mitchell Matt MD   [] Specialty/Provider -     Medication: atorvastatin 40 mg    Dose/Frequency: 1 tab daily    Quantity: 90 tabs    Pharmacy: [facility]/pharmacy #[id number] - [place], [place] - [address] [facility]   Does the patient have enough for 3 days?   [] Yes   [x] No - Send as HP to POD    Mail Away Pharmacy   Does the patient have enough for 10 days?   [] Yes   [] No - Send as HP to POD

## 2025-04-13 DIAGNOSIS — I21.4 NSTEMI (NON-ST ELEVATED MYOCARDIAL INFARCTION) (HCC): ICD-10-CM

## 2025-04-14 RX ORDER — ATORVASTATIN CALCIUM 40 MG/1
40 TABLET, FILM COATED ORAL
Qty: 90 TABLET | Refills: 1 | Status: SHIPPED | OUTPATIENT
Start: 2025-04-14

## 2025-05-15 ENCOUNTER — CONSULT (OUTPATIENT)
Dept: CARDIOLOGY CLINIC | Facility: CLINIC | Age: 75
End: 2025-05-15
Payer: COMMERCIAL

## 2025-05-15 VITALS
DIASTOLIC BLOOD PRESSURE: 84 MMHG | SYSTOLIC BLOOD PRESSURE: 118 MMHG | BODY MASS INDEX: 23.15 KG/M2 | HEART RATE: 66 BPM | OXYGEN SATURATION: 98 % | WEIGHT: 161.7 LBS | HEIGHT: 70 IN

## 2025-05-15 DIAGNOSIS — I73.9 PAD (PERIPHERAL ARTERY DISEASE) (HCC): ICD-10-CM

## 2025-05-15 DIAGNOSIS — Z72.0 TOBACCO ABUSE: ICD-10-CM

## 2025-05-15 DIAGNOSIS — I21.4 NSTEMI (NON-ST ELEVATED MYOCARDIAL INFARCTION) (HCC): ICD-10-CM

## 2025-05-15 DIAGNOSIS — I10 PRIMARY HYPERTENSION: ICD-10-CM

## 2025-05-15 DIAGNOSIS — I25.10 CORONARY ARTERY DISEASE INVOLVING NATIVE CORONARY ARTERY OF NATIVE HEART WITHOUT ANGINA PECTORIS: ICD-10-CM

## 2025-05-15 DIAGNOSIS — I50.32 CHRONIC DIASTOLIC HEART FAILURE (HCC): ICD-10-CM

## 2025-05-15 DIAGNOSIS — E78.2 MIXED HYPERLIPIDEMIA: Primary | ICD-10-CM

## 2025-05-15 PROCEDURE — 99204 OFFICE O/P NEW MOD 45 MIN: CPT | Performed by: INTERNAL MEDICINE

## 2025-05-15 RX ORDER — ASPIRIN 81 MG/1
81 TABLET, CHEWABLE ORAL DAILY
Qty: 90 TABLET | Refills: 1 | Status: SHIPPED | OUTPATIENT
Start: 2025-05-15

## 2025-05-15 NOTE — PROGRESS NOTES
Cardiology     Clinic Visit Note  Josh Olsen 75 y.o. male   MRN: [id number]    Assessment and Plan      CAD s/p ALLEN x3   - Cath from 2022 with 50% LAD, 70% mid circumflex, 90% distal RCA, 70% mid RCA  - NSTEMI in 2022 with ALLEN to left circumflex, mid to distal RCA  - Echo in 2022 demonstrated EF of 55%, inferior, inferior lateral wall motion abnormalities, grade 1 diastolic dysfunction, mild AI  - Repeat echo 1 month later in 2022 demonstrated EF 65%, basal inferior hypokinesis, grade 1 diastolic dysfunction, mild AI  - Completed cardiac rehab in 2022  - Remains on aspirin, atorvastatin 40 mg daily, Brilinta, carvedilol 12.5 mg twice daily  - Given that we are 3 years out from his ALLEN can discontinue Brilinta and continue aspirin 81 mg daily  - Most recent FLP was from 2022 with LDL of 104  - Repeat FLP and if not at goal of less than 55 will add Zetia or PCSK9 inhibitor    PAD s/p R fem pop bypass   - Status post right femoropopliteal bypass 2017  - Remains on aspirin, Brilinta, atorvastatin  - No symptoms of claudication  - repeat lipid panel as above    Chronic diastolic heart failure  - Appears euvolemic on exam today  - Echo from 7/27/2022 demonstrated EF of 65%, grade 1 diastolic dysfunction, basal inferior wall hypokinesis  - weight in office today: 161 pounds   - patient weighs himself regularly and weight is always 161-162 pounds   - has lasix 20 mg as needed for lower extremity edema (only uses it a few times a week)   - discussed fluid and sodium restriction   - remains on carvedilol   - discontinuing spironolactone due to hypotension after taking   - if patient finds he needs more frequent lasix after stopping spironolactone, we can resume spironolactone and discontinue amlodipine     Hypertension  - well controlled with /84 in office today   - Has had 1 episode of hypotension in the last month after taking evening blood pressure meds  - Continue amlodipine 10 mg daily and carvedilol 12.5 mg  twice daily  - Stop spironolactone, if needing more diuretics after coming off spironolactone will resume and discontinue amlodipine  - Patient monitors blood pressure daily  - Discussed low-sodium diet which he is compliant with     Hyperlipidemia  - Recent lipid panel from 2022 with LDL of 104  - LDL goal less than 55 given history of MI  - Repeat lipid panel ordered  - Continue atorvastatin 40 mg daily  - If LDL is still above goal on repeat lipid panel will add Zetia or PCSK9 inhibitor    Centrilobular emphysema  - Stable  - No dyspnea on exertion  - Not on any inhalers  - Management per primary care physician     Tobacco use  - Encourage smoking cessation  - Smokes quarter pack a day and has smoked for 50 years      Schedule a follow-up appointment in 3 months.     Chief Complaint: Establish cardiac care  Subjective     History of Present Illness:  Patient is a 75-year-old male with past medical history of NSTEMI with ALLEN x 3, hypertension, hyperlipidemia, PAD status post lower extremity bypass, tobacco use, centrilobular emphysema, chronic diastolic heart failure who is presenting to establish cardiac care.  Patient states that his PCP recommended he establish care with a cardiologist due to his history of CAD and PAD as well as diastolic heart failure.  Patient states that he is feeling well.  He has not had any chest pain, shortness of breath, orthopnea, PND.  He is on Lasix 20 mg as needed for swelling and states that he uses this approximately twice a month due to swelling in his right lower extremity.  He believes that he is on too much blood pressure medication as he has noted a blood pressure of 70s over 60s after taking his evening blood pressure medication.  Takes his spironolactone and carvedilol in the evening.    Patient's cardiac history includes an NSTEMI in 2022 for which she received ALLEN to mid circumflex, distal RCA, mid RCA.  He was placed on Brilinta and aspirin at this time and advised  about smoking cessation.  During this hospitalization he had a mild CHF exacerbation due to diastolic heart failure and was placed on diuretics.  Echo during that admission demonstrated EF of 60%, grade 1 diastolic dysfunction.  Echo did show hypokinesis of the basal inferior, basal inferolateral, mid inferior and mid inferolateral segments.  Repeat echo 7/2022 demonstrated EF of 65% and basal inferior hypokinesis with grade 1 diastolic dysfunction.  Patient has a history of PAD with right femoropopliteal bypass in 2017.      Previous Cardiology Workup:  TREADMILL STRESS  No results found for this or any previous visit.     ----------------------------------------------------------------------------------------------  NUCLEAR STRESS TEST: No results found for this or any previous visit.    No results found for this or any previous visit.      --------------------------------------------------------------------------------  CATH:    Left heart catheterization 6/28/2022  Impression       Prox LAD lesion is 50% stenosed.  Mid Cx lesion is 70% stenosed.  Dist RCA lesion is 90% stenosed.  Mid RCA lesion is 70% stenosed.     Significant CAD involving the mid circumflex, mid RCA and distal RCA. A 50% proximal LAD stenosis was not flow-limiting by iFR (iFR=0.92).   Successful IVUS-guided PCI of the mid circumflex (3.0x18mm, distal RCA (2.94h08wy) and mid RCA (3.5x23) (non-overlapping).   Plan: DAPT, statin, smoking cessation, cardiac rehabilitation.    --------------------------------------------------------------------------------  ECHO:     Follow-up transthoracic echo 7/27/2022    Interpretation Summary  Show Result Comparison     Left Ventricle: Left ventricular cavity size is normal. Wall thickness is increased. The left ventricular ejection fraction is 65% by visual estimation. Systolic function is normal. Diastolic function is mildly abnormal, consistent with grade I (abnormal) relaxation.    The following segments  are hypokinetic: basal inferior.    All other segments are normal.    Right Ventricle: Right ventricular cavity size is normal. Systolic function is normal.    Aortic Valve: There is mild regurgitation.    Mitral Valve: There is mild regurgitation.    Tricuspid Valve: There is mild regurgitation.    Aorta: The aortic root is normal in size. The ascending aorta is mildly dilated at 3.9 cm.     Findings    Left Ventricle Left ventricular cavity size is normal. Wall thickness is increased. The left ventricular ejection fraction is 65% by visual estimation. Systolic function is normal.  Diastolic function is mildly abnormal, consistent with grade I (abnormal) relaxation.   Wall Scoring Baseline     The following segments are hypokinetic: basal inferior.  All other segments are normal.            Right Ventricle Right ventricular cavity size is normal. Systolic function is normal.   Aortic Valve The aortic valve is trileaflet. The leaflets are mildly thickened. The leaflets are mildly calcified. The leaflets exhibit normal mobility. There is mild regurgitation.   Mitral Valve There is mild regurgitation. There is no evidence of stenosis. The mitral valve has normal structure and normal function.   Tricuspid Valve Tricuspid valve structure is normal. There is mild regurgitation. There is no evidence of stenosis. The right ventricular systolic pressure is normal. The estimated right ventricular systolic pressure is 33.00 mmHg.   Pulmonic Valve The pulmonic valve was not assessed.   Ascending Aorta The aortic root is normal in size. The ascending aorta is mildly dilated at 3.9 cm.   IVC/SVC The right atrial pressure is estimated at 5.0 mmHg. The inferior vena cava is normal in size. Respirophasic changes were normal.     --------------------------------------------------------------------------------  HOLTER  No results found for this or any previous  visit.    --------------------------------------------------------------------------------  CAROTIDS  No results found for this or any previous visit.       ---------------------------------------------------------------------------------  Review of Systems   Constitutional:  Negative for chills and fever.   HENT:  Negative for ear pain and sore throat.    Eyes:  Negative for pain and visual disturbance.   Respiratory:  Negative for cough and shortness of breath.    Cardiovascular:  Negative for chest pain and palpitations.   Gastrointestinal:  Negative for abdominal pain and vomiting.   Genitourinary:  Negative for dysuria and hematuria.   Musculoskeletal:  Negative for arthralgias and back pain.   Skin:  Negative for color change and rash.   Neurological:  Negative for seizures and syncope.   All other systems reviewed and are negative.      Current Medications[1]  Past Medical History:   Diagnosis Date    Hypertension      Past Surgical History:   Procedure Laterality Date    ARTERY SURGERY  2018    CARDIAC CATHETERIZATION N/A 6/28/2022    Procedure: CARDIAC CATHETERIZATION;  Surgeon: Billy Ansari MD;  [facility];  Service: Cardiology    CARDIAC CATHETERIZATION N/A 6/28/2022    Procedure: Cardiac pci;  Surgeon: Billy Ansari MD;  [facility];  Service: Cardiology     Social History     Socioeconomic History    Marital status: Single     Spouse name: Not on file    Number of children: Not on file    Years of education: Not on file    Highest education level: Not on file   Occupational History    Not on file   Tobacco Use    Smoking status: Heavy Smoker     Current packs/day: 0.25     Average packs/day: 0.3 packs/day for 50.2 years (12.5 ttl pk-yrs)     Types: Cigarettes     Start date: 3/11/1975    Smokeless tobacco: Never   Vaping Use    Vaping status: Never Used   Substance and Sexual Activity    Alcohol use: Yes     Comment: ocassionally    Drug use: Never    Sexual activity:  Not Currently   Other Topics Concern    Not on file   Social History Narrative    Not on file     Social Drivers of Health     Financial Resource Strain: Low Risk  (3/17/2025)    Overall Financial Resource Strain (CARDIA)     Difficulty of Paying Living Expenses: Not hard at all   Food Insecurity: No Food Insecurity (3/17/2025)    Nursing - Inadequate Food Risk Classification     Worried About Running Out of Food in the Last Year: Never true     Ran Out of Food in the Last Year: Never true     Ran Out of Food in the Last Year: Not on file   Transportation Needs: No Transportation Needs (3/17/2025)    PRAPARE - Transportation     Lack of Transportation (Medical): No     Lack of Transportation (Non-Medical): No   Physical Activity: Sufficiently Active (3/17/2025)    Exercise Vital Sign     Days of Exercise per Week: 5 days     Minutes of Exercise per Session: 30 min   Stress: No Stress Concern Present (3/17/2025)    [employer] - Occupational Stress Questionnaire     Feeling of Stress : Not at all   Social Connections: Moderately Isolated (3/17/2025)    Social Connection and Isolation Panel     Frequency of Communication with Friends and Family: More than three times a week     Frequency of Social Gatherings with Friends and Family: Never     Attends Yazidi Services: Never     Active Member of Clubs or Organizations: No     Attends Club or Organization Meetings: Never     Marital Status:    Intimate Partner Violence: Not At Risk (3/17/2025)    Humiliation, Afraid, Rape, and Kick questionnaire     Fear of Current or Ex-Partner: No     Emotionally Abused: No     Physically Abused: No     Sexually Abused: No   Housing Stability: Unknown (3/17/2025)    Housing Stability Vital Sign     Unable to Pay for Housing in the Last Year: No     Number of Times Moved in the Last Year: Not on file     Homeless in the Last Year: No     Family History   Problem Relation Age of Onset    Stomach  "cancer Mother     No Known Problems Father      Allergies[2]    Objective     Vitals:    05/15/25 1055   BP: 118/84   BP Location: Left arm   Patient Position: Sitting   Cuff Size: Standard   Pulse: 66   SpO2: 98%   Weight: 73.3 kg (161 lb 11.2 oz)   Height: 5' 10\" (1.778 m)       Physical exam:     Physical Exam  Vitals and nursing note reviewed.   Constitutional:       General: He is not in acute distress.     Appearance: He is well-developed.   HENT:      Head: Normocephalic and atraumatic.     Eyes:      Conjunctiva/sclera: Conjunctivae normal.       Cardiovascular:      Rate and Rhythm: Normal rate and regular rhythm.      Heart sounds: No murmur heard.  Pulmonary:      Effort: Pulmonary effort is normal. No respiratory distress.      Breath sounds: Normal breath sounds.   Abdominal:      Palpations: Abdomen is soft.      Tenderness: There is no abdominal tenderness.     Musculoskeletal:         General: No swelling.      Cervical back: Neck supple.     Skin:     General: Skin is warm and dry.      Capillary Refill: Capillary refill takes less than 2 seconds.     Neurological:      Mental Status: He is alert.     Psychiatric:         Mood and Affect: Mood normal.           ==  PLEASE NOTE:  This encounter was completed utilizing the [email or web address] Direct Speech Voice Recognition Software. Grammatical errors, random word insertions, pronoun errors and incomplete sentences are occasional consequences of the system due to software limitations, ambient noise and hardware issues.These may be missed by proof reading prior to affixing electronic signature. Any questions or concerns about the content, text or information contained within the body of this dictation should be directly addressed to the physician for clarification. Please do not hesitate to call me directly if you have any any questions or concerns.       [1]   Current Outpatient Medications:     amLODIPine (NORVASC) 10 mg tablet, Take 1 tablet (10 mg " total) by mouth daily, Disp: 90 tablet, Rfl: 0    atorvastatin (LIPITOR) 40 mg tablet, TAKE 1 TABLET BY MOUTH DAILY WITH DINNER, Disp: 90 tablet, Rfl: 1    carvedilol (COREG) 12.5 mg tablet, TAKE 1 TABLET BY MOUTH TWICE A DAY WITH FOOD, Disp: 180 tablet, Rfl: 1    furosemide (LASIX) 20 mg tablet, Take 1 tablet (20 mg total) by mouth daily as needed (weight gain more than 3 pounds), Disp: 90 tablet, Rfl: 1    nitroglycerin (NITROSTAT) 0.4 mg SL tablet, Place 1 tablet (0.4 mg total) under the tongue every 5 (five) minutes as needed for chest pain, Disp: 24 tablet, Rfl: 1    spironolactone (ALDACTONE) 25 mg tablet, Take 1 tablet (25 mg total) by mouth daily, Disp: 90 tablet, Rfl: 1    ticagrelor (BRILINTA) 90 MG, Take 1 tablet (90 mg total) by mouth every 12 (twelve) hours, Disp: 180 tablet, Rfl: 1    [facility] Aspirin Adult Low Dose 81 MG chewable tablet, CHEW 1 TABLET BY MOUTH DAILY (Patient not taking: Reported on 2/27/2024), Disp: 90 tablet, Rfl: 1  [2] No Known Allergies

## 2025-06-14 DIAGNOSIS — R79.89 ELEVATED TROPONIN LEVEL NOT DUE MYOCARDIAL INFARCTION: ICD-10-CM

## 2025-06-14 DIAGNOSIS — U07.1 CARDIOMYOPATHY DUE TO COVID-19 VIRUS (HCC): ICD-10-CM

## 2025-06-14 DIAGNOSIS — I43 CARDIOMYOPATHY DUE TO COVID-19 VIRUS (HCC): ICD-10-CM

## 2025-06-14 RX ORDER — CARVEDILOL 12.5 MG/1
12.5 TABLET ORAL 2 TIMES DAILY WITH MEALS
Qty: 180 TABLET | Refills: 1 | Status: SHIPPED | OUTPATIENT
Start: 2025-06-14

## 2025-06-19 DIAGNOSIS — I10 PRIMARY HYPERTENSION: ICD-10-CM

## 2025-06-19 NOTE — TELEPHONE ENCOUNTER
Reason for call:   [x] Refill   [] Prior Auth  [] Other:     Office:   [x] PCP/Provider -   [] Specialty/Provider -     Medication:     amLODIPine (NORVASC) 10 mg tablet       Dose/Frequency:  Take 1 tablet (10 mg total) by mouth daily,     Quantity: 90 tablet    Pharmacy: [facility]/pharm[facility] #[id number] - [place], [place] -      Local Pharmacy   Does the patient have enough for 3 days?   [x] Yes   [] No - Send as HP to POD    Mail Away Pharmacy   Does the patient have enough for 10 days?   [] Yes   [] No - Send as HP to POD

## 2025-06-20 RX ORDER — AMLODIPINE BESYLATE 10 MG/1
10 TABLET ORAL DAILY
Qty: 90 TABLET | Refills: 0 | Status: SHIPPED | OUTPATIENT
Start: 2025-06-20

## 2025-07-17 ENCOUNTER — OFFICE VISIT (OUTPATIENT)
Age: 75
End: 2025-07-17
Payer: COMMERCIAL

## 2025-07-17 VITALS
HEART RATE: 75 BPM | HEIGHT: 70 IN | TEMPERATURE: 98 F | SYSTOLIC BLOOD PRESSURE: 172 MMHG | OXYGEN SATURATION: 97 % | BODY MASS INDEX: 23.45 KG/M2 | RESPIRATION RATE: 18 BRPM | WEIGHT: 163.8 LBS | DIASTOLIC BLOOD PRESSURE: 79 MMHG

## 2025-07-17 DIAGNOSIS — I25.119 ATHEROSCLEROSIS OF NATIVE CORONARY ARTERY OF NATIVE HEART WITH ANGINA PECTORIS (HCC): ICD-10-CM

## 2025-07-17 DIAGNOSIS — I73.9 PAD (PERIPHERAL ARTERY DISEASE) (HCC): ICD-10-CM

## 2025-07-17 DIAGNOSIS — I10 PRIMARY HYPERTENSION: Primary | ICD-10-CM

## 2025-07-17 DIAGNOSIS — E78.2 MIXED HYPERLIPIDEMIA: ICD-10-CM

## 2025-07-17 DIAGNOSIS — J43.2 CENTRILOBULAR EMPHYSEMA (HCC): ICD-10-CM

## 2025-07-17 DIAGNOSIS — Z72.0 TOBACCO ABUSE: ICD-10-CM

## 2025-07-17 PROCEDURE — 99214 OFFICE O/P EST MOD 30 MIN: CPT | Performed by: INTERNAL MEDICINE

## 2025-07-17 PROCEDURE — G2211 COMPLEX E/M VISIT ADD ON: HCPCS | Performed by: INTERNAL MEDICINE

## 2025-07-17 RX ORDER — NIFEDIPINE 30 MG/1
30 TABLET, EXTENDED RELEASE ORAL DAILY
Qty: 90 TABLET | Refills: 0 | Status: SHIPPED | OUTPATIENT
Start: 2025-07-17 | End: 2026-01-13

## 2025-07-17 NOTE — ASSESSMENT & PLAN NOTE
No current exacerbation, continues to smoke.  Encourage smoking cessation patient not willing to quit

## 2025-07-17 NOTE — PROGRESS NOTES
Name: Josh Olsen      : 1950      MRN: [id number]  Encounter Provider: Mitchell Matt MD  Encounter Date: 2025   Encounter department: [facility] PRIMARY CARE    Assessment & Plan  Primary hypertension  Repeat /90.  Patient is unhappy about his amlodipine prescription, reports that pharmacy gave him either from a different manufacturers.  He agreed to stop amlodipine, will start all Procardia XL and uptitrate as needed.  Patient encouraged to monitor blood pressures at home and call me in 2 weeks for persistent elevation of her greater than 140/90.  Encouraged DASH diet.  Continue rest of his medications  Orders:    NIFEdipine (PROCARDIA XL) 30 mg 24 hr tablet; Take 1 tablet (30 mg total) by mouth daily    Atherosclerosis of native coronary artery of native heart with angina pectoris (HCC)  Stable, continue current medications.  Has recently established with cardiology       Centrilobular emphysema (HCC)  No current exacerbation, continues to smoke.  Encourage smoking cessation patient not willing to quit       Tobacco abuse  Encouraged smoking cessation, patient not willing to quit       Mixed hyperlipidemia  Needs to have updated lipid panel.  Orders given, continue current medication       PAD (peripheral artery disease) (HCC)  Stable, continue statin and aspirin.            History of Present Illness     Josh Olsen, a 75-year-old patient, had a cardiology follow-up in May 2025, where his chronic diastolic heart failure and hypertension were stable, and he reported no concerning symptoms [20]. His medication regimen included aspirin, atorvastatin, and carvedilol, with Brilinta discontinued due to being three years post-ALLEN    Patient comes for follow-up of chronic medical conditions, review of recent labs and imaging wherever applicable.  Denies any chest pain, shortness of breath, nausea or vomiting.         Patient comes for follow-up of chronic medical conditions, review of  "recent labs and imaging wherever applicable.  Denies any chest pain, shortness of breath, nausea or vomiting.       Review of Systems   Constitutional:  Negative for activity change, appetite change, chills, diaphoresis, fatigue, fever and unexpected weight change.   HENT:  Negative for congestion and sore throat.    Respiratory:  Negative for apnea, cough, choking, chest tightness, shortness of breath, wheezing and stridor.    Cardiovascular:  Negative for chest pain, palpitations and leg swelling.   Gastrointestinal:  Negative for abdominal distention, abdominal pain, blood in stool, constipation, nausea and rectal pain.   Genitourinary:  Negative for dysuria, flank pain, frequency and urgency.   Musculoskeletal:  Negative for arthralgias, back pain, gait problem, joint swelling and myalgias.   Skin:  Negative for color change, pallor and rash.   Neurological:  Negative for headaches.     Past Medical History[1]  Past Surgical History[2]  Family History[3]  Social History[4]  Medications[5]  No Known Allergies    There is no immunization history on file for this patient.  Objective   BP (!) 172/79 (BP Location: Right arm, Patient Position: Sitting, Cuff Size: Standard)   Pulse 75   Temp 98 °F (36.7 °C) (Temporal)   Resp 18   Ht 5' 10\" (1.778 m)   Wt 74.3 kg (163 lb 12.8 oz)   SpO2 97%   BMI 23.50 kg/m²     Physical Exam  Constitutional:       General: He is not in acute distress.     Appearance: Normal appearance. He is normal weight. He is not ill-appearing, toxic-appearing or diaphoretic.     Cardiovascular:      Rate and Rhythm: Normal rate and regular rhythm.      Pulses: Normal pulses.      Heart sounds: Normal heart sounds. No murmur heard.     No gallop.   Pulmonary:      Effort: Pulmonary effort is normal. No respiratory distress.      Breath sounds: Normal breath sounds. No stridor. No wheezing, rhonchi or rales.   Chest:      Chest wall: No tenderness.     Musculoskeletal:      Right lower leg: No " edema.      Left lower leg: No edema.     Neurological:      Mental Status: He is alert and oriented to person, place, and time.            [1]   Past Medical History:  Diagnosis Date    Hypertension    [2]   Past Surgical History:  Procedure Laterality Date    ARTERY SURGERY  2018    CARDIAC CATHETERIZATION N/A 6/28/2022    Procedure: CARDIAC CATHETERIZATION;  Surgeon: Billy Ansari MD;  [facility];  Service: Cardiology    CARDIAC CATHETERIZATION N/A 6/28/2022    Procedure: Cardiac pci;  Surgeon: Billy Ansari MD;  [facility];  Service: Cardiology   [3]   Family History  Problem Relation Name Age of Onset    Stomach cancer Mother      No Known Problems Father     [4]   Social History  Tobacco Use    Smoking status: Heavy Smoker     Current packs/day: 0.25     Average packs/day: 0.3 packs/day for 50.4 years (12.6 ttl pk-yrs)     Types: Cigarettes     Start date: 3/11/1975    Smokeless tobacco: Never   Vaping Use    Vaping status: Never Used   Substance and Sexual Activity    Alcohol use: Yes     Comment: ocassionally    Drug use: Never    Sexual activity: Not Currently   [5]   Current Outpatient Medications on File Prior to Visit   Medication Sig    amLODIPine (NORVASC) 10 mg tablet Take 1 tablet (10 mg total) by mouth daily    aspirin ([facility] Aspirin Adult Low Dose) 81 mg chewable tablet Chew 1 tablet (81 mg total) daily    atorvastatin (LIPITOR) 40 mg tablet TAKE 1 TABLET BY MOUTH DAILY WITH DINNER    carvedilol (COREG) 12.5 mg tablet TAKE 1 TABLET BY MOUTH TWICE A DAY WITH FOOD    furosemide (LASIX) 20 mg tablet Take 1 tablet (20 mg total) by mouth daily as needed (weight gain more than 3 pounds)    nitroglycerin (NITROSTAT) 0.4 mg SL tablet Place 1 tablet (0.4 mg total) under the tongue every 5 (five) minutes as needed for chest pain (Patient not taking: Reported on 7/17/2025)

## 2025-07-17 NOTE — ASSESSMENT & PLAN NOTE
Repeat /90.  Patient is unhappy about his amlodipine prescription, reports that pharmacy gave him either from a different manufacturers.  He agreed to stop amlodipine, will start all Procardia XL and uptitrate as needed.  Patient encouraged to monitor blood pressures at home and call me in 2 weeks for persistent elevation of her greater than 140/90.  Encouraged DASH diet.  Continue rest of his medications  Orders:    NIFEdipine (PROCARDIA XL) 30 mg 24 hr tablet; Take 1 tablet (30 mg total) by mouth daily

## (undated) DEVICE — TREK CORONARY DILATATION CATHETER 2.50 MM X 15 MM / RAPID-EXCHANGE: Brand: TREK

## (undated) DEVICE — RUNTHROUGH NS EXTRA FLOPPY PTCA GUIDEWIRE: Brand: RUNTHROUGH

## (undated) DEVICE — GLIDESHEATH SLENDER STAINLESS STEEL KIT: Brand: GLIDESHEATH SLENDER

## (undated) DEVICE — NC TREK CORONARY DILATATION CATHETER 3.0 MM X 15 MM / RAPID-EXCHANGE: Brand: NC TREK

## (undated) DEVICE — GUIDEWIRE FFR VERRATA PLUS 185CM STR

## (undated) DEVICE — GUIDEWIRE WHOLEY HI TORQUE INTERM MOD J .035 145CM

## (undated) DEVICE — DGW .035 FC J3MM 260CM TEF: Brand: EMERALD

## (undated) DEVICE — TR BAND RADIAL ARTERY COMPRESSION DEVICE: Brand: TR BAND

## (undated) DEVICE — RADIFOCUS OPTITORQUE ANGIOGRAPHIC CATHETER: Brand: OPTITORQUE

## (undated) DEVICE — CATH GUIDE LAUNCHER 6FR EBU 3.5

## (undated) DEVICE — CATH GUIDE LAUNCHER 6FR AL 1.0

## (undated) DEVICE — CATH IVUS EAGLE EYE PLATINUM 5FR 150CM